# Patient Record
Sex: FEMALE | Race: WHITE | NOT HISPANIC OR LATINO | URBAN - METROPOLITAN AREA
[De-identification: names, ages, dates, MRNs, and addresses within clinical notes are randomized per-mention and may not be internally consistent; named-entity substitution may affect disease eponyms.]

---

## 2019-06-17 ENCOUNTER — EMERGENCY (EMERGENCY)
Facility: HOSPITAL | Age: 69
LOS: 1 days | Discharge: HOME | End: 2019-06-17
Admitting: EMERGENCY MEDICINE
Payer: MEDICARE

## 2019-06-17 VITALS
TEMPERATURE: 98 F | RESPIRATION RATE: 18 BRPM | DIASTOLIC BLOOD PRESSURE: 81 MMHG | OXYGEN SATURATION: 100 % | SYSTOLIC BLOOD PRESSURE: 152 MMHG | HEART RATE: 74 BPM

## 2019-06-17 LAB
APPEARANCE UR: CLEAR — SIGNIFICANT CHANGE UP
BACTERIA # UR AUTO: HIGH
BILIRUB UR-MCNC: NEGATIVE — SIGNIFICANT CHANGE UP
BLOOD UR QL VISUAL: SIGNIFICANT CHANGE UP
COLOR SPEC: YELLOW — SIGNIFICANT CHANGE UP
GLUCOSE UR-MCNC: NEGATIVE — SIGNIFICANT CHANGE UP
HYALINE CASTS # UR AUTO: HIGH
KETONES UR-MCNC: NEGATIVE — SIGNIFICANT CHANGE UP
LEUKOCYTE ESTERASE UR-ACNC: SIGNIFICANT CHANGE UP
NITRITE UR-MCNC: POSITIVE — HIGH
PH UR: 6 — SIGNIFICANT CHANGE UP (ref 5–8)
PROT UR-MCNC: 20 — SIGNIFICANT CHANGE UP
RBC CASTS # UR COMP ASSIST: HIGH (ref 0–?)
SP GR SPEC: 1.02 — SIGNIFICANT CHANGE UP (ref 1–1.04)
SQUAMOUS # UR AUTO: SIGNIFICANT CHANGE UP
UROBILINOGEN FLD QL: NORMAL — SIGNIFICANT CHANGE UP
WBC UR QL: >50 — HIGH (ref 0–?)

## 2019-06-17 PROCEDURE — 99283 EMERGENCY DEPT VISIT LOW MDM: CPT | Mod: 25

## 2019-06-17 RX ORDER — PHENAZOPYRIDINE HCL 100 MG
1 TABLET ORAL
Qty: 4 | Refills: 0
Start: 2019-06-17 | End: 2020-05-19

## 2019-06-17 RX ORDER — FLUCONAZOLE 150 MG/1
1 TABLET ORAL
Qty: 1 | Refills: 0
Start: 2019-06-17 | End: 2019-06-17

## 2019-06-17 RX ORDER — NITROFURANTOIN MACROCRYSTAL 50 MG
1 CAPSULE ORAL
Qty: 20 | Refills: 0
Start: 2019-06-17 | End: 2019-06-26

## 2019-06-17 RX ORDER — PHENAZOPYRIDINE HCL 100 MG
1 TABLET ORAL
Qty: 4 | Refills: 0
Start: 2019-06-17 | End: 2019-06-18

## 2019-06-17 RX ORDER — CIPROFLOXACIN LACTATE 400MG/40ML
500 VIAL (ML) INTRAVENOUS ONCE
Refills: 0 | Status: COMPLETED | OUTPATIENT
Start: 2019-06-17 | End: 2019-06-17

## 2019-06-17 RX ADMIN — Medication 500 MILLIGRAM(S): at 06:39

## 2019-06-17 NOTE — ED ADULT NURSE NOTE - OBJECTIVE STATEMENT
patient aaox4. came in with burning and itching while urinating and increase pressure when palpating lower abdomen. has a hx of uti and cancer. currently in in nad. sent urine tests. urine is clear and yellow. Will continue to monitor

## 2019-06-17 NOTE — ED PROVIDER NOTE - OBJECTIVE STATEMENT
70 y/o female pmh colon CA, had colostomy reversed 1 month ago presents with c/o dysuria, polyuria x 2 days, denies any  headaches, neck pain, cough,f /c/n/v/d, chest pain, sob, abdominal pain, hematuria, numbness/weakness/tingling, recent travel, sick contact

## 2019-06-17 NOTE — ED ADULT NURSE NOTE - NSIMPLEMENTINTERV_GEN_ALL_ED
Implemented All Universal Safety Interventions:  [place] to call system. Call bell, personal items and telephone within reach. Instruct patient to call for assistance. Room bathroom lighting operational. Non-slip footwear when patient is off stretcher. Physically safe environment: no spills, clutter or unnecessary equipment. Stretcher in lowest position, wheels locked, appropriate side rails in place.

## 2019-06-17 NOTE — ED PROVIDER NOTE - CARE PLAN
Principal Discharge DX:	UTI (urinary tract infection)  Assessment and plan of treatment:	pls rest, drink plenty of fluids, take your abx as instructed, f/u with your pmd, return for any worsening symptoms or any other concerning symptoms

## 2019-06-17 NOTE — ED PROVIDER NOTE - PLAN OF CARE
pls rest, drink plenty of fluids, take your abx as instructed, f/u with your pmd, return for any worsening symptoms or any other concerning symptoms

## 2019-06-18 LAB — SPECIMEN SOURCE: SIGNIFICANT CHANGE UP

## 2019-06-19 LAB
-  AMIKACIN: SIGNIFICANT CHANGE UP
-  AMPICILLIN/SULBACTAM: SIGNIFICANT CHANGE UP
-  AMPICILLIN: SIGNIFICANT CHANGE UP
-  AZTREONAM: SIGNIFICANT CHANGE UP
-  CEFAZOLIN: SIGNIFICANT CHANGE UP
-  CEFEPIME: SIGNIFICANT CHANGE UP
-  CEFOXITIN: SIGNIFICANT CHANGE UP
-  CEFTAZIDIME: SIGNIFICANT CHANGE UP
-  CEFTRIAXONE: SIGNIFICANT CHANGE UP
-  CIPROFLOXACIN: SIGNIFICANT CHANGE UP
-  ERTAPENEM: SIGNIFICANT CHANGE UP
-  GENTAMICIN: SIGNIFICANT CHANGE UP
-  IMIPENEM: SIGNIFICANT CHANGE UP
-  LEVOFLOXACIN: SIGNIFICANT CHANGE UP
-  MEROPENEM: SIGNIFICANT CHANGE UP
-  NITROFURANTOIN: SIGNIFICANT CHANGE UP
-  PIPERACILLIN/TAZOBACTAM: SIGNIFICANT CHANGE UP
-  TIGECYCLINE: SIGNIFICANT CHANGE UP
-  TOBRAMYCIN: SIGNIFICANT CHANGE UP
-  TRIMETHOPRIM/SULFAMETHOXAZOLE: SIGNIFICANT CHANGE UP
BACTERIA UR CULT: SIGNIFICANT CHANGE UP
METHOD TYPE: SIGNIFICANT CHANGE UP
ORGANISM # SPEC MICROSCOPIC CNT: SIGNIFICANT CHANGE UP
ORGANISM # SPEC MICROSCOPIC CNT: SIGNIFICANT CHANGE UP

## 2019-06-19 NOTE — ED POST DISCHARGE NOTE - REASON FOR FOLLOW-UP
Other Mine from [facility] called asking that we fax over UCX results to  fax confirmation received.

## 2019-08-30 ENCOUNTER — EMERGENCY (EMERGENCY)
Facility: HOSPITAL | Age: 69
LOS: 1 days | Discharge: HOME | End: 2019-08-30
Attending: EMERGENCY MEDICINE | Admitting: EMERGENCY MEDICINE
Payer: MEDICARE

## 2019-08-30 VITALS
OXYGEN SATURATION: 100 % | RESPIRATION RATE: 18 BRPM | DIASTOLIC BLOOD PRESSURE: 62 MMHG | HEART RATE: 79 BPM | TEMPERATURE: 98 F | SYSTOLIC BLOOD PRESSURE: 129 MMHG

## 2019-08-30 LAB
ALBUMIN SERPL ELPH-MCNC: 4.1 G/DL — SIGNIFICANT CHANGE UP (ref 3.3–5)
ALP SERPL-CCNC: 114 U/L — SIGNIFICANT CHANGE UP (ref 40–120)
ALT FLD-CCNC: 27 U/L — SIGNIFICANT CHANGE UP (ref 4–33)
ANION GAP SERPL CALC-SCNC: 11 MMO/L — SIGNIFICANT CHANGE UP (ref 7–14)
AST SERPL-CCNC: 24 U/L — SIGNIFICANT CHANGE UP (ref 4–32)
BASOPHILS # BLD AUTO: 0.03 K/UL — SIGNIFICANT CHANGE UP (ref 0–0.2)
BASOPHILS NFR BLD AUTO: 0.3 % — SIGNIFICANT CHANGE UP (ref 0–2)
BILIRUB SERPL-MCNC: 0.4 MG/DL — SIGNIFICANT CHANGE UP (ref 0.2–1.2)
BUN SERPL-MCNC: 13 MG/DL — SIGNIFICANT CHANGE UP (ref 7–23)
CALCIUM SERPL-MCNC: 9.4 MG/DL — SIGNIFICANT CHANGE UP (ref 8.4–10.5)
CHLORIDE SERPL-SCNC: 105 MMOL/L — SIGNIFICANT CHANGE UP (ref 98–107)
CO2 SERPL-SCNC: 25 MMOL/L — SIGNIFICANT CHANGE UP (ref 22–31)
CREAT SERPL-MCNC: 0.65 MG/DL — SIGNIFICANT CHANGE UP (ref 0.5–1.3)
EOSINOPHIL # BLD AUTO: 0.01 K/UL — SIGNIFICANT CHANGE UP (ref 0–0.5)
EOSINOPHIL NFR BLD AUTO: 0.1 % — SIGNIFICANT CHANGE UP (ref 0–6)
GLUCOSE SERPL-MCNC: 109 MG/DL — HIGH (ref 70–99)
HCT VFR BLD CALC: 38.8 % — SIGNIFICANT CHANGE UP (ref 34.5–45)
HGB BLD-MCNC: 12.2 G/DL — SIGNIFICANT CHANGE UP (ref 11.5–15.5)
IMM GRANULOCYTES NFR BLD AUTO: 0.6 % — SIGNIFICANT CHANGE UP (ref 0–1.5)
LIDOCAIN IGE QN: 49.2 U/L — SIGNIFICANT CHANGE UP (ref 7–60)
LYMPHOCYTES # BLD AUTO: 0.8 K/UL — LOW (ref 1–3.3)
LYMPHOCYTES # BLD AUTO: 6.7 % — LOW (ref 13–44)
MCHC RBC-ENTMCNC: 28.3 PG — SIGNIFICANT CHANGE UP (ref 27–34)
MCHC RBC-ENTMCNC: 31.4 % — LOW (ref 32–36)
MCV RBC AUTO: 90 FL — SIGNIFICANT CHANGE UP (ref 80–100)
MONOCYTES # BLD AUTO: 0.47 K/UL — SIGNIFICANT CHANGE UP (ref 0–0.9)
MONOCYTES NFR BLD AUTO: 3.9 % — SIGNIFICANT CHANGE UP (ref 2–14)
NEUTROPHILS # BLD AUTO: 10.57 K/UL — HIGH (ref 1.8–7.4)
NEUTROPHILS NFR BLD AUTO: 88.4 % — HIGH (ref 43–77)
NRBC # FLD: 0 K/UL — SIGNIFICANT CHANGE UP (ref 0–0)
PLATELET # BLD AUTO: 221 K/UL — SIGNIFICANT CHANGE UP (ref 150–400)
PMV BLD: 9.4 FL — SIGNIFICANT CHANGE UP (ref 7–13)
POTASSIUM SERPL-MCNC: 3.8 MMOL/L — SIGNIFICANT CHANGE UP (ref 3.5–5.3)
POTASSIUM SERPL-SCNC: 3.8 MMOL/L — SIGNIFICANT CHANGE UP (ref 3.5–5.3)
PROT SERPL-MCNC: 7.6 G/DL — SIGNIFICANT CHANGE UP (ref 6–8.3)
RBC # BLD: 4.31 M/UL — SIGNIFICANT CHANGE UP (ref 3.8–5.2)
RBC # FLD: 13.8 % — SIGNIFICANT CHANGE UP (ref 10.3–14.5)
SODIUM SERPL-SCNC: 141 MMOL/L — SIGNIFICANT CHANGE UP (ref 135–145)
TROPONIN T, HIGH SENSITIVITY: < 6 NG/L — SIGNIFICANT CHANGE UP (ref ?–14)
TROPONIN T, HIGH SENSITIVITY: < 6 NG/L — SIGNIFICANT CHANGE UP (ref ?–14)
WBC # BLD: 11.95 K/UL — HIGH (ref 3.8–10.5)
WBC # FLD AUTO: 11.95 K/UL — HIGH (ref 3.8–10.5)

## 2019-08-30 PROCEDURE — 93010 ELECTROCARDIOGRAM REPORT: CPT

## 2019-08-30 PROCEDURE — 99284 EMERGENCY DEPT VISIT MOD MDM: CPT | Mod: 25

## 2019-08-30 RX ORDER — MECLIZINE HCL 12.5 MG
1 TABLET ORAL
Qty: 15 | Refills: 0
Start: 2019-08-30

## 2019-08-30 RX ORDER — MECLIZINE HCL 12.5 MG
25 TABLET ORAL ONCE
Refills: 0 | Status: COMPLETED | OUTPATIENT
Start: 2019-08-30 | End: 2019-08-30

## 2019-08-30 RX ORDER — SODIUM CHLORIDE 9 MG/ML
500 INJECTION INTRAMUSCULAR; INTRAVENOUS; SUBCUTANEOUS ONCE
Refills: 0 | Status: COMPLETED | OUTPATIENT
Start: 2019-08-30 | End: 2019-08-30

## 2019-08-30 RX ORDER — FAMOTIDINE 10 MG/ML
20 INJECTION INTRAVENOUS DAILY
Refills: 0 | Status: DISCONTINUED | OUTPATIENT
Start: 2019-08-30 | End: 2019-09-03

## 2019-08-30 RX ORDER — HEPARIN SODIUM 5000 [USP'U]/ML
30 INJECTION INTRAVENOUS; SUBCUTANEOUS ONCE
Refills: 0 | Status: COMPLETED | OUTPATIENT
Start: 2019-08-30 | End: 2019-08-30

## 2019-08-30 RX ADMIN — FAMOTIDINE 20 MILLIGRAM(S): 10 INJECTION INTRAVENOUS at 14:48

## 2019-08-30 RX ADMIN — SODIUM CHLORIDE 500 MILLILITER(S): 9 INJECTION INTRAMUSCULAR; INTRAVENOUS; SUBCUTANEOUS at 14:47

## 2019-08-30 RX ADMIN — Medication 25 MILLIGRAM(S): at 14:48

## 2019-08-30 RX ADMIN — HEPARIN SODIUM 30 UNIT(S): 5000 INJECTION INTRAVENOUS; SUBCUTANEOUS at 17:07

## 2019-08-30 NOTE — ED ADULT NURSE NOTE - OBJECTIVE STATEMENT
69 y female A+OX3 presents to the ER with the complaint of dizziness. Pt states this morning after coming out of the shower, she started experiencing dizziness with nausea and diaphoresis. Pt states she also vomited twice today prior to arrival.  Pt states dizziness gets worse when she moves or turns her head but when she is staying still. symptoms disappear. Pt hx of colon ca but currently not on any chemo or radiation. Denies any other PMH or daily meds. Pt has a RCW port. Pt being evaluated by MD team. Will continue to monitor.

## 2019-08-30 NOTE — ED ADULT NURSE REASSESSMENT NOTE - NS ED NURSE REASSESS COMMENT FT1
at 1440, pts right chest wall mediport was accessed with a 22g 1/2 inch allison needle. port flushes well, with positive blood return. pt currently provided with crackers to be PO challenged.  pt reports improvement in symptoms

## 2019-08-30 NOTE — ED PROVIDER NOTE - CHPI ED SYMPTOMS NEG
no fever/no blurred vision/no change in level of consciousness/Denies blurred vision, SOB, abd pain, vomiting, weakness, numbness, f/c, or change in LOC./no numbness/no weakness/no vomiting

## 2019-08-30 NOTE — ED PROVIDER NOTE - ATTENDING CONTRIBUTION TO CARE
Pt was seen and evaluated by me. Pt noted having dizziness today, worse with movement, noted after getting of the shower. Pt also notes having a blintz she is not sure agreed with her. Pt notes after an episode of vomiting feeling better. Denies any headache, fever, chills, nausea, vomiting, SOB, chest pain, or abd pain. Lungs CTA b/l. RRR. Abd soft, non-tender. No nystagmus. No focal deficits. TMI b/l. Signed out to receiving physician Dr. Edward who will further evaluate. Pt was seen and evaluated by me. Pt noted having dizziness today, worse with movement, noted after getting of the shower. Pt also notes having a blintz she is not sure agreed with her. Pt notes after an episode of vomiting feeling better. Denies any headache, fever, chills, nausea, vomiting, SOB, chest pain, or abd pain. Lungs CTA b/l. RRR. Abd soft, non-tender. No nystagmus. No focal deficits. TMI b/l.

## 2019-08-30 NOTE — ED PROVIDER NOTE - PATIENT PORTAL LINK FT
You can access the FollowMyHealth Patient Portal offered by [facility] by registering at the following website: [email or web address]. By joining [employer]’s FollowMyHealth portal, you will also be able to view your health information using other applications (apps) compatible with our system.

## 2019-08-30 NOTE — ED PROVIDER NOTE - OBJECTIVE STATEMENT
70 y/o F with a PMHx of colorectal cancer presents to the ED c/o dizziness at 11:30AM today. Pt states she was taking a shower when she suddenly started feeling dizzy and nausea. Pt denotes similar symptoms a few years ago secondary to food ingestion and vomiting. Pt describe dizziness when she leaning back, but improves when sitting still. She is concern for vertigo. Pt states dizziness persist when she moves her head and closes her eyes. Of note, pt has an external port secondary from her cancer related tx. Denies blurred vision, SOB, abd pain, vomiting, weakness, numbness, f/c, or change in LOC. No other acute complaints at time of eval. 70 y/o F with a PMHx of colorectal cancer presents to the ED c/o dizziness with associated symptoms of nausea and diaphoresis after eating a pastry at 11:30AM today. Pt states she was taking a shower when she suddenly started feeling dizzy and nausea. Pt denotes similar symptoms a few years ago secondary to food ingestion and vomiting. Pt describe dizziness when she leaning back, but improves when sitting still. She is concern for vertigo. Pt states dizziness persist when she moves her head and closes her eyes. Of note, pt has an external port secondary from her cancer related tx. Denies blurred vision, SOB, abd pain, vomiting, weakness, numbness, f/c, or change in LOC. No other acute complaints at time of eval.

## 2019-08-30 NOTE — ED PROVIDER NOTE - PROGRESS NOTE DETAILS
Sudhir-PGY1: pt seen and evaluated by myself.  69 year old female with PMH colon cancer s/p partial collectomy and not currently undergoing chemotherapy presents with dizziness since this morning. Pt states she was getting out of the shower at approximately 1130 this morning and c/o sudden onset dizziness described as "room spinning."  Pt also reports sweating, nausea, and an episode of vomiting.  Pt states she had a similar episode "a few years ago" that resolved without intervention.  Pt states the dizziness is worse with head movement and improved when laying still. Pt reports recent postnasal drip, but denies nasal congestion, ear pain, hearing loss, or cough. Denies headache, chest pain, shortness of breath, abdominal pain, fever, rash, weakness, numbness, or paresthesias.  Denies any recent injury or trauma. On exam, patient has CN II-XII intact with 5/5 strength in upper and lower extremities, no focal neurologic deficits.  No nystagmus.  Positive Romberg test, patient is ambulatory without assistance.  Likely BPPV as dizziness is worse with head movement and patient has recent postnasal drip. Plan includes EKG, labs, symptomatic treatment and reassessment with disposition accordingly. Sudhir-PGY1: pt seen and re-evaluated at bedside.  Pt states her symptoms have improved.  Pt comfortable in NAD.  Will send repeat troponin since symptoms started within 3 hours of initial negative troponin.  Will PO challenge pt and reassess with likely DC. Sudhir-PGY1: pt tolerating PO intake without any nausea, vomiting, or pain.  Pt reports improved symptoms, requesting DC.

## 2019-08-30 NOTE — ED ADULT TRIAGE NOTE - CHIEF COMPLAINT QUOTE
colon CA one year ago no longer on chemo. reports onset of 1130 am of dizziness and nausea which is worse with position. reports symptoms improve when she sits still and does not move her head. Jammie dozier

## 2019-08-30 NOTE — ED PROVIDER NOTE - CLINICAL SUMMARY MEDICAL DECISION MAKING FREE TEXT BOX
70 y/o F with a PMHx of colorectal cancer presents to the ED c/o dizziness at 11:30AM today. Plan - Will obtain blood work, give fluids, 70 y/o F with a PMHx of colorectal cancer presents to the ED c/o dizziness with associated symptoms of nausea and diaphoresis after eating a pastry at 11:30AM today. Concern for ACS vs vertigo vs sinusitis. Plan - Will obtain blood work and EKG, give fluids, meclizine.

## 2019-08-30 NOTE — ED PROVIDER NOTE - NSFOLLOWUPINSTRUCTIONS_ED_ALL_ED_FT
Dizziness    Dizziness can manifest as a feeling of unsteadiness or light-headedness. You may feel like you are about to faint. This condition can be caused by a number of things, including medicines, dehydration, or illness. Drink enough fluid to keep your urine clear or pale yellow. Do not drink alcohol and limit your caffeine intake. Avoid quick or sudden movements.  Rise slowly from chairs and steady yourself until you feel okay. In the morning, first sit up on the side of the bed.    Rest and drink plenty of fluids.    Take meclizine 25 mg 1 tablet up to 3 times a day as needed for dizziness.     SEEK IMMEDIATE MEDICAL CARE IF YOU HAVE ANY OF THE FOLLOWING SYMPTOMS: vomiting, changes in your vision or speech, weakness in your arms or legs, trouble speaking or swallowing, chest pain, abdominal pain, shortness of breath, sweating, bleeding, headache, neck pain, or fever.

## 2020-05-18 ENCOUNTER — EMERGENCY (EMERGENCY)
Facility: HOSPITAL | Age: 70
LOS: 1 days | Discharge: HOME | End: 2020-05-18
Attending: EMERGENCY MEDICINE | Admitting: EMERGENCY MEDICINE
Payer: MEDICARE

## 2020-05-18 VITALS
SYSTOLIC BLOOD PRESSURE: 155 MMHG | RESPIRATION RATE: 18 BRPM | DIASTOLIC BLOOD PRESSURE: 84 MMHG | TEMPERATURE: 98 F | OXYGEN SATURATION: 100 % | HEART RATE: 91 BPM

## 2020-05-18 PROBLEM — C19 MALIGNANT NEOPLASM OF RECTOSIGMOID JUNCTION: Chronic | Status: ACTIVE | Noted: 2019-08-30

## 2020-05-18 LAB
ANION GAP SERPL CALC-SCNC: 12 MMO/L — SIGNIFICANT CHANGE UP (ref 7–14)
APPEARANCE UR: CLEAR — SIGNIFICANT CHANGE UP
BASOPHILS # BLD AUTO: 0.02 K/UL — SIGNIFICANT CHANGE UP (ref 0–0.2)
BASOPHILS NFR BLD AUTO: 0.2 % — SIGNIFICANT CHANGE UP (ref 0–2)
BILIRUB UR-MCNC: NEGATIVE — SIGNIFICANT CHANGE UP
BLOOD UR QL VISUAL: NEGATIVE — SIGNIFICANT CHANGE UP
BUN SERPL-MCNC: 12 MG/DL — SIGNIFICANT CHANGE UP (ref 7–23)
CALCIUM SERPL-MCNC: 9.9 MG/DL — SIGNIFICANT CHANGE UP (ref 8.4–10.5)
CHLORIDE SERPL-SCNC: 102 MMOL/L — SIGNIFICANT CHANGE UP (ref 98–107)
CO2 SERPL-SCNC: 25 MMOL/L — SIGNIFICANT CHANGE UP (ref 22–31)
COLOR SPEC: COLORLESS — SIGNIFICANT CHANGE UP
CREAT SERPL-MCNC: 0.77 MG/DL — SIGNIFICANT CHANGE UP (ref 0.5–1.3)
EOSINOPHIL # BLD AUTO: 0 K/UL — SIGNIFICANT CHANGE UP (ref 0–0.5)
EOSINOPHIL NFR BLD AUTO: 0 % — SIGNIFICANT CHANGE UP (ref 0–6)
GLUCOSE SERPL-MCNC: 118 MG/DL — HIGH (ref 70–99)
GLUCOSE UR-MCNC: NEGATIVE — SIGNIFICANT CHANGE UP
HCT VFR BLD CALC: 41.5 % — SIGNIFICANT CHANGE UP (ref 34.5–45)
HGB BLD-MCNC: 13 G/DL — SIGNIFICANT CHANGE UP (ref 11.5–15.5)
IMM GRANULOCYTES NFR BLD AUTO: 0.5 % — SIGNIFICANT CHANGE UP (ref 0–1.5)
KETONES UR-MCNC: NEGATIVE — SIGNIFICANT CHANGE UP
LEUKOCYTE ESTERASE UR-ACNC: NEGATIVE — SIGNIFICANT CHANGE UP
LYMPHOCYTES # BLD AUTO: 0.92 K/UL — LOW (ref 1–3.3)
LYMPHOCYTES # BLD AUTO: 11.2 % — LOW (ref 13–44)
MCHC RBC-ENTMCNC: 28 PG — SIGNIFICANT CHANGE UP (ref 27–34)
MCHC RBC-ENTMCNC: 31.3 % — LOW (ref 32–36)
MCV RBC AUTO: 89.2 FL — SIGNIFICANT CHANGE UP (ref 80–100)
MONOCYTES # BLD AUTO: 0.4 K/UL — SIGNIFICANT CHANGE UP (ref 0–0.9)
MONOCYTES NFR BLD AUTO: 4.9 % — SIGNIFICANT CHANGE UP (ref 2–14)
NEUTROPHILS # BLD AUTO: 6.86 K/UL — SIGNIFICANT CHANGE UP (ref 1.8–7.4)
NEUTROPHILS NFR BLD AUTO: 83.2 % — HIGH (ref 43–77)
NITRITE UR-MCNC: NEGATIVE — SIGNIFICANT CHANGE UP
NRBC # FLD: 0 K/UL — SIGNIFICANT CHANGE UP (ref 0–0)
PH UR: 6.5 — SIGNIFICANT CHANGE UP (ref 5–8)
PLATELET # BLD AUTO: 246 K/UL — SIGNIFICANT CHANGE UP (ref 150–400)
PMV BLD: 9.5 FL — SIGNIFICANT CHANGE UP (ref 7–13)
POTASSIUM SERPL-MCNC: 3.9 MMOL/L — SIGNIFICANT CHANGE UP (ref 3.5–5.3)
POTASSIUM SERPL-SCNC: 3.9 MMOL/L — SIGNIFICANT CHANGE UP (ref 3.5–5.3)
PROT UR-MCNC: NEGATIVE — SIGNIFICANT CHANGE UP
RBC # BLD: 4.65 M/UL — SIGNIFICANT CHANGE UP (ref 3.8–5.2)
RBC # FLD: 13.6 % — SIGNIFICANT CHANGE UP (ref 10.3–14.5)
SODIUM SERPL-SCNC: 139 MMOL/L — SIGNIFICANT CHANGE UP (ref 135–145)
SP GR SPEC: 1.01 — SIGNIFICANT CHANGE UP (ref 1–1.04)
UROBILINOGEN FLD QL: NORMAL — SIGNIFICANT CHANGE UP
WBC # BLD: 8.24 K/UL — SIGNIFICANT CHANGE UP (ref 3.8–10.5)
WBC # FLD AUTO: 8.24 K/UL — SIGNIFICANT CHANGE UP (ref 3.8–10.5)

## 2020-05-18 PROCEDURE — 99283 EMERGENCY DEPT VISIT LOW MDM: CPT

## 2020-05-18 RX ORDER — PHENAZOPYRIDINE HCL 100 MG
200 TABLET ORAL ONCE
Refills: 0 | Status: COMPLETED | OUTPATIENT
Start: 2020-05-18 | End: 2020-05-18

## 2020-05-18 RX ORDER — SODIUM CHLORIDE 9 MG/ML
1000 INJECTION INTRAMUSCULAR; INTRAVENOUS; SUBCUTANEOUS ONCE
Refills: 0 | Status: COMPLETED | OUTPATIENT
Start: 2020-05-18 | End: 2020-05-18

## 2020-05-18 RX ADMIN — Medication 200 MILLIGRAM(S): at 15:45

## 2020-05-18 RX ADMIN — SODIUM CHLORIDE 1000 MILLILITER(S): 9 INJECTION INTRAMUSCULAR; INTRAVENOUS; SUBCUTANEOUS at 14:42

## 2020-05-18 NOTE — ED PROVIDER NOTE - PROGRESS NOTE DETAILS
Alex SALES MD PGY2: Patient continues to ambulate normally and does not have redemonstration of initial dizziness that brought her here. Return precautions given, patient to follow-up with neurology and urology.

## 2020-05-18 NOTE — ED PROVIDER NOTE - CLINICAL SUMMARY MEDICAL DECISION MAKING FREE TEXT BOX
Alex SALES MD PGY2: 70 F here with improved dizziness after getting meclizine with urinary frequency s/p macrobid and candida treatment c/f peripheral vertigo, UTI. Unlikely central as patients neurologic exam at this time normal, barring left nystagmus that fatigues. urinalysis and UCx for UTI.

## 2020-05-18 NOTE — ED PROVIDER NOTE - PATIENT PORTAL LINK FT
You can access the FollowMyHealth Patient Portal offered by [facility] by registering at the following website: [email or web address]. By joining [employer]’s FollowMyHealth portal, you will also be able to view your health information using other applications (apps) compatible with our system.

## 2020-05-18 NOTE — ED PROVIDER NOTE - NSFOLLOWUPCLINICS_GEN_ALL_ED_FT
[facility]  Neurology  [address] [place] [id number]  Phone: [phone]  Fax:   Follow Up Time: 4-6 Days    [facility]  Urology  [address] [place] [address]  Phone: [phone]  Fax:   Follow Up Time: 4-6 Days

## 2020-05-18 NOTE — ED ADULT NURSE REASSESSMENT NOTE - NS ED NURSE REASSESS COMMENT FT1
Pt a&ox3, calm and cooperative. pt c/o of pressure when urinating, denies n/v, chest pain, sob, abdominal discomfort. respirations even/unlabored, nad noted will continue to monitor Pt a&ox3, calm and cooperative. pt c/o of pressure when urinating, denies n/v, dizziness, chest pain, sob, abdominal discomfort at the moment. respirations even/unlabored, nad noted will continue to monitor

## 2020-05-18 NOTE — ED PROVIDER NOTE - PHYSICAL EXAMINATION
Alex SALES MD PGY2:   PHYSICAL EXAM:    GENERAL: NAD, well-developed  HEENT:  Atraumatic, Normocephalic  CHEST/LUNG: Chest rise equal bilaterally  HEART: Regular rate and rhythm  ABDOMEN: Soft, Nontender, Nondistended  EXTREMITIES:  2+ Peripheral Pulses.  PSYCH: A&Ox3  SKIN: No obvious rashes or lesions  NEUROLOGY: Able to ambulate without any difficulty. Finger to nose test intact. Left sided fatiguable nystagmus. No vertical nystagmus. No nuchal rigidity.

## 2020-05-18 NOTE — ED PROVIDER NOTE - ATTENDING CONTRIBUTION TO CARE
agree with resident note    "70 F PMH hx colorectal ca s/p resection and colostomy reversal 1 year ago and hx of dizziness and vertigo here for dizziness and vertigo today after waking up (did not wake her up from sleep) that is worse with head movement and ambulation assoc with nausea. Awoke this morning with those symptoms and took some meclizine 25mg from her prior episode last year after the nausea improved. Currently symptoms much better. No difficulty ambulating, able to sit without symptoms and ambulate without symptoms.   "  Also states has had recent urinary issues.    PE: well appearing; at rest no dizziness; PERRL; nystagmus on leftward gaze; CTAB/L; s1 s2 mo m/r/g abd soft/NT/ND Neuro: CNs intact 5/5 motor UE and LE; sensation intact; gait stable; no visual field cut; ISABELLE, FTN wnl    vertigo no central findings; IVF, check UA, labs, meclizine reassess

## 2020-05-18 NOTE — ED PROVIDER NOTE - NSFOLLOWUPINSTRUCTIONS_ED_ALL_ED_FT
Please return to the ED for any concerns, especially if you have worsening dizziness, fevers, chills, back pain, inability to tolerate oral intake, weakness, numbness, or tingling.     Please follow-up with a neurologist and your urologist in the next week. Your urinalysis here didn't show any evidence of a UTI.

## 2020-05-18 NOTE — ED ADULT TRIAGE NOTE - CHIEF COMPLAINT QUOTE
PT C/O dizziness with room spinning sensation that woke her up from sleep and nausea. PT also C/O urinary frequency. States has PHX Vertigo, symptoms were somewhat relieved by taking meclizine today. Denies fevers, vomiting, hematuria, ABD pain.

## 2020-05-19 LAB
CULTURE RESULTS: SIGNIFICANT CHANGE UP
SPECIMEN SOURCE: SIGNIFICANT CHANGE UP

## 2020-08-12 NOTE — ED ADULT NURSE NOTE - NSHOSCREENINGQ1_ED_ALL_ED
No

## 2020-10-25 ENCOUNTER — EMERGENCY (EMERGENCY)
Facility: HOSPITAL | Age: 70
LOS: 1 days | Discharge: HOME | End: 2020-10-25
Attending: EMERGENCY MEDICINE | Admitting: EMERGENCY MEDICINE
Payer: MEDICARE

## 2020-10-25 VITALS
RESPIRATION RATE: 16 BRPM | OXYGEN SATURATION: 99 % | DIASTOLIC BLOOD PRESSURE: 99 MMHG | SYSTOLIC BLOOD PRESSURE: 120 MMHG | HEART RATE: 96 BPM | TEMPERATURE: 98 F

## 2020-10-25 LAB
APPEARANCE UR: SIGNIFICANT CHANGE UP
BACTERIA # UR AUTO: SIGNIFICANT CHANGE UP
BILIRUB UR-MCNC: HIGH
BLOOD UR QL VISUAL: NEGATIVE — SIGNIFICANT CHANGE UP
COLOR SPEC: SIGNIFICANT CHANGE UP
GLUCOSE UR-MCNC: NEGATIVE — SIGNIFICANT CHANGE UP
HYALINE CASTS # UR AUTO: NEGATIVE — SIGNIFICANT CHANGE UP
KETONES UR-MCNC: NEGATIVE — SIGNIFICANT CHANGE UP
LEUKOCYTE ESTERASE UR-ACNC: SIGNIFICANT CHANGE UP
NITRITE UR-MCNC: POSITIVE — HIGH
PH UR: 6 — SIGNIFICANT CHANGE UP (ref 5–8)
PROT UR-MCNC: 10 — SIGNIFICANT CHANGE UP
RBC CASTS # UR COMP ASSIST: SIGNIFICANT CHANGE UP (ref 0–?)
SP GR SPEC: 1.02 — SIGNIFICANT CHANGE UP (ref 1–1.04)
SQUAMOUS # UR AUTO: SIGNIFICANT CHANGE UP
UROBILINOGEN FLD QL: HIGH
WBC UR QL: >50 — HIGH (ref 0–?)

## 2020-10-25 PROCEDURE — 99284 EMERGENCY DEPT VISIT MOD MDM: CPT | Mod: GC

## 2020-10-25 RX ORDER — FLUCONAZOLE 150 MG/1
1 TABLET ORAL
Qty: 1 | Refills: 0
Start: 2020-10-25 | End: 2020-10-25

## 2020-10-25 RX ORDER — CEFPODOXIME PROXETIL 100 MG
200 TABLET ORAL ONCE
Refills: 0 | Status: DISCONTINUED | OUTPATIENT
Start: 2020-10-25 | End: 2020-10-25

## 2020-10-25 RX ORDER — FOSFOMYCIN TROMETHAMINE 3 G/1
1 POWDER ORAL
Qty: 1 | Refills: 0
Start: 2020-10-25 | End: 2020-10-25

## 2020-10-25 RX ORDER — CEFTRIAXONE 500 MG/1
1000 INJECTION, POWDER, FOR SOLUTION INTRAMUSCULAR; INTRAVENOUS ONCE
Refills: 0 | Status: COMPLETED | OUTPATIENT
Start: 2020-10-25 | End: 2020-10-25

## 2020-10-25 RX ADMIN — CEFTRIAXONE 100 MILLIGRAM(S): 500 INJECTION, POWDER, FOR SOLUTION INTRAMUSCULAR; INTRAVENOUS at 13:45

## 2020-10-25 NOTE — ED PROVIDER NOTE - CLINICAL SUMMARY MEDICAL DECISION MAKING FREE TEXT BOX
Sebastian: H/o UTI, p/w UTI Sx (dysuria). No CVAT/F/V. Recently was on Macrobid. Check UA and Cx. Change to cefpedoxime. Has Uro f/u. Sebastian: H/o UTI, p/w UTI Sx (dysuria). No CVAT/F/V. Recently was on Macrobid. Check UA and Cx. Change to cefpedoxime. Has Uro f/u.    Update: Will treat with ceftriaxone (1 dose) and send Fosfomycin (1 dose) to her pharmacy and dc.

## 2020-10-25 NOTE — ED PROVIDER NOTE - PATIENT PORTAL LINK FT
You can access the FollowMyHealth Patient Portal offered by [facility] by registering at the following website: [email or web address]. By joining [employer]’s FollowMyHealth portal, you will also be able to view your health information using other applications (apps) compatible with our system.

## 2020-10-25 NOTE — ED ADULT NURSE NOTE - OBJECTIVE STATEMENT
Receive pt. in Intake room 6 alert and oriented x 4, presenting to the ER with complaints of urinary frequency and dysuria. Pt. stated " I have been taking macrobid for 2 weeks but it is not helping". Urine culture and UA sent,  no c/o pain or burning on urination ,will continue to monitor.

## 2020-10-25 NOTE — ED ADULT TRIAGE NOTE - CHIEF COMPLAINT QUOTE
Pt. c/o urinary frequency and dysuria x 2 wks. States she was placed on Macrobid over past 2 wks but was given a lower dose than she usually gets for frequent UTIs. Denies abdominal pain, back pain or fevers.

## 2020-10-25 NOTE — ED PROVIDER NOTE - OBJECTIVE STATEMENT
70F presents with dysuria and frequency x 3 days. No hematuria. No incontinence. Patient has hx of multiple UTIs per year. Recently switched urologist who has her a month long maintenance dose of Macrobid (100mg daily) for UTI prophylaxis, then switched her to Macrobid 50mg daily for 2 weeks for an acute UTI. Patient finished all prescribed antibiotics previously.     No fever/chills. No n/v/d. No cp no sob no back pain.

## 2020-10-25 NOTE — ED PROVIDER NOTE - ATTENDING CONTRIBUTION TO CARE
I performed a face-to-face evaluation of the patient and performed a history and physical examination. I agree with the history and physical examination.    H/o UTI, p/w UTI Sx (dysuria). No CVAT/F/V. Recently was on Macrobid. Check UA and Cx. Change to cefpedoxime. Has Uro f/u.

## 2020-10-25 NOTE — ED PROVIDER NOTE - NSFOLLOWUPINSTRUCTIONS_ED_ALL_ED_FT
You were seen for a UTI.      ONE dose of fosfomycin at your pharmacy and take it.     Follow up with a urologist this week.     Seek immediate medical assistance for any new or worsening symptoms such as back pain, nausea,  vomiting, inability to eat, fever.     Please take 600 mg of Ibuprofen (aka Motrin, Advil) and/or 650 mg Acetaminophen (aka Tylenol) every 6 hours, as needed, for mild-moderate pain.

## 2020-10-26 LAB
CULTURE RESULTS: SIGNIFICANT CHANGE UP
SPECIMEN SOURCE: SIGNIFICANT CHANGE UP

## 2020-10-30 ENCOUNTER — APPOINTMENT (OUTPATIENT)
Dept: UROLOGY | Facility: CLINIC | Age: 70
End: 2020-10-30
Payer: MEDICARE

## 2020-10-30 VITALS — TEMPERATURE: 96.8 F

## 2020-10-30 DIAGNOSIS — Z87.442 PERSONAL HISTORY OF URINARY CALCULI: ICD-10-CM

## 2020-10-30 DIAGNOSIS — Z78.9 OTHER SPECIFIED HEALTH STATUS: ICD-10-CM

## 2020-10-30 DIAGNOSIS — Z63.4 DISAPPEARANCE AND DEATH OF FAMILY MEMBER: ICD-10-CM

## 2020-10-30 DIAGNOSIS — Z85.038 PERSONAL HISTORY OF OTHER MALIGNANT NEOPLASM OF LARGE INTESTINE: ICD-10-CM

## 2020-10-30 DIAGNOSIS — N39.0 URINARY TRACT INFECTION, SITE NOT SPECIFIED: ICD-10-CM

## 2020-10-30 PROCEDURE — 99203 OFFICE O/P NEW LOW 30 MIN: CPT

## 2020-10-30 RX ORDER — METHENAMINE HIPPURATE 1 G/1
1 TABLET ORAL DAILY
Qty: 90 | Refills: 1 | Status: ACTIVE | COMMUNITY
Start: 2020-10-30 | End: 1900-01-01

## 2020-10-30 SDOH — SOCIAL STABILITY - SOCIAL INSECURITY: DISSAPEARANCE AND DEATH OF FAMILY MEMBER: Z63.4

## 2020-10-30 NOTE — REVIEW OF SYSTEMS
[Diarrhea] : diarrhea [Seen by urologist before (Name)  ___] : Previously seen by a urologist: [unfilled] [Urine Infection (bladder/kidney)] : bladder/kidney infection [Pain during urination] : pain during urination [History of kidney stones] : history of kidney stones [Wake up at night to urinate  How many times?  ___] : wakes up to urinate [unfilled] times during the night [Negative] : Heme/Lymph

## 2020-11-02 LAB — BACTERIA UR CULT: NORMAL

## 2020-11-02 RX ORDER — BIOTIN 10 MG
TABLET ORAL
Refills: 0 | Status: ACTIVE | COMMUNITY

## 2020-11-02 RX ORDER — CYANOCOBALAMIN (VITAMIN B-12) 1000 MCG
TABLET, EXTENDED RELEASE ORAL
Refills: 0 | Status: ACTIVE | COMMUNITY

## 2020-11-02 NOTE — ASSESSMENT
[FreeTextEntry1] : will reculture and include a fungal culture given the sterile pyuria.\par recommend if negative Hiprex fro  prevention.

## 2020-11-02 NOTE — PHYSICAL EXAM
[General Appearance - Well Developed] : well developed [General Appearance - Well Nourished] : well nourished [Edema] : no peripheral edema [Exaggerated Use Of Accessory Muscles For Inspiration] : no accessory muscle use [] : no rash [No Focal Deficits] : no focal deficits [Oriented To Time, Place, And Person] : oriented to person, place, and time

## 2020-11-02 NOTE — HISTORY OF PRESENT ILLNESS
[FreeTextEntry1] : patient presents for management of recurrent UTI\par First noted symptoms of UTI in May with increased frequency, bladder pressure and dysuria. Unclear if had culture but rated with Macrobid. Symptoms returned in September with a positive UA but culture ended up negative. treated with Ceftin but had severe diarrhea; seems to be a problem since having colon surgery. No fevers or chills and no gross hematuria. \par Has CT noting a small right kidney stone. h/o stones S/p ESWL in the past\par normal voiding habits though seeing GI concerning her bowel issues,

## 2020-11-10 ENCOUNTER — NON-APPOINTMENT (OUTPATIENT)
Age: 70
End: 2020-11-10

## 2020-11-10 LAB
APPEARANCE: CLEAR
BACTERIA: NEGATIVE
BILIRUBIN URINE: NEGATIVE
BLOOD URINE: NEGATIVE
COLOR: NORMAL
GLUCOSE QUALITATIVE U: NEGATIVE
HYALINE CASTS: 0 /LPF
KETONES URINE: NEGATIVE
LEUKOCYTE ESTERASE URINE: NEGATIVE
MICROSCOPIC-UA: NORMAL
NITRITE URINE: NEGATIVE
PH URINE: 5.5
PROTEIN URINE: NEGATIVE
RED BLOOD CELLS URINE: 1 /HPF
SPECIFIC GRAVITY URINE: 1.01
SQUAMOUS EPITHELIAL CELLS: 0 /HPF
UROBILINOGEN URINE: NORMAL
WHITE BLOOD CELLS URINE: 0 /HPF

## 2020-11-10 RX ORDER — NITROFURANTOIN MACROCRYSTALS 100 MG/1
100 CAPSULE ORAL TWICE DAILY
Qty: 20 | Refills: 0 | Status: ACTIVE | COMMUNITY
Start: 2020-11-10 | End: 1900-01-01

## 2020-11-11 LAB — BACTERIA UR CULT: NORMAL

## 2020-11-12 LAB — FUNGUS SPEC CULT ORG #8: NORMAL

## 2020-11-13 ENCOUNTER — EMERGENCY (EMERGENCY)
Facility: HOSPITAL | Age: 70
LOS: 1 days | Discharge: HOME | End: 2020-11-13
Attending: EMERGENCY MEDICINE | Admitting: EMERGENCY MEDICINE
Payer: MEDICARE

## 2020-11-13 ENCOUNTER — NON-APPOINTMENT (OUTPATIENT)
Age: 70
End: 2020-11-13

## 2020-11-13 VITALS
DIASTOLIC BLOOD PRESSURE: 77 MMHG | TEMPERATURE: 98 F | HEART RATE: 76 BPM | OXYGEN SATURATION: 100 % | RESPIRATION RATE: 18 BRPM | SYSTOLIC BLOOD PRESSURE: 135 MMHG

## 2020-11-13 LAB
ALBUMIN SERPL ELPH-MCNC: 4.7 G/DL — SIGNIFICANT CHANGE UP (ref 3.3–5)
ALP SERPL-CCNC: 88 U/L — SIGNIFICANT CHANGE UP (ref 40–120)
ALT FLD-CCNC: 21 U/L — SIGNIFICANT CHANGE UP (ref 4–33)
ANION GAP SERPL CALC-SCNC: 10 MMO/L — SIGNIFICANT CHANGE UP (ref 7–14)
APPEARANCE UR: CLEAR — SIGNIFICANT CHANGE UP
AST SERPL-CCNC: 24 U/L — SIGNIFICANT CHANGE UP (ref 4–32)
BASE EXCESS BLDV CALC-SCNC: 3.9 MMOL/L — SIGNIFICANT CHANGE UP
BASOPHILS # BLD AUTO: 0.03 K/UL — SIGNIFICANT CHANGE UP (ref 0–0.2)
BASOPHILS NFR BLD AUTO: 0.5 % — SIGNIFICANT CHANGE UP (ref 0–2)
BILIRUB SERPL-MCNC: 0.5 MG/DL — SIGNIFICANT CHANGE UP (ref 0.2–1.2)
BILIRUB UR-MCNC: NEGATIVE — SIGNIFICANT CHANGE UP
BLOOD GAS VENOUS - CREATININE: 0.73 MG/DL — SIGNIFICANT CHANGE UP (ref 0.5–1.3)
BLOOD GAS VENOUS - FIO2: 21 — SIGNIFICANT CHANGE UP
BLOOD UR QL VISUAL: NEGATIVE — SIGNIFICANT CHANGE UP
BUN SERPL-MCNC: 12 MG/DL — SIGNIFICANT CHANGE UP (ref 7–23)
CALCIUM SERPL-MCNC: 10 MG/DL — SIGNIFICANT CHANGE UP (ref 8.4–10.5)
CHLORIDE BLDV-SCNC: 107 MMOL/L — SIGNIFICANT CHANGE UP (ref 96–108)
CHLORIDE SERPL-SCNC: 103 MMOL/L — SIGNIFICANT CHANGE UP (ref 98–107)
CO2 SERPL-SCNC: 26 MMOL/L — SIGNIFICANT CHANGE UP (ref 22–31)
COLOR SPEC: YELLOW — SIGNIFICANT CHANGE UP
CREAT SERPL-MCNC: 0.73 MG/DL — SIGNIFICANT CHANGE UP (ref 0.5–1.3)
EOSINOPHIL # BLD AUTO: 0.04 K/UL — SIGNIFICANT CHANGE UP (ref 0–0.5)
EOSINOPHIL NFR BLD AUTO: 0.7 % — SIGNIFICANT CHANGE UP (ref 0–6)
GAS PNL BLDV: 137 MMOL/L — SIGNIFICANT CHANGE UP (ref 136–146)
GLUCOSE BLDV-MCNC: 99 MG/DL — SIGNIFICANT CHANGE UP (ref 70–99)
GLUCOSE SERPL-MCNC: 101 MG/DL — HIGH (ref 70–99)
GLUCOSE UR-MCNC: NEGATIVE — SIGNIFICANT CHANGE UP
HCO3 BLDV-SCNC: 25 MMOL/L — SIGNIFICANT CHANGE UP (ref 20–27)
HCT VFR BLD CALC: 44.3 % — SIGNIFICANT CHANGE UP (ref 34.5–45)
HCT VFR BLDV CALC: 44.8 % — SIGNIFICANT CHANGE UP (ref 34.5–45)
HGB BLD-MCNC: 14 G/DL — SIGNIFICANT CHANGE UP (ref 11.5–15.5)
HGB BLDV-MCNC: 14.6 G/DL — SIGNIFICANT CHANGE UP (ref 11.5–15.5)
IMM GRANULOCYTES NFR BLD AUTO: 0.4 % — SIGNIFICANT CHANGE UP (ref 0–1.5)
KETONES UR-MCNC: NEGATIVE — SIGNIFICANT CHANGE UP
LACTATE BLDV-MCNC: 0.8 MMOL/L — SIGNIFICANT CHANGE UP (ref 0.5–2)
LEUKOCYTE ESTERASE UR-ACNC: NEGATIVE — SIGNIFICANT CHANGE UP
LYMPHOCYTES # BLD AUTO: 0.88 K/UL — LOW (ref 1–3.3)
LYMPHOCYTES # BLD AUTO: 15.7 % — SIGNIFICANT CHANGE UP (ref 13–44)
MCHC RBC-ENTMCNC: 29.5 PG — SIGNIFICANT CHANGE UP (ref 27–34)
MCHC RBC-ENTMCNC: 31.6 % — LOW (ref 32–36)
MCV RBC AUTO: 93.3 FL — SIGNIFICANT CHANGE UP (ref 80–100)
MONOCYTES # BLD AUTO: 0.33 K/UL — SIGNIFICANT CHANGE UP (ref 0–0.9)
MONOCYTES NFR BLD AUTO: 5.9 % — SIGNIFICANT CHANGE UP (ref 2–14)
NEUTROPHILS # BLD AUTO: 4.32 K/UL — SIGNIFICANT CHANGE UP (ref 1.8–7.4)
NEUTROPHILS NFR BLD AUTO: 76.8 % — SIGNIFICANT CHANGE UP (ref 43–77)
NITRITE UR-MCNC: NEGATIVE — SIGNIFICANT CHANGE UP
NRBC # FLD: 0 K/UL — SIGNIFICANT CHANGE UP (ref 0–0)
PCO2 BLDV: 55 MMHG — HIGH (ref 41–51)
PH BLDV: 7.35 PH — SIGNIFICANT CHANGE UP (ref 7.32–7.43)
PH UR: 6 — SIGNIFICANT CHANGE UP (ref 5–8)
PLATELET # BLD AUTO: 227 K/UL — SIGNIFICANT CHANGE UP (ref 150–400)
PMV BLD: 9.7 FL — SIGNIFICANT CHANGE UP (ref 7–13)
PO2 BLDV: < 24 MMHG — LOW (ref 35–40)
POTASSIUM BLDV-SCNC: 3.8 MMOL/L — SIGNIFICANT CHANGE UP (ref 3.4–4.5)
POTASSIUM SERPL-MCNC: 4 MMOL/L — SIGNIFICANT CHANGE UP (ref 3.5–5.3)
POTASSIUM SERPL-SCNC: 4 MMOL/L — SIGNIFICANT CHANGE UP (ref 3.5–5.3)
PROT SERPL-MCNC: 8.4 G/DL — HIGH (ref 6–8.3)
PROT UR-MCNC: NEGATIVE — SIGNIFICANT CHANGE UP
RBC # BLD: 4.75 M/UL — SIGNIFICANT CHANGE UP (ref 3.8–5.2)
RBC # FLD: 13.1 % — SIGNIFICANT CHANGE UP (ref 10.3–14.5)
SAO2 % BLDV: 15.8 % — LOW (ref 60–85)
SODIUM SERPL-SCNC: 139 MMOL/L — SIGNIFICANT CHANGE UP (ref 135–145)
SP GR SPEC: 1.01 — SIGNIFICANT CHANGE UP (ref 1–1.04)
UROBILINOGEN FLD QL: NORMAL — SIGNIFICANT CHANGE UP
WBC # BLD: 5.62 K/UL — SIGNIFICANT CHANGE UP (ref 3.8–10.5)
WBC # FLD AUTO: 5.62 K/UL — SIGNIFICANT CHANGE UP (ref 3.8–10.5)

## 2020-11-13 PROCEDURE — 99284 EMERGENCY DEPT VISIT MOD MDM: CPT | Mod: GC

## 2020-11-13 RX ORDER — FLUCONAZOLE 150 MG/1
1 TABLET ORAL
Qty: 1 | Refills: 0
Start: 2020-11-13 | End: 2020-11-13

## 2020-11-13 RX ORDER — FLUCONAZOLE 150 MG/1
1 TABLET ORAL
Qty: 3 | Refills: 0
Start: 2020-11-13 | End: 2020-11-15

## 2020-11-13 RX ORDER — PHENAZOPYRIDINE HCL 100 MG
200 TABLET ORAL ONCE
Refills: 0 | Status: COMPLETED | OUTPATIENT
Start: 2020-11-13 | End: 2020-11-13

## 2020-11-13 RX ORDER — CEFPODOXIME PROXETIL 100 MG
1 TABLET ORAL
Qty: 20 | Refills: 0
Start: 2020-11-13 | End: 2020-11-22

## 2020-11-13 RX ORDER — CEFTRIAXONE 500 MG/1
1000 INJECTION, POWDER, FOR SOLUTION INTRAMUSCULAR; INTRAVENOUS ONCE
Refills: 0 | Status: COMPLETED | OUTPATIENT
Start: 2020-11-13 | End: 2020-11-13

## 2020-11-13 RX ADMIN — CEFTRIAXONE 1000 MILLIGRAM(S): 500 INJECTION, POWDER, FOR SOLUTION INTRAMUSCULAR; INTRAVENOUS at 06:41

## 2020-11-13 RX ADMIN — CEFTRIAXONE 100 MILLIGRAM(S): 500 INJECTION, POWDER, FOR SOLUTION INTRAMUSCULAR; INTRAVENOUS at 06:05

## 2020-11-13 RX ADMIN — Medication 200 MILLIGRAM(S): at 07:14

## 2020-11-13 NOTE — ED PROVIDER NOTE - OBJECTIVE STATEMENT
70F PMH colorectal ca s/p resection and colostomy reversal 1 year ago, vertigo, recurrent UTIs p/w urinary frequency x 3 days. Pt was seen in ~3 weeks ago for similar sx, received 1 dose of Ceftriaxone with improvement in sx. UA+, but upon chart review, UCx was negative at that time. Was discharged with 1 dose of Fosfomycin, which pt states upset her stomach. Pt then followed up with urology Dr. Calles, last 3 days ago when her sx started again. Pt has been taking Macrobid BID since Tuesday. Denies f/c, n/v, abdominal pain, flank pain. No abnormal vaginal discharge. 70F PMH colorectal ca s/p resection and colostomy reversal 1 year ago, vertigo, recurrent UTIs p/w urinary frequency a/w bladder fullness x 3 days. Pt was seen in ~3 weeks ago for similar sx, received 1 dose of Ceftriaxone with improvement in sx. UA+, but upon chart review, UCx was negative at that time. Was discharged with 1 dose of Fosfomycin, which pt states upset her stomach. Pt then followed up with urology Dr. Calles, last 3 days ago when her sx started again. Pt has been taking Macrobid BID since Tuesday. Denies f/c, n/v, abdominal pain, flank pain. No abnormal vaginal discharge.

## 2020-11-13 NOTE — ED PROVIDER NOTE - PROGRESS NOTE DETAILS
Hawk, PGY2 – Pt was re-evaluated at bedside, VSS, feeling well overall. Results were discussed with patient as well as return precautions and follow up plan with PCP and/or specialist. Time was taken to answer any questions that the patient had before providing them with discharge paperwork.

## 2020-11-13 NOTE — ED PROVIDER NOTE - CLINICAL SUMMARY MEDICAL DECISION MAKING FREE TEXT BOX
Hawk, PGY2 - 70F PMH includes recurrent UTIs p/w urinary frequency x 3 days. Has completed 3 days of Macrobid. No systemic sx. VSS, well-appearing, abdomen nontender, no CVA tenderness. Low suspicion for pyelo or abscess. Will obtain UA, screening labs, Ceftriaxone, likely tbdc course of Cefpodoxime and outpatient urology f/u

## 2020-11-13 NOTE — ED POST DISCHARGE NOTE - RESULT SUMMARY
NHAN Thomas: Pt called back to clarify if venous o2 level was accurate on vbg, explained this is a venous sample and does not reflect her true o2 levels.

## 2020-11-13 NOTE — ED PROVIDER NOTE - ATTENDING CONTRIBUTION TO CARE
MD Grimm:  I performed a face to face bedside interview with patient regarding history of present illness, review of symptoms and past medical history. I completed an independent physical exam(documented below).  I have discussed patient's plan of care with resident.   I agree with note as stated above, having amended the EMR as needed to reflect my findings. I have discussed the assessment and plan of care.  This includes during the time I functioned as the attending physician for this patient.  PE:  Gen: Alert, NAD  Head: NC, AT,  EOMI, normal lids/conjunctiva  ENT:  normal hearing, patent oropharynx without erythema/exudate  Neck: +supple, no tenderness/meningismus/JVD, +Trachea midline  Chest: no chest wall tenderness, equal chest rise  Pulm: Bilateral BS, normal resp effort, no wheeze/stridor/retractions  CV: RRR, no M/R/G, +dist pulses  Abd: +BS, soft, NT/ND  Rectal: deferred  Mskel: no edema/erythema/cyanosis  Skin: no rash  Neuro: AAOx3  MDM:   71yo F pmh of colorectal ca s/p partial resection and recurrent UTIs (seen in our ED recently for this and subsequently at the urology clinic), has taken macrobid X 3days, returns w/ persistent suprapubic pressure and urinary frequency. Denies f/c. labs, ua, iv ceftriaxone, and dc on cefpodoxime as pt likely already failed outpt macrobid tx (symptoms not improving after 3 days).

## 2020-11-13 NOTE — ED ADULT NURSE NOTE - OBJECTIVE STATEMENT
Pt received to Room 16 A&Ox4 and ambulatory. Pt C/O frequent UTIs, with new onset of "bladder throb" that woke her up. Pt states she has been experiencing these symptoms for 1 month on and off. Denies pain, but states she feels pressure in her bladder. Pt denies N+V, fever, chills, weakness, dizziness. Pt states she took azo and macrobid prior to arrival for symptoms. 20G IV placed in LAC and labs drawn as ordered. MD evaluation in progress. Will continue to monitor

## 2020-11-13 NOTE — ED PROVIDER NOTE - NS ED ROS FT
General: Denies fevers or chills  Eyes: Denies vision changes  ENMT: Denies trouble swallowing or speaking, or sore throat  CV: Denies chest pain or palpitations  Resp: Denies cough or SOB  GI: Denies abdominal pain, nausea, vomiting, diarrhea, or constipation   : +Urinary frequency. Denies painful urination or blood in urine  Skin: Denies new rashes  Neuro: Denies headache, numbness, or weakness  MSK: Denies recent trauma
No

## 2020-11-13 NOTE — ED ADULT NURSE REASSESSMENT NOTE - NS ED NURSE REASSESS COMMENT FT1
Pt complaining of continued bladder pressure. MD made aware. Pyridium ordered by MD. Pharmacy contacted by message and phone call at 0710. Will medicate as ordered upon arrival.

## 2020-11-13 NOTE — ED PROVIDER NOTE - PATIENT PORTAL LINK FT
You can access the FollowMyHealth Patient Portal offered by [facility] by registering at the following website: [email or web address]. By joining [email or web address]’s FollowMyHealth portal, you will also be able to view your health information using other applications (apps) compatible with our system.

## 2020-11-13 NOTE — ED PROVIDER NOTE - CARE PROVIDER_API CALL
Idris Calles  Urology  [address] [place] [address]  Phone: [phone]  Fax: [phone]  Follow Up Time: 1-3 Days

## 2020-11-13 NOTE — ED ADULT TRIAGE NOTE - CHIEF COMPLAINT QUOTE
Pt. states she was seen here about 2 weeks ago, diagnosed with UTI. Arrives today c/o "bladder throbbing," urinary urgency, frequency. Reports taking Macrobid and azo prior to arrival. Denies hematuria, fever, chills, flank pain. PMHx: colorectal cancer

## 2020-11-14 LAB
CULTURE RESULTS: SIGNIFICANT CHANGE UP
SPECIMEN SOURCE: SIGNIFICANT CHANGE UP

## 2020-11-16 ENCOUNTER — INPATIENT (INPATIENT)
Facility: HOSPITAL | Age: 70
LOS: 1 days | Discharge: HOME | End: 2020-11-18
Attending: GENERAL ACUTE CARE HOSPITAL | Admitting: GENERAL ACUTE CARE HOSPITAL
Payer: MEDICARE

## 2020-11-16 VITALS
RESPIRATION RATE: 18 BRPM | HEART RATE: 68 BPM | OXYGEN SATURATION: 100 % | SYSTOLIC BLOOD PRESSURE: 140 MMHG | TEMPERATURE: 98 F | DIASTOLIC BLOOD PRESSURE: 58 MMHG

## 2020-11-16 DIAGNOSIS — Z29.9 ENCOUNTER FOR PROPHYLACTIC MEASURES, UNSPECIFIED: ICD-10-CM

## 2020-11-16 DIAGNOSIS — R30.0 DYSURIA: ICD-10-CM

## 2020-11-16 DIAGNOSIS — N39.0 URINARY TRACT INFECTION, SITE NOT SPECIFIED: ICD-10-CM

## 2020-11-16 LAB
ALBUMIN SERPL ELPH-MCNC: 4.2 G/DL — SIGNIFICANT CHANGE UP (ref 3.3–5)
ALP SERPL-CCNC: 81 U/L — SIGNIFICANT CHANGE UP (ref 40–120)
ALT FLD-CCNC: 20 U/L — SIGNIFICANT CHANGE UP (ref 4–33)
ANION GAP SERPL CALC-SCNC: 10 MMO/L — SIGNIFICANT CHANGE UP (ref 7–14)
APPEARANCE UR: SIGNIFICANT CHANGE UP
AST SERPL-CCNC: 19 U/L — SIGNIFICANT CHANGE UP (ref 4–32)
BACTERIA # UR AUTO: NEGATIVE — SIGNIFICANT CHANGE UP
BASOPHILS # BLD AUTO: 0.03 K/UL — SIGNIFICANT CHANGE UP (ref 0–0.2)
BASOPHILS NFR BLD AUTO: 0.5 % — SIGNIFICANT CHANGE UP (ref 0–2)
BILIRUB SERPL-MCNC: 0.3 MG/DL — SIGNIFICANT CHANGE UP (ref 0.2–1.2)
BILIRUB UR-MCNC: HIGH
BLOOD UR QL VISUAL: NEGATIVE — SIGNIFICANT CHANGE UP
BUN SERPL-MCNC: 12 MG/DL — SIGNIFICANT CHANGE UP (ref 7–23)
CALCIUM SERPL-MCNC: 9.7 MG/DL — SIGNIFICANT CHANGE UP (ref 8.4–10.5)
CHLORIDE SERPL-SCNC: 102 MMOL/L — SIGNIFICANT CHANGE UP (ref 98–107)
CO2 SERPL-SCNC: 24 MMOL/L — SIGNIFICANT CHANGE UP (ref 22–31)
COLOR SPEC: SIGNIFICANT CHANGE UP
CREAT SERPL-MCNC: 0.74 MG/DL — SIGNIFICANT CHANGE UP (ref 0.5–1.3)
EOSINOPHIL # BLD AUTO: 0.02 K/UL — SIGNIFICANT CHANGE UP (ref 0–0.5)
EOSINOPHIL NFR BLD AUTO: 0.3 % — SIGNIFICANT CHANGE UP (ref 0–6)
GLUCOSE SERPL-MCNC: 105 MG/DL — HIGH (ref 70–99)
GLUCOSE UR-MCNC: NEGATIVE — SIGNIFICANT CHANGE UP
HCT VFR BLD CALC: 42.3 % — SIGNIFICANT CHANGE UP (ref 34.5–45)
HGB BLD-MCNC: 12.9 G/DL — SIGNIFICANT CHANGE UP (ref 11.5–15.5)
HYALINE CASTS # UR AUTO: NEGATIVE — SIGNIFICANT CHANGE UP
IMM GRANULOCYTES NFR BLD AUTO: 0.3 % — SIGNIFICANT CHANGE UP (ref 0–1.5)
KETONES UR-MCNC: NEGATIVE — SIGNIFICANT CHANGE UP
LEUKOCYTE ESTERASE UR-ACNC: NEGATIVE — SIGNIFICANT CHANGE UP
LYMPHOCYTES # BLD AUTO: 0.94 K/UL — LOW (ref 1–3.3)
LYMPHOCYTES # BLD AUTO: 15.1 % — SIGNIFICANT CHANGE UP (ref 13–44)
MCHC RBC-ENTMCNC: 28.6 PG — SIGNIFICANT CHANGE UP (ref 27–34)
MCHC RBC-ENTMCNC: 30.5 % — LOW (ref 32–36)
MCV RBC AUTO: 93.8 FL — SIGNIFICANT CHANGE UP (ref 80–100)
MONOCYTES # BLD AUTO: 0.31 K/UL — SIGNIFICANT CHANGE UP (ref 0–0.9)
MONOCYTES NFR BLD AUTO: 5 % — SIGNIFICANT CHANGE UP (ref 2–14)
NEUTROPHILS # BLD AUTO: 4.9 K/UL — SIGNIFICANT CHANGE UP (ref 1.8–7.4)
NEUTROPHILS NFR BLD AUTO: 78.8 % — HIGH (ref 43–77)
NITRITE UR-MCNC: POSITIVE — HIGH
NRBC # FLD: 0 K/UL — SIGNIFICANT CHANGE UP (ref 0–0)
PH UR: 6 — SIGNIFICANT CHANGE UP (ref 5–8)
PLATELET # BLD AUTO: 220 K/UL — SIGNIFICANT CHANGE UP (ref 150–400)
PMV BLD: 9.7 FL — SIGNIFICANT CHANGE UP (ref 7–13)
POTASSIUM SERPL-MCNC: 3.9 MMOL/L — SIGNIFICANT CHANGE UP (ref 3.5–5.3)
POTASSIUM SERPL-SCNC: 3.9 MMOL/L — SIGNIFICANT CHANGE UP (ref 3.5–5.3)
PROT SERPL-MCNC: 7.5 G/DL — SIGNIFICANT CHANGE UP (ref 6–8.3)
PROT UR-MCNC: 20 — SIGNIFICANT CHANGE UP
RBC # BLD: 4.51 M/UL — SIGNIFICANT CHANGE UP (ref 3.8–5.2)
RBC # FLD: 13.3 % — SIGNIFICANT CHANGE UP (ref 10.3–14.5)
RBC CASTS # UR COMP ASSIST: SIGNIFICANT CHANGE UP (ref 0–?)
SARS-COV-2 RNA SPEC QL NAA+PROBE: SIGNIFICANT CHANGE UP
SODIUM SERPL-SCNC: 136 MMOL/L — SIGNIFICANT CHANGE UP (ref 135–145)
SP GR SPEC: 1.02 — SIGNIFICANT CHANGE UP (ref 1–1.04)
SQUAMOUS # UR AUTO: SIGNIFICANT CHANGE UP
UROBILINOGEN FLD QL: HIGH
WBC # BLD: 6.22 K/UL — SIGNIFICANT CHANGE UP (ref 3.8–10.5)
WBC # FLD AUTO: 6.22 K/UL — SIGNIFICANT CHANGE UP (ref 3.8–10.5)
WBC UR QL: SIGNIFICANT CHANGE UP (ref 0–?)

## 2020-11-16 PROCEDURE — 99284 EMERGENCY DEPT VISIT MOD MDM: CPT

## 2020-11-16 PROCEDURE — 99223 1ST HOSP IP/OBS HIGH 75: CPT

## 2020-11-16 RX ORDER — CHOLECALCIFEROL (VITAMIN D3) 125 MCG
2000 CAPSULE ORAL DAILY
Refills: 0 | Status: DISCONTINUED | OUTPATIENT
Start: 2020-11-16 | End: 2020-11-18

## 2020-11-16 RX ORDER — PHENAZOPYRIDINE HCL 100 MG
100 TABLET ORAL EVERY 8 HOURS
Refills: 0 | Status: DISCONTINUED | OUTPATIENT
Start: 2020-11-16 | End: 2020-11-18

## 2020-11-16 RX ORDER — ASCORBIC ACID 60 MG
500 TABLET,CHEWABLE ORAL DAILY
Refills: 0 | Status: DISCONTINUED | OUTPATIENT
Start: 2020-11-16 | End: 2020-11-18

## 2020-11-16 RX ORDER — CEFTRIAXONE 500 MG/1
1000 INJECTION, POWDER, FOR SOLUTION INTRAMUSCULAR; INTRAVENOUS EVERY 24 HOURS
Refills: 0 | Status: DISCONTINUED | OUTPATIENT
Start: 2020-11-17 | End: 2020-11-18

## 2020-11-16 RX ORDER — ACETAMINOPHEN 500 MG
975 TABLET ORAL ONCE
Refills: 0 | Status: COMPLETED | OUTPATIENT
Start: 2020-11-16 | End: 2020-11-16

## 2020-11-16 RX ORDER — KETOTIFEN FUMARATE 0.34 MG/ML
1 SOLUTION OPHTHALMIC
Refills: 0 | Status: DISCONTINUED | OUTPATIENT
Start: 2020-11-16 | End: 2020-11-18

## 2020-11-16 RX ORDER — ENOXAPARIN SODIUM 100 MG/ML
40 INJECTION SUBCUTANEOUS DAILY
Refills: 0 | Status: DISCONTINUED | OUTPATIENT
Start: 2020-11-16 | End: 2020-11-18

## 2020-11-16 RX ORDER — CEFTRIAXONE 500 MG/1
1000 INJECTION, POWDER, FOR SOLUTION INTRAMUSCULAR; INTRAVENOUS ONCE
Refills: 0 | Status: COMPLETED | OUTPATIENT
Start: 2020-11-16 | End: 2020-11-16

## 2020-11-16 RX ADMIN — Medication 500 MILLIGRAM(S): at 12:17

## 2020-11-16 RX ADMIN — Medication 100 MILLIGRAM(S): at 20:53

## 2020-11-16 RX ADMIN — Medication 2000 UNIT(S): at 12:18

## 2020-11-16 RX ADMIN — CEFTRIAXONE 100 MILLIGRAM(S): 500 INJECTION, POWDER, FOR SOLUTION INTRAMUSCULAR; INTRAVENOUS at 07:01

## 2020-11-16 NOTE — ED PROVIDER NOTE - OBJECTIVE STATEMENT
71 yo F coming in for polyuria and burning with urination x1 month.  Patient has been seen in the ED for similar complaints and has failed macrobid treatment prompting her most recently on 11/13/2020 and was given a dose of ceftriaxone and discharged with Cefpodoxime. Patient states she has taken her cefpodoxime with initial resolution of symptoms but on the following day, had continued polyuria and burning. Patient denies any fever/chills, n/v/d, or flank pain.

## 2020-11-16 NOTE — ED PROVIDER NOTE - NS ED ROS FT
General: denies fever, chills  HENT: denies nasal congestion, rhinorrhea  Eyes: denies visual changes, blurred vision  CV: denies chest pain, palpitations  Resp: denies difficulty breathing, cough  Abdominal: denies nausea, vomiting, diarrhea, abdominal pain  : polyuria and dysuria  MSK: denies muscle aches, leg swelling  Neuro: denies headaches, numbness, tingling  Skin: denies rashes, bruises

## 2020-11-16 NOTE — ED PROVIDER NOTE - CLINICAL SUMMARY MEDICAL DECISION MAKING FREE TEXT BOX
69 yo F coming in for persistent polyuria and burning despite 2 separate AB use. Patient now appears to have failed outpatient management and will require inpatient IV AB care.

## 2020-11-16 NOTE — H&P ADULT - PROBLEM SELECTOR PLAN 1
- p/w dysuria, increased urinary frequency, SIRS negative  - s/p Macrobid and cefpodoxime outpatient, last urine cultures 11/10 (allscripts) and 11/13 negative, prior cultures 10/25/2020 pansensitive E.Coli   - UA not overtly positive (+nitrites, otherwise neg LE, WBC, bacteria) in setting of recent antibiotic use  - empiric CTX pending cultures, pyridium PRN  - A1C to r/o other causes of increased frequency, bladder scan for PVR - p/w dysuria, increased urinary frequency, SIRS negative  - s/p Macrobid and cefpodoxime outpatient, last urine cultures 11/10 (allscripts) and 11/13 negative, prior cultures 10/25/2020 pansensitive E.Coli   - UA not overtly positive (+nitrites, otherwise neg LE, WBC, bacteria) in setting of recent antibiotics  - empiric CTX pending cultures, pyridium PRN  - A1C to r/o other causes of increased frequency, bladder scan for PVR

## 2020-11-16 NOTE — H&P ADULT - NSHPREVIEWOFSYSTEMS_GEN_ALL_CORE
Review of Systems:   CONSTITUTIONAL: No fever, no fatigue  EYES: No eye pain or discharge  ENMT:  No sinus or throat pain  NECK: No pain or stiffness  RESPIRATORY: No cough, wheezing, chills or hemoptysis; No shortness of breath  CARDIOVASCULAR: No chest pain, palpitations, dizziness, or leg swelling  GASTROINTESTINAL: No abdominal or epigastric pain. No nausea, vomiting, or hematemesis; No diarrhea or constipation. No melena or hematochezia.  GENITOURINARY: +dysuria, increased urinary frequency  MUSCULOSKELETAL: No joint pain or swelling; No muscle, back, or extremity pain  NEUROLOGICAL: No headaches, memory loss, loss of strength, numbness, or tremors  PSYCHIATRIC: No depression, anxiety, mood swings, or difficulty sleeping  SKIN: No rashes, no skin changes

## 2020-11-16 NOTE — ED ADULT TRIAGE NOTE - CHIEF COMPLAINT QUOTE
pt c/o polyuria and burning with urination x1 month. Pt states has been treated for UTI multiple times last seen in ED Friday and was told to come back if symptoms persist for admission. Pt states felt better but symptoms began again last night

## 2020-11-16 NOTE — H&P ADULT - ASSESSMENT
70F hx of recurrent UTIs, colorectal cancer s/p resection, colostomy reversal, who presents with dysuria, and increased urinary frequency despite outpatient PO abx, a/w UTI.

## 2020-11-16 NOTE — ED PROVIDER NOTE - PHYSICAL EXAMINATION
GENERAL: well appearing in no acute distress, non-toxic appearing  HEAD: normocephalic, atraumatic  HENT: airway intact  EYES: normal conjunctiva   CARDIAC: regular rate and rhythm, normal S1S2, no appreciable murmurs, 2+ pulses in UE/LE b/l  PULM: normal breath sounds, clear to ascultation bilaterally, no rales, rhonchi, wheezing  GI: abdomen nondistended, soft, nontender, no guarding, rebound tenderness  : no CVA tenderness b/l, no suprapubic tenderness  NEURO: no focal motor or sensory deficits,  MSK: no peripheral edema, no calf tenderness b/l  SKIN: well-perfused, extremities warm, no visible rashes  PSYCH: appropriate mood and affect GENERAL: well appearing in no acute distress, non-toxic appearing  HEAD: normocephalic, atraumatic  HENT: airway intact  EYES: normal conjunctiva   CARDIAC: regular rate and rhythm, normal S1S2, no appreciable murmurs, 2+ pulses in UE/LE b/l  PULM: normal breath sounds, clear to ascultation bilaterally, no rales, rhonchi, wheezing  GI: abdomen nondistended, soft, nontender, no guarding, rebound tenderness  : no CVA tenderness b/l, minor suprapubic tenderness  NEURO: no focal motor or sensory deficits,  MSK: no peripheral edema, no calf tenderness b/l  SKIN: well-perfused, extremities warm, no visible rashes  PSYCH: appropriate mood and affect

## 2020-11-16 NOTE — H&P ADULT - NSHPPHYSICALEXAM_GEN_ALL_CORE
Vital Signs Last 24 Hrs  T(C): 37.1 (16 Nov 2020 08:36), Max: 37.1 (16 Nov 2020 08:36)  T(F): 98.7 (16 Nov 2020 08:36), Max: 98.7 (16 Nov 2020 08:36)  HR: 78 (16 Nov 2020 08:36) (67 - 78)  BP: 168/89 (16 Nov 2020 08:36) (140/58 - 168/89)  RR: 18 (16 Nov 2020 08:36) (17 - 18)  SpO2: 98% (16 Nov 2020 08:36) (98% - 100%)    CONSTITUTIONAL: well-developed, well-groomed, no apparent distress  EYES: no conjunctival or scleral injection, non-icteric, PERRLA  ENMT: no external nasal lesions, oral mucosa with moist membranes  NECK: trachea midline, no palpable neck mass   RESPIRATORY: breathing comfortably, lungs CTA without wheeze/rales/rhonchi  CARDIOVASCULAR: regular rate and rhythm; +S1S2, no murmurs, rubs, or gallops, no lower extremity edema, 2+ peripheral pulses  GASTROINTESTINAL: soft, nontender, nondistended; +BS throughout, no rebound/guarding  MUSCULOSKELETAL: no joint effusions, normal strength and tone of extremities   NEUROLOGIC: non-focal, sensation intact to light touch in b/l upper and lower extremities   PSYCHIATRIC: AAOx3, appropriate mood and affect  SKIN: warm and dry

## 2020-11-16 NOTE — H&P ADULT - NSHPLABSRESULTS_GEN_ALL_CORE
12.9   6.22  )-----------( 220      ( 2020 06:40 )             42.3     -    136  |  102  |  12  ----------------------------<  105<H>  3.9   |  24  |  0.74    Ca    9.7      2020 06:40    TPro  7.5  /  Alb  4.2  /  TBili  0.3  /  DBili  x   /  AST  19  /  ALT  20  /  AlkPhos  81  11-16    Urinalysis Basic - ( 2020 07:05 )    Color: DARK BROWN / Appearance: Lt TURBID / S.019 / pH: 6.0  Gluc: NEGATIVE / Ketone: NEGATIVE  / Bili: SMALL / Urobili: MODERATE   Blood: NEGATIVE / Protein: 20 / Nitrite: POSITIVE   Leuk Esterase: NEGATIVE / RBC: 0-2 / WBC 0-2   Sq Epi: OCC / Non Sq Epi: x / Bacteria: NEGATIVE

## 2020-11-16 NOTE — ED ADULT NURSE NOTE - OBJECTIVE STATEMENT
70 year old female with multiple UTIs past month coming in for urinary frequency. As per pt, she was discharged on Friday and was told to come back if she was having symptoms. "I started having urinary frequency last time and that's  how I know my symptoms are coming back." Pt denies fever, chills, sob , chest pain, burning, blood in urine, itching, odor. VSS. 70 year old female with multiple UTIs past month coming in for urinary frequency. As per pt, she was discharged on Friday and was told to come back if she was having symptoms. "I started having urinary frequency last time and that's  how I know my symptoms are coming back." Pt denies fever, chills, sob , chest pain, burning, blood in urine, itching, odor. VSS. 20g placed in left a/c, labs drawn.

## 2020-11-16 NOTE — ED PROVIDER NOTE - ATTENDING CONTRIBUTION TO CARE
MD Grimm:  I performed a face to face bedside interview with patient regarding history of present illness, review of symptoms and past medical history. I completed an independent physical exam(documented below).  I have discussed patient's plan of care with resident.   I agree with note as stated above, having amended the EMR as needed to reflect my findings. I have discussed the assessment and plan of care.  This includes during the time I functioned as the attending physician for this patient.  PE:  Gen: Alert, NAD  Head: NC, AT,  EOMI, normal lids/conjunctiva  ENT:  normal hearing, patent oropharynx without erythema/exudate  Neck: +supple, no tenderness/meningismus/JVD, +Trachea midline  Chest: no chest wall tenderness, equal chest rise  Pulm: Bilateral BS, normal resp effort, no wheeze/stridor/retractions  CV: RRR, no M/R/G, +dist pulses  Abd: +BS, soft, ND, +suprapubic ttp  Rectal: deferred  Mskel: no edema/erythema/cyanosis  Skin: no rash  Neuro: AAOx3  MDM:   71yo F pmh colorectal ca s/p partial resection and recurrent UTIs now presenting to our ED for 3rd time in 3wks for worsening suprapubic pressure/cramping sharp pain and urinary frequency (also seen in urology clinic within that time). Pt stating she has been compliant with both macrobid and cefpodoxime without resolution of symptoms. When seen during 2nd ED visit (by me), pt demanded IV abx at that time, gave dose of ceftriaxone and convinced pt to take cefpodoxime/pyridium at home (despite her reluctance to try po abx other than macrobid for fear of GI upset - explained fact that benefits of treating presumed UTI would outweigh risk of GI upset/transient diarrhea). Of note, differential dx also includes Given that this is now pt's 3rd visit for recurrent/persistent symptoms as well as her age, and reluctance to try other outpt po abx, will admit for IV abx/pain mngmt, and uro consult.

## 2020-11-16 NOTE — H&P ADULT - HISTORY OF PRESENT ILLNESS
70F hx of recurrent UTIs, colorectal cancer s/p resection, colostomy reversal, who presents with dysuria, and increased urinary frequency. Patient initially developed symptoms on 11/10, had UA done with outpatient urologist which was negative, and was started on Macrobid. She had minimal improvement with Macrobid, presented to ED 11/13, discharged with cefpodoxime and pyridium. She has been taking the medications as prescribed but was still experiencing dysuria and urinary frequency, prompting her to return to the ED today. Of note, patient says she has about 2-3 UTIs a year, last in October (pansensitive E.coli). She denies fevers, chills, cp, sob, abdominal pain, n/v/d.     ED vitals: Tm 98.7, HR 78, /89, RR 18, 98% on RA

## 2020-11-17 ENCOUNTER — APPOINTMENT (OUTPATIENT)
Dept: UROLOGY | Facility: CLINIC | Age: 70
End: 2020-11-17

## 2020-11-17 LAB
ALBUMIN SERPL ELPH-MCNC: 4.1 G/DL — SIGNIFICANT CHANGE UP (ref 3.3–5)
ALP SERPL-CCNC: 78 U/L — SIGNIFICANT CHANGE UP (ref 40–120)
ALT FLD-CCNC: 22 U/L — SIGNIFICANT CHANGE UP (ref 4–33)
ANION GAP SERPL CALC-SCNC: 10 MMO/L — SIGNIFICANT CHANGE UP (ref 7–14)
ANION GAP SERPL CALC-SCNC: 10 MMO/L — SIGNIFICANT CHANGE UP (ref 7–14)
AST SERPL-CCNC: 22 U/L — SIGNIFICANT CHANGE UP (ref 4–32)
BILIRUB SERPL-MCNC: 0.4 MG/DL — SIGNIFICANT CHANGE UP (ref 0.2–1.2)
BUN SERPL-MCNC: 13 MG/DL — SIGNIFICANT CHANGE UP (ref 7–23)
BUN SERPL-MCNC: 13 MG/DL — SIGNIFICANT CHANGE UP (ref 7–23)
CALCIUM SERPL-MCNC: 9.7 MG/DL — SIGNIFICANT CHANGE UP (ref 8.4–10.5)
CALCIUM SERPL-MCNC: 9.7 MG/DL — SIGNIFICANT CHANGE UP (ref 8.4–10.5)
CHLORIDE SERPL-SCNC: 102 MMOL/L — SIGNIFICANT CHANGE UP (ref 98–107)
CHLORIDE SERPL-SCNC: 102 MMOL/L — SIGNIFICANT CHANGE UP (ref 98–107)
CO2 SERPL-SCNC: 27 MMOL/L — SIGNIFICANT CHANGE UP (ref 22–31)
CO2 SERPL-SCNC: 27 MMOL/L — SIGNIFICANT CHANGE UP (ref 22–31)
CREAT SERPL-MCNC: 0.73 MG/DL — SIGNIFICANT CHANGE UP (ref 0.5–1.3)
CREAT SERPL-MCNC: 0.73 MG/DL — SIGNIFICANT CHANGE UP (ref 0.5–1.3)
CULTURE RESULTS: SIGNIFICANT CHANGE UP
GLUCOSE SERPL-MCNC: 94 MG/DL — SIGNIFICANT CHANGE UP (ref 70–99)
GLUCOSE SERPL-MCNC: 94 MG/DL — SIGNIFICANT CHANGE UP (ref 70–99)
HBA1C BLD-MCNC: 5.7 % — HIGH (ref 4–5.6)
HCT VFR BLD CALC: 40.3 % — SIGNIFICANT CHANGE UP (ref 34.5–45)
HGB BLD-MCNC: 12.4 G/DL — SIGNIFICANT CHANGE UP (ref 11.5–15.5)
MAGNESIUM SERPL-MCNC: 2.2 MG/DL — SIGNIFICANT CHANGE UP (ref 1.6–2.6)
MCHC RBC-ENTMCNC: 28.7 PG — SIGNIFICANT CHANGE UP (ref 27–34)
MCHC RBC-ENTMCNC: 30.8 % — LOW (ref 32–36)
MCV RBC AUTO: 93.3 FL — SIGNIFICANT CHANGE UP (ref 80–100)
NRBC # FLD: 0 K/UL — SIGNIFICANT CHANGE UP (ref 0–0)
PHOSPHATE SERPL-MCNC: 3 MG/DL — SIGNIFICANT CHANGE UP (ref 2.5–4.5)
PLATELET # BLD AUTO: 213 K/UL — SIGNIFICANT CHANGE UP (ref 150–400)
PMV BLD: 10.1 FL — SIGNIFICANT CHANGE UP (ref 7–13)
POTASSIUM SERPL-MCNC: 3.9 MMOL/L — SIGNIFICANT CHANGE UP (ref 3.5–5.3)
POTASSIUM SERPL-MCNC: 3.9 MMOL/L — SIGNIFICANT CHANGE UP (ref 3.5–5.3)
POTASSIUM SERPL-SCNC: 3.9 MMOL/L — SIGNIFICANT CHANGE UP (ref 3.5–5.3)
POTASSIUM SERPL-SCNC: 3.9 MMOL/L — SIGNIFICANT CHANGE UP (ref 3.5–5.3)
PROT SERPL-MCNC: 6.9 G/DL — SIGNIFICANT CHANGE UP (ref 6–8.3)
RBC # BLD: 4.32 M/UL — SIGNIFICANT CHANGE UP (ref 3.8–5.2)
RBC # FLD: 13.2 % — SIGNIFICANT CHANGE UP (ref 10.3–14.5)
SODIUM SERPL-SCNC: 139 MMOL/L — SIGNIFICANT CHANGE UP (ref 135–145)
SODIUM SERPL-SCNC: 139 MMOL/L — SIGNIFICANT CHANGE UP (ref 135–145)
SPECIMEN SOURCE: SIGNIFICANT CHANGE UP
WBC # BLD: 5.47 K/UL — SIGNIFICANT CHANGE UP (ref 3.8–10.5)
WBC # FLD AUTO: 5.47 K/UL — SIGNIFICANT CHANGE UP (ref 3.8–10.5)

## 2020-11-17 RX ORDER — ASCORBIC ACID 60 MG
1 TABLET,CHEWABLE ORAL
Qty: 0 | Refills: 0 | DISCHARGE

## 2020-11-17 RX ORDER — OLOPATADINE HYDROCHLORIDE 1 MG/ML
1 SOLUTION/ DROPS OPHTHALMIC
Qty: 0 | Refills: 0 | DISCHARGE

## 2020-11-17 RX ORDER — CHOLECALCIFEROL (VITAMIN D3) 125 MCG
1 CAPSULE ORAL
Qty: 0 | Refills: 0 | DISCHARGE

## 2020-11-17 RX ADMIN — Medication 2000 UNIT(S): at 12:29

## 2020-11-17 RX ADMIN — Medication 500 MILLIGRAM(S): at 12:29

## 2020-11-17 RX ADMIN — CEFTRIAXONE 100 MILLIGRAM(S): 500 INJECTION, POWDER, FOR SOLUTION INTRAMUSCULAR; INTRAVENOUS at 06:27

## 2020-11-17 NOTE — CONSULT NOTE ADULT - SUBJECTIVE AND OBJECTIVE BOX
HPI:   Patient is a 70y female with a past history of colon cancer s/p partial colectomy and reversal, recurrent "UTI's" who was admitted via ER for presumptive UTI.  She has a long history of bladder infections, more frequent over past few years.She typically c/o frequency, bladder pressure, and nocturia.in the past she has been on macrobid suppression at a 100 mg daily dose with some success.She reports the lower doses do not work.  She has made frequent ER visits the past month, has received IV agents as well as cefpodoxime.Her last 3 urine cultures have been negative.She has a small stone, location not known.No history of pyelo or fever.She was started on CTX yesterday in ER, is feeling better today,Her urologist wishes to complete 3 days of CTX and discharge on macrobid.She has had close urology f/u in past, her last urologist has retired.She also c/o more frequent BM's, all developing post colon surgery, at times exacerbated by antibiotics,She has periodic vaginal yeast infections, also has been told to try topical estrogens but has not complied with this.    REVIEW OF SYSTEMS:  All other review of systems negative (Comprehensive ROS)    PAST MEDICAL & SURGICAL HISTORY:  Colorectal cancer    No significant past surgical history        Allergies    sulfa drugs (Pruritus)    Intolerances        Antimicrobials Day #  :day 2/3  cefTRIAXone   IVPB 1000 milliGRAM(s) IV Intermittent every 24 hours    Other Medications:  ascorbic acid 500 milliGRAM(s) Oral daily  cholecalciferol 2000 Unit(s) Oral daily  enoxaparin Injectable 40 milliGRAM(s) SubCutaneous daily  ketotifen 0.025% Ophthalmic Solution 1 Drop(s) Both EYES two times a day PRN  phenazopyridine 100 milliGRAM(s) Oral every 8 hours PRN      FAMILY HISTORY:  No pertinent family history in first degree relatives        SOCIAL HISTORY:  Smoking:  x   ETOH: x    Drug Use: x    T(F): 98.2 (20 @ 12:37), Max: 98.5 (20 @ 22:45)  HR: 75 (20 @ 12:37)  BP: 132/57 (20 @ 12:37)  RR: 18 (20 @ 12:37)  SpO2: 99% (11-17-20 @ 12:37)  Wt(kg): --    PHYSICAL EXAM:  General: alert, no acute distress  Eyes:  anicteric, no conjunctival injection, no discharge  Oropharynx: no lesions or injection 	  Neck: supple, without adenopathy  Lungs: clear to auscultation  Heart: regular rate and rhythm; no murmur, rubs or gallops  Abdomen: soft, nondistended, nontender, without mass or organomegaly  Skin: no lesions  Extremities: no clubbing, cyanosis, or edema  Neurologic: alert, oriented, moves all extremities    LAB RESULTS:                        12.4   5.47  )-----------( 213      ( 2020 07:00 )             40.3     -    139  |  102  |  13  ----------------------------<  94  3.9   |  27  |  0.73    Ca    9.7      2020 07:00  Phos  3.0       Mg     2.2         TPro  6.9  /  Alb  4.1  /  TBili  0.4  /  DBili  x   /  AST  22  /  ALT  22  /  AlkPhos  78      LIVER FUNCTIONS - ( 2020 07:00 )  Alb: 4.1 g/dL / Pro: 6.9 g/dL / ALK PHOS: 78 u/L / ALT: 22 u/L / AST: 22 u/L / GGT: x           Urinalysis Basic - ( 2020 07:05 )    Color: DARK BROWN / Appearance: Lt TURBID / S.019 / pH: 6.0  Gluc: NEGATIVE / Ketone: NEGATIVE  / Bili: SMALL / Urobili: MODERATE   Blood: NEGATIVE / Protein: 20 / Nitrite: POSITIVE   Leuk Esterase: NEGATIVE / RBC: 0-2 / WBC 0-2   Sq Epi: OCC / Non Sq Epi: x / Bacteria: NEGATIVE        MICROBIOLOGY:  RECENT CULTURES:   @ 07:05 .Urine Clean Catch (Midstream)     <10,000 CFU/mL Normal Urogenital Maria Esther       @ 10:38 .Urine Clean Catch (Midstream)     <10,000 CFU/mL Normal Urogenital Maria Esther            RADIOLOGY REVIEWED:  rad

## 2020-11-17 NOTE — PROGRESS NOTE ADULT - ASSESSMENT
hp70F hx of recurrent UTIs, colorectal cancer s/p resection, colostomy reversal, who presents with dysuria, and increased urinary frequency despite outpatient PO abx, a/w UTI.     Problem/Plan - 1:  ·  Problem: UTI (urinary tract infection).  Plan: - p/w dysuria, increased urinary frequency, SIRS negative  - s/p Macrobid and cefpodoxime outpatient, last urine cultures 11/10 (allscripts) and 11/13 negative, prior cultures 10/25/2020 pansensitive E.Coli   - UA not overtly positive (+nitrites, otherwise neg LE, WBC, bacteria) in setting of recent antibiotics  - appreciate ID and urology input   complete three days of abx and fu as op ..d/c on macrobid 50 bid     Problem/Plan - 2:  ·  Problem: Need for prophylactic measure.  Plan: - lovenox.

## 2020-11-17 NOTE — CONSULT NOTE ADULT - SUBJECTIVE AND OBJECTIVE BOX
Patient is a 70y old  Female who presents with a chief complaint of     HPI:  70F hx of recurrent UTIs, colorectal cancer s/p resection, colostomy reversal, who presents with dysuria, and increased urinary frequency. Patient initially developed symptoms on 11/10, had UA done with outpatient urologist which was negative, and was started on Macrobid. She had minimal improvement with Macrobid, presented to ED , discharged with cefpodoxime and pyridium. She has been taking the medications as prescribed but was still experiencing dysuria and urinary frequency, prompting her to return to the ED today. Of note, patient says she has about 2-3 UTIs a year, last in October (pansensitive E.coli). She denies fevers, chills, cp, sob, abdominal pain, n/v/d.     ED vitals: Tm 98.7, HR 78, /89, RR 18, 98% on RA (2020 08:39)      PAST MEDICAL & SURGICAL HISTORY:  Colorectal cancer    No significant past surgical history        REVIEW OF SYSTEMS:    CONSTITUTIONAL: No weakness, fevers or chills  HEENT: No visual changes;  No vertigo or throat pain   ENDO: No sweating, no palpitations  NECK: No pain or stiffness  MUSCULOSKELETAL: No back pain, no joint pain  RESPIRATORY: No cough, wheezing, hemoptysis; No shortness of breath  CARDIOVASCULAR: No chest pain or palpitations  GASTROINTESTINAL: No abdominal or epigastric pain. No nausea, vomiting, or hematemesis; No diarrhea or constipation. No melena or hematochezia.  NEUROLOGICAL: No numbness or weakness  PSYCH: No depression, no mood changes  SKIN: No itching, burning, rashes, or lesions   All other review of systems is negative unless indicated above.    MEDICATIONS  (STANDING):  ascorbic acid 500 milliGRAM(s) Oral daily  cefTRIAXone   IVPB 1000 milliGRAM(s) IV Intermittent every 24 hours  cholecalciferol 2000 Unit(s) Oral daily  enoxaparin Injectable 40 milliGRAM(s) SubCutaneous daily    MEDICATIONS  (PRN):  ketotifen 0.025% Ophthalmic Solution 1 Drop(s) Both EYES two times a day PRN dry, itchy eyes  phenazopyridine 100 milliGRAM(s) Oral every 8 hours PRN buring, urinary discomfort      Allergies    sulfa drugs (Pruritus)    Intolerances        SOCIAL HISTORY: No illicit drug use    FAMILY HISTORY:  No pertinent family history in first degree relatives        Vital Signs Last 24 Hrs  T(C): 36.8 (2020 12:37), Max: 36.9 (2020 22:45)  T(F): 98.2 (2020 12:37), Max: 98.5 (2020 22:45)  HR: 75 (2020 12:37) (68 - 80)  BP: 132/57 (2020 12:37) (113/55 - 149/73)  BP(mean): --  RR: 18 (2020 12:37) (17 - 19)  SpO2: 99% (2020 12:37) (98% - 100%)    PHYSICAL EXAM:    Constitutional: NAD, well-developed  HEENT: PAULIE, EOMI, Normal Hearing, MMM  Neck: No JVD  Back: No CVA tenderness  Respiratory: No accessory respiratory muscle use  Abd: Soft, NT/ND    Extremities: No calf tenderness  Neurological: A/O x 3, no focal deficits  Psychiatric: Normal mood, normal affect  Skin: No rashes    I&O's Summary      LABS:                        12.4   5.47  )-----------( 213      ( 2020 07:00 )             40.3     11-17    139  |  102  |  13  ----------------------------<  94  3.9   |  27  |  0.73    Ca    9.7      2020 07:00  Phos  3.0     11-  Mg     2.2     -    TPro  6.9  /  Alb  4.1  /  TBili  0.4  /  DBili  x   /  AST  22  /  ALT  22  /  AlkPhos  78  11-17      Urinalysis Basic - ( 2020 07:05 )    Color: DARK BROWN / Appearance: Lt TURBID / S.019 / pH: 6.0  Gluc: NEGATIVE / Ketone: NEGATIVE  / Bili: SMALL / Urobili: MODERATE   Blood: NEGATIVE / Protein: 20 / Nitrite: POSITIVE   Leuk Esterase: NEGATIVE / RBC: 0-2 / WBC 0-2   Sq Epi: OCC / Non Sq Epi: x / Bacteria: NEGATIVE      Urine Culture: (-)    Plan discharge on Macrobid 50mg PO BID.  F/U to see Dr. Elaine .    Office: [phone]

## 2020-11-17 NOTE — CONSULT NOTE ADULT - ASSESSMENT
71 yo female with frequent symptoms suggestive of cystitis, with some positive response to suppressive   macrobid in the past.  She is without any signs of an upper tract infection-no fever, normal wbc, no flank pain.  As per urology, she has grown E Coli in the past, not clear why with all her symptoms her recent urine cultures have been negative.  Her UA is nitrite and Leuk esterase negative.  I am not sure what component of her symptoms are infection mediated and whether antibiotics will be the answer.  Suggest:  1. agree with limiting CTX to 3 days  2.Macrobid 50 BID as per urology upon discharge seems reasonable  3.Duration of macrobid per urology, advise urology f/u as OPD, I will be available for OPD ID f/u if necessary

## 2020-11-17 NOTE — PROGRESS NOTE ADULT - SUBJECTIVE AND OBJECTIVE BOX
Patient is a 70y old  Female who presents with a chief complaint of UTI (2020 17:28)                                                               INTERVAL HPI/OVERNIGHT EVENTS:    REVIEW OF SYSTEMS:     CONSTITUTIONAL: No weakness, fevers or chills  EYES/ENT: No visual changes , no ear ache   NECK: No pain or stiffness  RESPIRATORY: No cough, wheezing,  No shortness of breath  CARDIOVASCULAR: No chest pain or palpitations  GASTROINTESTINAL: No abdominal pain  . No nausea, vomiting, or hematemesis; No diarrhea or constipation. No melena or hematochezia.  GENITOURINARY:  dysuria, frequency or hematuria  NEUROLOGICAL: No numbness or weakness  SKIN: No itching, burning, rashes, or lesions                                                                                                                                                                                                                                                                                 Medications:  MEDICATIONS  (STANDING):  ascorbic acid 500 milliGRAM(s) Oral daily  cefTRIAXone   IVPB 1000 milliGRAM(s) IV Intermittent every 24 hours  cholecalciferol 2000 Unit(s) Oral daily  enoxaparin Injectable 40 milliGRAM(s) SubCutaneous daily    MEDICATIONS  (PRN):  ketotifen 0.025% Ophthalmic Solution 1 Drop(s) Both EYES two times a day PRN dry, itchy eyes  phenazopyridine 100 milliGRAM(s) Oral every 8 hours PRN buring, urinary discomfort       Allergies    sulfa drugs (Pruritus)    Intolerances      Vital Signs Last 24 Hrs  T(C): 36.8 (2020 22:03), Max: 36.9 (2020 22:45)  T(F): 98.2 (2020 22:03), Max: 98.5 (2020 22:45)  HR: 62 (2020 22:03) (62 - 75)  BP: 106/53 (2020 22:03) (106/53 - 134/55)  BP(mean): --  RR: 17 (2020 22:03) (17 - 19)  SpO2: 100% (2020 22:03) (98% - 100%)  CAPILLARY BLOOD GLUCOSE           @ 07:01  -   @ 07:00  --------------------------------------------------------  IN: 0 mL / OUT: 525 mL / NET: -525 mL     @ 07: @ 22:05  --------------------------------------------------------  IN: 0 mL / OUT: 1000 mL / NET: -1000 mL      Physical Exam:    Daily Height in cm: 154.94 (2020 22:45)    Daily Weight in k.1 (2020 22:45)  General:  Well appearing, NAD, not cachetic  HEENT:  Nonicteric, PERRLA  CV:  RRR, S1S2   Lungs:  CTA B/L, no wheezes, rales, rhonchi  Abdomen:  Soft, non-tender, no distended, positive BS  Extremities:  2+ pulses, no c/c, no edema  Skin:  Warm and dry, no rashes  :  No silva  Neuro:  AAOx3, non-focal, grossly intact                                                                                                                                                                                                                                                                                                LABS:                               12.4   5.47  )-----------( 213      ( 2020 07:00 )             40.3                          139  |  102  |  13  ----------------------------<  94  3.9   |  27  |  0.73    Ca    9.7      2020 07:00  Phos  3.0       Mg     2.2         TPro  6.9  /  Alb  4.1  /  TBili  0.4  /  DBili  x   /  AST  22  /  ALT  22  /  AlkPhos  78                         RADIOLOGY & ADDITIONAL TESTS         I personally reviewed: [  ]EKG   [  ]CXR    [  ] CT      A/P:         Discussed with :     Krysten consultants' Notes   Time spent :

## 2020-11-18 ENCOUNTER — TRANSCRIPTION ENCOUNTER (OUTPATIENT)
Age: 70
End: 2020-11-18

## 2020-11-18 VITALS
HEART RATE: 63 BPM | TEMPERATURE: 98 F | RESPIRATION RATE: 18 BRPM | OXYGEN SATURATION: 100 % | DIASTOLIC BLOOD PRESSURE: 54 MMHG | SYSTOLIC BLOOD PRESSURE: 114 MMHG

## 2020-11-18 LAB
ANION GAP SERPL CALC-SCNC: 9 MMO/L — SIGNIFICANT CHANGE UP (ref 7–14)
BUN SERPL-MCNC: 17 MG/DL — SIGNIFICANT CHANGE UP (ref 7–23)
CALCIUM SERPL-MCNC: 9.7 MG/DL — SIGNIFICANT CHANGE UP (ref 8.4–10.5)
CHLORIDE SERPL-SCNC: 104 MMOL/L — SIGNIFICANT CHANGE UP (ref 98–107)
CO2 SERPL-SCNC: 25 MMOL/L — SIGNIFICANT CHANGE UP (ref 22–31)
CREAT SERPL-MCNC: 0.73 MG/DL — SIGNIFICANT CHANGE UP (ref 0.5–1.3)
GLUCOSE SERPL-MCNC: 97 MG/DL — SIGNIFICANT CHANGE UP (ref 70–99)
HCT VFR BLD CALC: 39.9 % — SIGNIFICANT CHANGE UP (ref 34.5–45)
HCV AB S/CO SERPL IA: 0.08 S/CO — SIGNIFICANT CHANGE UP (ref 0–0.99)
HCV AB SERPL-IMP: SIGNIFICANT CHANGE UP
HGB BLD-MCNC: 12.5 G/DL — SIGNIFICANT CHANGE UP (ref 11.5–15.5)
MAGNESIUM SERPL-MCNC: 2.3 MG/DL — SIGNIFICANT CHANGE UP (ref 1.6–2.6)
MCHC RBC-ENTMCNC: 28.9 PG — SIGNIFICANT CHANGE UP (ref 27–34)
MCHC RBC-ENTMCNC: 31.3 % — LOW (ref 32–36)
MCV RBC AUTO: 92.1 FL — SIGNIFICANT CHANGE UP (ref 80–100)
NRBC # FLD: 0 K/UL — SIGNIFICANT CHANGE UP (ref 0–0)
PHOSPHATE SERPL-MCNC: 3.1 MG/DL — SIGNIFICANT CHANGE UP (ref 2.5–4.5)
PLATELET # BLD AUTO: 210 K/UL — SIGNIFICANT CHANGE UP (ref 150–400)
PMV BLD: 10.1 FL — SIGNIFICANT CHANGE UP (ref 7–13)
POTASSIUM SERPL-MCNC: 4.4 MMOL/L — SIGNIFICANT CHANGE UP (ref 3.5–5.3)
POTASSIUM SERPL-SCNC: 4.4 MMOL/L — SIGNIFICANT CHANGE UP (ref 3.5–5.3)
RBC # BLD: 4.33 M/UL — SIGNIFICANT CHANGE UP (ref 3.8–5.2)
RBC # FLD: 13.4 % — SIGNIFICANT CHANGE UP (ref 10.3–14.5)
SODIUM SERPL-SCNC: 138 MMOL/L — SIGNIFICANT CHANGE UP (ref 135–145)
WBC # BLD: 5.93 K/UL — SIGNIFICANT CHANGE UP (ref 3.8–10.5)
WBC # FLD AUTO: 5.93 K/UL — SIGNIFICANT CHANGE UP (ref 3.8–10.5)

## 2020-11-18 RX ORDER — NITROFURANTOIN MACROCRYSTAL 50 MG
1 CAPSULE ORAL
Qty: 14 | Refills: 0
Start: 2020-11-18 | End: 2020-11-24

## 2020-11-18 RX ORDER — PHENAZOPYRIDINE HCL 100 MG
1 TABLET ORAL
Qty: 0 | Refills: 0 | DISCHARGE
Start: 2020-11-18

## 2020-11-18 RX ORDER — FLUCONAZOLE 150 MG/1
1 TABLET ORAL
Qty: 1 | Refills: 0
Start: 2020-11-18 | End: 2020-11-18

## 2020-11-18 RX ORDER — INFLUENZA VIRUS VACCINE 15; 15; 15; 15 UG/.5ML; UG/.5ML; UG/.5ML; UG/.5ML
0.5 SUSPENSION INTRAMUSCULAR ONCE
Refills: 0 | Status: COMPLETED | OUTPATIENT
Start: 2020-11-18 | End: 2020-11-18

## 2020-11-18 RX ADMIN — CEFTRIAXONE 100 MILLIGRAM(S): 500 INJECTION, POWDER, FOR SOLUTION INTRAMUSCULAR; INTRAVENOUS at 06:24

## 2020-11-18 RX ADMIN — INFLUENZA VIRUS VACCINE 0.5 MILLILITER(S): 15; 15; 15; 15 SUSPENSION INTRAMUSCULAR at 13:27

## 2020-11-18 RX ADMIN — KETOTIFEN FUMARATE 1 DROP(S): 0.34 SOLUTION OPHTHALMIC at 06:25

## 2020-11-18 RX ADMIN — Medication 500 MILLIGRAM(S): at 12:31

## 2020-11-18 RX ADMIN — Medication 2000 UNIT(S): at 12:31

## 2020-11-18 NOTE — PROVIDER CONTACT NOTE (OTHER) - ASSESSMENT
Patient wants to speak to provider regarding antibiotics for UTI.  Patient has own home probiotic medication that she takes everyday.

## 2020-11-18 NOTE — DISCHARGE NOTE PROVIDER - CARE PROVIDER_API CALL
Fabricio Elaine)  Urology  [address] [facility], [address]  [place] [address]  Phone: [phone]  Fax: [phone]  Follow Up Time:

## 2020-11-18 NOTE — DISCHARGE NOTE PROVIDER - NSDCCPCAREPLAN_GEN_ALL_CORE_FT
PRINCIPAL DISCHARGE DIAGNOSIS  Diagnosis: Dysuria  Assessment and Plan of Treatment: You were started on antibiotics for a urinary infection. To help prevent future infections maintain healthy hygiene and avoid taking long baths. Wipe from front to back and empty your bladder frequently by keeping yourself properly hydrated. Monitor for signs/symptoms of infection, such as, fever/chills, burning/pain with urination, urinary frequency/hesitancy, cloudy urine, or blood in urine and follow-up with your primary care provider as outpatient for further care.   Follow up with Dr. Elaine on 11/23 at 1:45 pm for further medical management.  You will continueMacrobid 50 mg 2 times daily .

## 2020-11-18 NOTE — DISCHARGE NOTE PROVIDER - NSDCMRMEDTOKEN_GEN_ALL_CORE_FT
Diflucan 150 mg oral tablet: 1 tab(s) orally once  Macrodantin 50 mg oral capsule: 1 cap(s) orally 2 times a day   olopatadine 0.1% ophthalmic solution: 1 drop(s) to each affected eye 2 times a day  phenazopyridine 100 mg oral tablet: 1 tab(s) orally every 8 hours, As needed, buring, urinary discomfort  Vitamin C with Mary Hips 500 mg oral tablet: 1 tab(s) orally once a day  Vitamin D3 2000 intl units (50 mcg) oral tablet: 1 tab(s) orally once a day

## 2020-11-18 NOTE — DISCHARGE NOTE PROVIDER - HOSPITAL COURSE
70F hx of recurrent UTIs, colorectal cancer s/p resection, colostomy reversal, who presents with dysuria, and increased urinary frequency despite outpatient PO abx, a/w UTI.     ·  Problem: UTI (urinary tract infection).  Plan: - p/w dysuria, increased urinary frequency, SIRS negative  - s/p Macrobid and cefpodoxime outpatient, last urine cultures 11/10 (allscripts) and 11/13 negative, prior cultures 10/25/2020 pansensitive E.Coli   - UA not overtly positive (+nitrites, otherwise neg LE, WBC, bacteria) in setting of recent antibiotics  - appreciate ID and urology input -patient to follow up with Dr. ANALY Elaine as outpatient on Monday 11/23 at 1:45pm for further medical management.   complete three days of abx and fu as op ..d/c on macrobid 50 bid     Dispo- stable for discharge on 11/18 as per Dr. Alamo.  All medications reviewed prior to discharge.

## 2020-11-18 NOTE — PROGRESS NOTE ADULT - SUBJECTIVE AND OBJECTIVE BOX
Patient is a 70y old  Female who presents with a chief complaint of UTI (17 Nov 2020 17:28)                                                               INTERVAL HPI/OVERNIGHT EVENTS:    REVIEW OF SYSTEMS:     CONSTITUTIONAL: No weakness, fevers or chills  EYES/ENT: No visual changes , no ear ache   NECK: No pain or stiffness  RESPIRATORY: No cough, wheezing,  No shortness of breath  CARDIOVASCULAR: No chest pain or palpitations  GASTROINTESTINAL: No abdominal pain  . No nausea, vomiting, or hematemesis; No diarrhea or constipation. No melena or hematochezia.  GENITOURINARY: frequency overnight   NEUROLOGICAL: No numbness or weakness  SKIN: No itching, burning, rashes, or lesions                                                                                                                                                                                                                                                                                 Medications:  MEDICATIONS  (STANDING):  ascorbic acid 500 milliGRAM(s) Oral daily  cefTRIAXone   IVPB 1000 milliGRAM(s) IV Intermittent every 24 hours  cholecalciferol 2000 Unit(s) Oral daily  enoxaparin Injectable 40 milliGRAM(s) SubCutaneous daily    MEDICATIONS  (PRN):  ketotifen 0.025% Ophthalmic Solution 1 Drop(s) Both EYES two times a day PRN dry, itchy eyes  phenazopyridine 100 milliGRAM(s) Oral every 8 hours PRN buring, urinary discomfort       Allergies    sulfa drugs (Pruritus)    Intolerances      Vital Signs Last 24 Hrs  T(C): 36.8 (18 Nov 2020 06:19), Max: 36.8 (17 Nov 2020 22:03)  T(F): 98.2 (18 Nov 2020 06:19), Max: 98.2 (17 Nov 2020 22:03)  HR: 63 (18 Nov 2020 06:19) (62 - 63)  BP: 114/54 (18 Nov 2020 06:19) (106/53 - 114/54)  BP(mean): --  RR: 18 (18 Nov 2020 06:19) (17 - 18)  SpO2: 100% (18 Nov 2020 06:19) (100% - 100%)  CAPILLARY BLOOD GLUCOSE          11-17 @ 07:01  -  11-18 @ 07:00  --------------------------------------------------------  IN: 0 mL / OUT: 1800 mL / NET: -1800 mL      Physical Exam:    Daily     Daily   General:  Well appearing, NAD, not cachetic  HEENT:  Nonicteric, PERRLA  CV:  RRR, S1S2   Lungs:  CTA B/L, no wheezes, rales, rhonchi  Abdomen:  Soft, non-tender, no distended, positive BS  Extremities:  2+ pulses, no c/c, no edema  Skin:  Warm and dry, no rashes  :  No silva  Neuro:  AAOx3, non-focal, grossly intact                                                                                                                                                                                                                                                                                                LABS:                               12.5   5.93  )-----------( 210      ( 18 Nov 2020 06:40 )             39.9                      11-18    138  |  104  |  17  ----------------------------<  97  4.4   |  25  |  0.73    Ca    9.7      18 Nov 2020 06:40  Phos  3.1     11-18  Mg     2.3     11-18    TPro  6.9  /  Alb  4.1  /  TBili  0.4  /  DBili  x   /  AST  22  /  ALT  22  /  AlkPhos  78  11-17                       RADIOLOGY & ADDITIONAL TESTS         I personally reviewed: [  ]EKG   [  ]CXR    [  ] CT      A/P:         Discussed with :     Krysten consultants' Notes   Time spent :

## 2020-11-18 NOTE — PROVIDER CONTACT NOTE (OTHER) - SITUATION
Patient admitted for dysuria. Having urinary frequency. Was on an antibiotic before coming to hospital. Wants to clarify antibiotic with provider.

## 2020-11-18 NOTE — DISCHARGE NOTE NURSING/CASE MANAGEMENT/SOCIAL WORK - PATIENT PORTAL LINK FT
You can access the FollowMyHealth Patient Portal offered by [facility] by registering at the following website: [email or web address]. By joining [employer]’s FollowMyHealth portal, you will also be able to view your health information using other applications (apps) compatible with our system.

## 2020-11-18 NOTE — PROGRESS NOTE ADULT - ASSESSMENT
hp70F hx of recurrent UTIs, colorectal cancer s/p resection, colostomy reversal, who presents with dysuria, and increased urinary frequency despite outpatient PO abx, a/w UTI.     Problem/Plan - 1:  ·  Problem: UTI (urinary tract infection).  Plan: - p/w dysuria, increased urinary frequency, SIRS negative  - s/p Macrobid and cefpodoxime outpatient, last urine cultures 11/10 (allscripts) and 11/13 negative, prior cultures 10/25/2020 pansensitive E.Coli   - UA not overtly positive (+nitrites, otherwise neg LE, WBC, bacteria) in setting of recent antibiotics  - appreciate ID and urology input   completed three days of abx and fu as op ..d/c on macrobid 50 bid   - fu with urology as o/p..  would consider urodynamics ? defer to urology       Problem/Plan - 2:  ·  Problem: Need for prophylactic measure.  Plan: - lovenox.     discussed ACP : full code

## 2020-11-18 NOTE — PROVIDER CONTACT NOTE (OTHER) - SITUATION
Patient admitted for dysuria. Having urinary frequency. Patient has own home probiotic medication that she takes everyday.

## 2020-11-18 NOTE — PROVIDER CONTACT NOTE (OTHER) - ASSESSMENT
Patient wants to speak to provider regarding antibiotics for UTI. Wants to make follow up appt with urology. Provider made aware.

## 2022-01-10 NOTE — ED PROVIDER NOTE - OBJECTIVE STATEMENT
Alex SALES MD PGY2: 70 F PMH hx colorectal ca s/p resection and colostomy reversal 1 year ago and hx of dizziness and vertigo here for dizziness and vertigo today after waking up (did not wake her up from sleep) that is worse with head movement and ambulation assoc with nausea. Awoke this morning with those symptoms and took some meclizine 25mg from her prior episode last year after the nausea improved. Currently symptoms much better. No difficulty ambulating, able to sit without symptoms and ambulate without symptoms.     Urinary frequency intermittent x 2 weeks now improved s/p macrobid and urology and GYN visit for possible candida s/p treatment. Recurrent sxs today. Was told by urologist that she has an overactive bladder, and has meds for it. No back pain, fevers, nausea or vomiting now. No fevers. +headache/PAIN

## 2023-05-22 ENCOUNTER — EMERGENCY (EMERGENCY)
Facility: HOSPITAL | Age: 73
LOS: 1 days | Discharge: HOME | End: 2023-05-22
Attending: STUDENT IN AN ORGANIZED HEALTH CARE EDUCATION/TRAINING PROGRAM | Admitting: STUDENT IN AN ORGANIZED HEALTH CARE EDUCATION/TRAINING PROGRAM
Payer: MEDICARE

## 2023-05-22 VITALS
SYSTOLIC BLOOD PRESSURE: 144 MMHG | HEART RATE: 75 BPM | RESPIRATION RATE: 16 BRPM | TEMPERATURE: 98 F | DIASTOLIC BLOOD PRESSURE: 75 MMHG | OXYGEN SATURATION: 100 %

## 2023-05-22 VITALS
TEMPERATURE: 98 F | HEART RATE: 81 BPM | DIASTOLIC BLOOD PRESSURE: 77 MMHG | SYSTOLIC BLOOD PRESSURE: 156 MMHG | RESPIRATION RATE: 16 BRPM | OXYGEN SATURATION: 100 %

## 2023-05-22 LAB
ALBUMIN SERPL ELPH-MCNC: 3.4 G/DL — SIGNIFICANT CHANGE UP (ref 3.3–5)
ALP SERPL-CCNC: 103 U/L — SIGNIFICANT CHANGE UP (ref 40–120)
ALT FLD-CCNC: 22 U/L — SIGNIFICANT CHANGE UP (ref 4–33)
ANION GAP SERPL CALC-SCNC: 11 MMOL/L — SIGNIFICANT CHANGE UP (ref 7–14)
ANISOCYTOSIS BLD QL: SLIGHT — SIGNIFICANT CHANGE UP
AST SERPL-CCNC: 15 U/L — SIGNIFICANT CHANGE UP (ref 4–32)
BASOPHILS # BLD AUTO: 0.07 K/UL — SIGNIFICANT CHANGE UP (ref 0–0.2)
BASOPHILS NFR BLD AUTO: 0.9 % — SIGNIFICANT CHANGE UP (ref 0–2)
BILIRUB SERPL-MCNC: 0.4 MG/DL — SIGNIFICANT CHANGE UP (ref 0.2–1.2)
BUN SERPL-MCNC: 14 MG/DL — SIGNIFICANT CHANGE UP (ref 7–23)
CALCIUM SERPL-MCNC: 8.1 MG/DL — LOW (ref 8.4–10.5)
CHLORIDE SERPL-SCNC: 106 MMOL/L — SIGNIFICANT CHANGE UP (ref 98–107)
CK SERPL-CCNC: 15 U/L — LOW (ref 25–170)
CO2 SERPL-SCNC: 21 MMOL/L — LOW (ref 22–31)
CREAT SERPL-MCNC: 0.83 MG/DL — SIGNIFICANT CHANGE UP (ref 0.5–1.3)
EGFR: 75 ML/MIN/1.73M2 — SIGNIFICANT CHANGE UP
EOSINOPHIL # BLD AUTO: 0.12 K/UL — SIGNIFICANT CHANGE UP (ref 0–0.5)
EOSINOPHIL NFR BLD AUTO: 1.7 % — SIGNIFICANT CHANGE UP (ref 0–6)
GIANT PLATELETS BLD QL SMEAR: PRESENT — SIGNIFICANT CHANGE UP
GLUCOSE SERPL-MCNC: 111 MG/DL — HIGH (ref 70–99)
HCT VFR BLD CALC: 32.9 % — LOW (ref 34.5–45)
HGB BLD-MCNC: 10.5 G/DL — LOW (ref 11.5–15.5)
IANC: 5.18 K/UL — SIGNIFICANT CHANGE UP (ref 1.8–7.4)
LYMPHOCYTES # BLD AUTO: 0.82 K/UL — LOW (ref 1–3.3)
LYMPHOCYTES # BLD AUTO: 11.3 % — LOW (ref 13–44)
MCHC RBC-ENTMCNC: 28.2 PG — SIGNIFICANT CHANGE UP (ref 27–34)
MCHC RBC-ENTMCNC: 31.9 GM/DL — LOW (ref 32–36)
MCV RBC AUTO: 88.4 FL — SIGNIFICANT CHANGE UP (ref 80–100)
METAMYELOCYTES # FLD: 1.7 % — HIGH (ref 0–1)
MICROCYTES BLD QL: SLIGHT — SIGNIFICANT CHANGE UP
MONOCYTES # BLD AUTO: 0.63 K/UL — SIGNIFICANT CHANGE UP (ref 0–0.9)
MONOCYTES NFR BLD AUTO: 8.7 % — SIGNIFICANT CHANGE UP (ref 2–14)
NEUTROPHILS # BLD AUTO: 5.41 K/UL — SIGNIFICANT CHANGE UP (ref 1.8–7.4)
NEUTROPHILS NFR BLD AUTO: 66.1 % — SIGNIFICANT CHANGE UP (ref 43–77)
NEUTS BAND # BLD: 8.7 % — HIGH (ref 0–6)
NRBC # BLD: 1 /100 — HIGH (ref 0–0)
OVALOCYTES BLD QL SMEAR: SLIGHT — SIGNIFICANT CHANGE UP
PLAT MORPH BLD: ABNORMAL
PLATELET # BLD AUTO: 148 K/UL — LOW (ref 150–400)
PLATELET COUNT - ESTIMATE: NORMAL — SIGNIFICANT CHANGE UP
POIKILOCYTOSIS BLD QL AUTO: SLIGHT — SIGNIFICANT CHANGE UP
POTASSIUM SERPL-MCNC: 3.1 MMOL/L — LOW (ref 3.5–5.3)
POTASSIUM SERPL-SCNC: 3.1 MMOL/L — LOW (ref 3.5–5.3)
PROT SERPL-MCNC: 6.1 G/DL — SIGNIFICANT CHANGE UP (ref 6–8.3)
RBC # BLD: 3.72 M/UL — LOW (ref 3.8–5.2)
RBC # FLD: 13.2 % — SIGNIFICANT CHANGE UP (ref 10.3–14.5)
RBC BLD AUTO: ABNORMAL
SODIUM SERPL-SCNC: 138 MMOL/L — SIGNIFICANT CHANGE UP (ref 135–145)
TROPONIN T, HIGH SENSITIVITY RESULT: <6 NG/L — SIGNIFICANT CHANGE UP
VARIANT LYMPHS # BLD: 0.9 % — SIGNIFICANT CHANGE UP (ref 0–6)
WBC # BLD: 7.23 K/UL — SIGNIFICANT CHANGE UP (ref 3.8–10.5)
WBC # FLD AUTO: 7.23 K/UL — SIGNIFICANT CHANGE UP (ref 3.8–10.5)

## 2023-05-22 PROCEDURE — 99285 EMERGENCY DEPT VISIT HI MDM: CPT | Mod: GC

## 2023-05-22 PROCEDURE — 71046 X-RAY EXAM CHEST 2 VIEWS: CPT | Mod: 26

## 2023-05-22 PROCEDURE — 93010 ELECTROCARDIOGRAM REPORT: CPT

## 2023-05-22 RX ORDER — IBUPROFEN 200 MG
600 TABLET ORAL ONCE
Refills: 0 | Status: COMPLETED | OUTPATIENT
Start: 2023-05-22 | End: 2023-05-22

## 2023-05-22 RX ORDER — ACETAMINOPHEN 500 MG
975 TABLET ORAL ONCE
Refills: 0 | Status: COMPLETED | OUTPATIENT
Start: 2023-05-22 | End: 2023-05-22

## 2023-05-22 RX ORDER — POTASSIUM CHLORIDE 20 MEQ
40 PACKET (EA) ORAL ONCE
Refills: 0 | Status: COMPLETED | OUTPATIENT
Start: 2023-05-22 | End: 2023-05-22

## 2023-05-22 RX ADMIN — Medication 975 MILLIGRAM(S): at 10:42

## 2023-05-22 RX ADMIN — Medication 975 MILLIGRAM(S): at 11:35

## 2023-05-22 RX ADMIN — Medication 600 MILLIGRAM(S): at 11:23

## 2023-05-22 RX ADMIN — Medication 40 MILLIEQUIVALENT(S): at 13:47

## 2023-05-22 RX ADMIN — Medication 600 MILLIGRAM(S): at 12:04

## 2023-05-22 NOTE — ED ADULT NURSE NOTE - CHIEF COMPLAINT QUOTE
PT is taking Neulasta 5/16 on and starting Neulasta on 5/18 c/o pain to left hip, thigh and back pain.  This morning the pain traveled to chest.  PT is currently chemo through right chest wall port, last session 5/15.  No distress noted, breathing non-labored.  Denies dizziness, fevers, chills.  PMHX- Lungs CA, HLD.

## 2023-05-22 NOTE — CHART NOTE - NSCHARTNOTEFT_GEN_A_CORE
72 y/o F patient w/ Hx of rectal cancer who follows with [facility] p/w chest pain  Patient on treatment w/ bevacizumab +irinotecan +Neulasta On Pro last given on 5/15    If admin deemed necessary please admin to Dr Blanco who's aware of said patient    Full note to follow, awaiting patient information    thank you    Mmae Keene NP  Hematology/ Oncology  [facility]  [phone] (office)  [phone] (alt office)  Evenings and weekends please call MD on call or office

## 2023-05-22 NOTE — ED PROVIDER NOTE - PROGRESS NOTE DETAILS
Jewels Osman MD, PGY-3: labs negative, suspect side effect of meds, do not suspect ACS or PE, will dc.

## 2023-05-22 NOTE — ED PROVIDER NOTE - NSFOLLOWUPINSTRUCTIONS_ED_ALL_ED_FT
--take tylenol or motrin as needed for pain. Take tylenol 1000mg every 6 hours alternating with motrin 600 mg every 6 hours (take tylenol or motrin then three hours later take the other then three hours later take the first).  --today, the lab tests we did include basic blood tests, the imaging tests we did include chest xray. results significant for normal cardiac enzymes, no pneumonia. we have included these test results in your paperwork. please take them to your follow-up appointment  --given that you were in the ED today, we recommend a followup visit with your general doctor (primary care doctor) within7 days.   --your diagnosis is: chest pain  --return to the ED if pain changes in type or nature, if shortness of breath.

## 2023-05-22 NOTE — ED ADULT NURSE NOTE - NSFALLUNIVINTERV_ED_ALL_ED
Bed/Stretcher in lowest position, wheels locked, appropriate side rails in place/Call bell, personal items and telephone in reach/Instruct patient to call for assistance before getting out of bed/chair/stretcher/Non-slip footwear applied when patient is off stretcher/[place] to call system/Physically safe environment - no spills, clutter or unnecessary equipment/Purposeful proactive rounding/Room/bathroom lighting operational, light cord in reach

## 2023-05-22 NOTE — ED PROVIDER NOTE - OBJECTIVE STATEMENT
72YO F hx recurrent UTIs, colorectal cancer s/p resection, colostomy reversal, lung CA on chemo last chemo 7d prior, Neulasta (bone marrow stimulant) 4d prior, p/w diffuse body pain. located L hip/thigh/back x 4d, radiated to the middle chest x 1d. sharp, intermittent, not a/w sob. pain not pleuritic. pain worse on palpation. No prior history of DVT/PE, no recent travel/immobilization. denies   Fevers, cough, shortness of breath, abdominal pain, dysuria, diarrhea.  Patient at baseline has some nausea status post chemo.

## 2023-05-22 NOTE — ED PROVIDER NOTE - CLINICAL SUMMARY MEDICAL DECISION MAKING FREE TEXT BOX
Jewels Osman MD, PGY-3: 74YO F hx recurrent UTIs, colorectal cancer s/p resection, colostomy reversal, lung CA on chemo last chemo 7d prior, Neulasta (bone marrow stimulant) 4d prior, p/w diffuse body pain, now radiating to the chest. vss, pe sternal ttp.  suspect bone pain secondary to side effect from bone marrow  stimulant, consider ACS, rhabdo/myositis.  do not suspect PE given normal vital signs, pain not pleuritic. plan for basic labs, trop, pain control, likely dc.

## 2023-05-22 NOTE — ED ADULT NURSE NOTE - OBJECTIVE STATEMENT
Received pt in room 3, A&O x4, with past medical history of Lung Ca, HLD. Pt has a right chest wall port. Pt came to the ER due to chest pain. Pt is currently on Chemotherapy with last session 5/15/23, Pt was on Neulasta with started on 5/18/23 and then the pain started on the 3rd day of taking it, the pain was chest pain which radiated to left hip, thigh and back pain. Pt states right now the pain is like a muscle spasm. Pt denies any SOB, dizziness, headache, lightheadedness, weakness. Heart sounds are normal with normal sinus rhythm on the cardiac monitor. Lung sounds are normal, abdomen soft, nondistended, + bowel sounds in all four quadrants. No edema to upper or lower extremities, + pulses to upper or lower extremities. Safety maintained.

## 2023-05-22 NOTE — ED PROVIDER NOTE - ATTENDING CONTRIBUTION TO CARE
I have personally performed a face to face medical and diagnostic evaluation of the patient. I have discussed with and reviewed the Resident's note and agree with the History, ROS, Physical Exam and MDM unless otherwise indicated. A brief summary of my personal evaluation and impression can be found below.    Mallika SNOWDEN: 73-year-old female, history of colorectal cancer with resection, lung CA on chemo, recently started Neulasta, a bone marrow stimulant, she started this last week, she is here today with diffuse body pain particularly that started in the bilateral hips now moving through the spine to the lower skull as well as to the substernal chest area that has progressively gotten worse prompting her ED visit.  She she was told she may have achy bone pains in the setting of her new bone marrow stimulant, she got concerned because she started to develop chest pain.  No fever no nausea no vomiting no new lower extremity swelling or edema, no swelling or edema, no prior history of DVT or PE, she has no shortness of breath no dyspnea on exertion.    All other ROS negative, except as above and as per HPI and ROS section.    VITALS: Initial triage and subsequent vitals have been reviewed by me.  GEN APPEARANCE: Alert, non-toxic, well-appearing, NAD.  HEAD: Atraumatic.  EYES: PERRLa, EOMI, vision grossly intact.   NECK: Supple  CV: RRR, S1S2, no c/r/m/g. Cap refill <2 seconds. No bruits.   LUNGS: CTAB. No abnormal breath sounds.  ABDOMEN: Soft, NTND. No guarding or rebound.   MSK/EXT: No spinal or extremity point tenderness. No CVA ttp. Pelvis stable. No peripheral edema.  NEURO: Alert, follows commands. Weight bearing normal. Speech normal. Sensation and motor normal x4 extremities.   SKIN: Warm, dry and intact. No rash.  PSYCH: Appropriate    Plan/MDM: Exam vitals stable nontoxic-appearing physical exam as above, patient is very well-appearing, given patient presentation and recently started Neulasta very low concern for ACS, suspect likely she is having bone marrow of stimulation causing her bone discomfort which is also developing in a pattern consistent with that with long bones, plan to check basic labs EKG cardiac enzymes will give meds as needed and reassess, patient follows at [facility], advised patient to follow-up with her [facility] physicians for continued plan of care as well as a pain control regimen, will offer Tylenol Motrin in the ED.  Patient is agreeable plan and strict precautions were discussed anticipate discharge.

## 2023-05-22 NOTE — ED PROVIDER NOTE - PATIENT PORTAL LINK FT
You can access the FollowMyHealth Patient Portal offered by [facility] by registering at the following website: [email or web address]. By joining [employer]’s FollowMyHealth portal, you will also be able to view your health information using other applications (apps) compatible with our system.

## 2023-05-22 NOTE — ED PROVIDER NOTE - PHYSICAL EXAMINATION
Gen: WDWN, NAD  HEENT: EOMI, no nasal discharge, mucous membranes moist  CV: RRR, 2+ radial pulses R  Chest:  reproducible sternal chest wall tenderness to palpation  Resp: no accessory muscle use, no increased work of breathing  GI: Abdomen soft non-distended, NTTP  MSK: No open wounds, no bruising, no LE edema  Neuro: A&Ox4, following commands, moving all four extremities spontaneously  Psych: appropriate mood

## 2023-05-22 NOTE — ED ADULT TRIAGE NOTE - CHIEF COMPLAINT QUOTE
PT is taking Neulasta 5/16 on and starting 5/18 c/o pain to left hip, thigh and back pain.  This morning the pain traveled to chest.  PT is currently chemo through right chest wall port, last session 5/15.  No distress noted, breathing non-labored.  Denies dizziness, fevers, chills.  PMHX- Lungs CA, HLD. PT is taking Neulasta 5/16 on and starting Neulasta on 5/18 c/o pain to left hip, thigh and back pain.  This morning the pain traveled to chest.  PT is currently chemo through right chest wall port, last session 5/15.  No distress noted, breathing non-labored.  Denies dizziness, fevers, chills.  PMHX- Lungs CA, HLD.

## 2023-05-23 NOTE — ED POST DISCHARGE NOTE - ADDITIONAL DOCUMENTATION
Patient returned call, asking about results.  Bandemia was noted on CBC.  Patient is otherwise asymptomatic at this time.  No fevers or chills.  Patient had a recent Neulasta shot prior to arrival to the ED which can explain the bandemia.  Patient has been following up with hematology/oncology at [facility] and has appointment every 2 weeks.  No infectious symptomatology.  Patient instructed follow-up with PMD and oncologist regarding further evaluation management.  Strict return precautions related patient.

## 2023-09-26 NOTE — ED PROVIDER NOTE - CPE EDP [facility] NORM
Quality 402: Tobacco Use And Help With Quitting Among Adolescents: Patient screened for tobacco and never smoked
Detail Level: Detailed
normal...
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
## 2024-03-29 ENCOUNTER — EMERGENCY (EMERGENCY)
Facility: HOSPITAL | Age: 74
LOS: 1 days | Discharge: HOME | End: 2024-03-29
Attending: EMERGENCY MEDICINE | Admitting: EMERGENCY MEDICINE
Payer: MEDICARE

## 2024-03-29 VITALS
HEART RATE: 89 BPM | DIASTOLIC BLOOD PRESSURE: 75 MMHG | RESPIRATION RATE: 18 BRPM | TEMPERATURE: 98 F | OXYGEN SATURATION: 99 % | SYSTOLIC BLOOD PRESSURE: 148 MMHG

## 2024-03-29 LAB
ALBUMIN SERPL ELPH-MCNC: 4 G/DL — SIGNIFICANT CHANGE UP (ref 3.3–5)
ALP SERPL-CCNC: 247 U/L — HIGH (ref 40–120)
ALT FLD-CCNC: 43 U/L — HIGH (ref 4–33)
ANION GAP SERPL CALC-SCNC: 12 MMOL/L — SIGNIFICANT CHANGE UP (ref 7–14)
APTT BLD: 29.5 SEC — SIGNIFICANT CHANGE UP (ref 24.5–35.6)
AST SERPL-CCNC: 27 U/L — SIGNIFICANT CHANGE UP (ref 4–32)
BASOPHILS # BLD AUTO: 0.04 K/UL — SIGNIFICANT CHANGE UP (ref 0–0.2)
BASOPHILS NFR BLD AUTO: 0.5 % — SIGNIFICANT CHANGE UP (ref 0–2)
BILIRUB SERPL-MCNC: 0.4 MG/DL — SIGNIFICANT CHANGE UP (ref 0.2–1.2)
BUN SERPL-MCNC: 12 MG/DL — SIGNIFICANT CHANGE UP (ref 7–23)
CALCIUM SERPL-MCNC: 9.5 MG/DL — SIGNIFICANT CHANGE UP (ref 8.4–10.5)
CHLORIDE SERPL-SCNC: 103 MMOL/L — SIGNIFICANT CHANGE UP (ref 98–107)
CO2 SERPL-SCNC: 23 MMOL/L — SIGNIFICANT CHANGE UP (ref 22–31)
CREAT SERPL-MCNC: 0.86 MG/DL — SIGNIFICANT CHANGE UP (ref 0.5–1.3)
EGFR: 72 ML/MIN/1.73M2 — SIGNIFICANT CHANGE UP
EOSINOPHIL # BLD AUTO: 0.02 K/UL — SIGNIFICANT CHANGE UP (ref 0–0.5)
EOSINOPHIL NFR BLD AUTO: 0.3 % — SIGNIFICANT CHANGE UP (ref 0–6)
GLUCOSE SERPL-MCNC: 89 MG/DL — SIGNIFICANT CHANGE UP (ref 70–99)
HCT VFR BLD CALC: 31.2 % — LOW (ref 34.5–45)
HGB BLD-MCNC: 9.9 G/DL — LOW (ref 11.5–15.5)
IANC: 6.13 K/UL — SIGNIFICANT CHANGE UP (ref 1.8–7.4)
IMM GRANULOCYTES NFR BLD AUTO: 0.5 % — SIGNIFICANT CHANGE UP (ref 0–0.9)
INR BLD: 1.16 RATIO — SIGNIFICANT CHANGE UP (ref 0.85–1.18)
LYMPHOCYTES # BLD AUTO: 0.64 K/UL — LOW (ref 1–3.3)
LYMPHOCYTES # BLD AUTO: 8.6 % — LOW (ref 13–44)
MAGNESIUM SERPL-MCNC: 2.2 MG/DL — SIGNIFICANT CHANGE UP (ref 1.6–2.6)
MCHC RBC-ENTMCNC: 28.7 PG — SIGNIFICANT CHANGE UP (ref 27–34)
MCHC RBC-ENTMCNC: 31.7 GM/DL — LOW (ref 32–36)
MCV RBC AUTO: 90.4 FL — SIGNIFICANT CHANGE UP (ref 80–100)
MONOCYTES # BLD AUTO: 0.58 K/UL — SIGNIFICANT CHANGE UP (ref 0–0.9)
MONOCYTES NFR BLD AUTO: 7.8 % — SIGNIFICANT CHANGE UP (ref 2–14)
NEUTROPHILS # BLD AUTO: 6.13 K/UL — SIGNIFICANT CHANGE UP (ref 1.8–7.4)
NEUTROPHILS NFR BLD AUTO: 82.3 % — HIGH (ref 43–77)
NRBC # BLD: 0 /100 WBCS — SIGNIFICANT CHANGE UP (ref 0–0)
NRBC # FLD: 0 K/UL — SIGNIFICANT CHANGE UP (ref 0–0)
PHOSPHATE SERPL-MCNC: 3.1 MG/DL — SIGNIFICANT CHANGE UP (ref 2.5–4.5)
PLATELET # BLD AUTO: 255 K/UL — SIGNIFICANT CHANGE UP (ref 150–400)
POTASSIUM SERPL-MCNC: 3.9 MMOL/L — SIGNIFICANT CHANGE UP (ref 3.5–5.3)
POTASSIUM SERPL-SCNC: 3.9 MMOL/L — SIGNIFICANT CHANGE UP (ref 3.5–5.3)
PROT SERPL-MCNC: 7.4 G/DL — SIGNIFICANT CHANGE UP (ref 6–8.3)
PROTHROM AB SERPL-ACNC: 13 SEC — SIGNIFICANT CHANGE UP (ref 9.5–13)
RBC # BLD: 3.45 M/UL — LOW (ref 3.8–5.2)
RBC # FLD: 16.1 % — HIGH (ref 10.3–14.5)
SODIUM SERPL-SCNC: 138 MMOL/L — SIGNIFICANT CHANGE UP (ref 135–145)
WBC # BLD: 7.45 K/UL — SIGNIFICANT CHANGE UP (ref 3.8–10.5)
WBC # FLD AUTO: 7.45 K/UL — SIGNIFICANT CHANGE UP (ref 3.8–10.5)

## 2024-03-29 PROCEDURE — 99285 EMERGENCY DEPT VISIT HI MDM: CPT

## 2024-03-29 PROCEDURE — 71045 X-RAY EXAM CHEST 1 VIEW: CPT | Mod: 26

## 2024-03-29 PROCEDURE — 74177 CT ABD & PELVIS W/CONTRAST: CPT | Mod: 26,MC

## 2024-03-29 RX ORDER — IOHEXOL 300 MG/ML
30 INJECTION, SOLUTION INTRAVENOUS ONCE
Refills: 0 | Status: COMPLETED | OUTPATIENT
Start: 2024-03-29 | End: 2024-03-29

## 2024-03-29 RX ORDER — LOPERAMIDE HCL 2 MG
2 TABLET ORAL ONCE
Refills: 0 | Status: COMPLETED | OUTPATIENT
Start: 2024-03-29 | End: 2024-03-29

## 2024-03-29 RX ORDER — SODIUM CHLORIDE 9 MG/ML
1000 INJECTION INTRAMUSCULAR; INTRAVENOUS; SUBCUTANEOUS ONCE
Refills: 0 | Status: COMPLETED | OUTPATIENT
Start: 2024-03-29 | End: 2024-03-29

## 2024-03-29 RX ORDER — CIPROFLOXACIN LACTATE 400MG/40ML
400 VIAL (ML) INTRAVENOUS ONCE
Refills: 0 | Status: COMPLETED | OUTPATIENT
Start: 2024-03-29 | End: 2024-03-29

## 2024-03-29 RX ORDER — NITROFURANTOIN MACROCRYSTAL 50 MG
1 CAPSULE ORAL
Qty: 14 | Refills: 0
Start: 2024-03-29 | End: 2024-04-04

## 2024-03-29 RX ADMIN — SODIUM CHLORIDE 1000 MILLILITER(S): 9 INJECTION INTRAMUSCULAR; INTRAVENOUS; SUBCUTANEOUS at 12:54

## 2024-03-29 RX ADMIN — IOHEXOL 30 MILLILITER(S): 300 INJECTION, SOLUTION INTRAVENOUS at 14:14

## 2024-03-29 RX ADMIN — Medication 2 MILLIGRAM(S): at 22:56

## 2024-03-29 NOTE — CONSULT NOTE ADULT - ASSESSMENT
74 year female with pmh colon cancer s/p colectomy with DLI (2017 now reversed at [facility]), c/b recurrence with pelvic tumor s/p radiation and chemotherapy with Dr. Alegria at [facility], presents to [facility] ED with brown/yellow vaginal discharge. Of note patient was informed by her outpatient GI doctor after exam that she had "some type of fistula" but never had this kind of discharge before. Labs with left shift no leukocytosis, pending CTAP with PO and IV contrast.    Plan:  - Patient is currently nonseptic and HDS, after discussion with Dr. Linder patient should either be transferred or follow up at [facility] with her primary surgeon and radiation oncologist  - CTAP w/ PO and IV contrast results should be given to patient for follow up  - please call colorectal surgery with any further questions    discussed with Dr. Linder     Surgery Team A  [id number]

## 2024-03-29 NOTE — ED PROVIDER NOTE - NSFOLLOWUPINSTRUCTIONS_ED_ALL_ED_FT
Follow up with Dr. Capellan, Col[facility] Surgery [facility]. Use the contact information provided above to make the appointment. Inform the people making the appointment that you were in the Emergency Department, this will expedite your appointment. Call the Emergency Department if you have difficulties getting your appointment.    Immediately return to the Emergency Department for any new or markedly worsening symptoms.     the prescription sent to your pharmacy within the next 12 hours. Take all new and previous medications as prescribed.

## 2024-03-29 NOTE — ED PROVIDER NOTE - ATTENDING CONTRIBUTION TO CARE
Pt was seen and evaluated by me. Pt is a 73 y/o female with PMHx of Colorectal cancer who presented to the ED for brownish discharge from vaginal area X 1 wk. Pt states after chemo having 2.5 wks ago having some itchiness to her vaginal area. Pt was prescribed fluconazole applicator and notes after use in the area had some clear and then brownish discharge. Pt denies any fever, chills, nausea, vomiting, SOB, chest pain, or abd pain. Pt notes she was previously told to not have any instrumentation down there by her OB/Gyn has previous instrumentation in the office has lead to bleeding requiring cauterization.   VITALS: Vitals have been reviewed.  GEN APPEARANCE: Alert and cooperative, non-toxic appearing and in NAD  HEAD: Atraumatic, normocephalic.   EYES: PERRL, EOMI.   EARS: Gross hearing intact.   NOSE: No nasal discharge.   THROAT: MMM. Oral cavity and pharynx normal.  CV: RRR, S1S2, no c/r/m/g. No cyanosis or pallor.   LUNGS: CTAB. No wheezing. No rales. No rhonchi. No diminished breath sounds.   ABDOMEN: Soft, NTND. No guarding or rebound.   MSK/EXT: Spine appears normal, no spine point tenderness.   PELVIS: Brownish discharge.  NEURO: Alert, follows commands. Speech normal. Sensation and motor normal x4 extremities.   SKIN: Normal color for race, warm, dry and intact. No evidence of rash.  73 y/o female with PMHx of Colorectal cancer who presented to the ED for brownish discharge from vaginal area X 1 wk.  Concern for Fistula, UTI, Vaginal bleeding  Labs, CT, IVF, OB/Gyn consult

## 2024-03-29 NOTE — ED ADULT NURSE NOTE - NSFALLHARMRISKINTERV_ED_ALL_ED

## 2024-03-29 NOTE — ED ADULT NURSE NOTE - OBJECTIVE STATEMENT
Pt received from triage on stretcher, A/Ox4 answers all questions appropriately. C/O vaginal discharge. States she has been applying topical cream per GYN. Pt denies chest pain and SOB during initial assessment, breathing is even and unlabored on room air. Abdomen is soft and non-distended, non tender to touch. Pt ambulates independently with steady gait, moves all four extremities on command, skin is intact. Pt resting comfortably at the bedside, no apparent acute visible distress noted on initial assessment. Vital signs stable, NKA to medications. PMHx Colon Cancer. Pending provider orders.

## 2024-03-29 NOTE — ED PROVIDER NOTE - OBJECTIVE STATEMENT
see mdm 73 y/o female with PMHx of Colorectal cancer who presented to the ED for brownish discharge from vaginal area X 1 wk. Pt states after chemo having 2.5 wks ago having some itchiness to her vaginal area. Pt was prescribed fluconazole applicator and notes after use in the area had some clear and then brownish discharge. Pt denies any fever, chills, nausea, vomiting, SOB, chest pain, or abd pain. Pt notes she was previously told to not have any instrumentation down there by her OB/Gyn has previous instrumentation in the office has lead to bleeding requiring cauterization.

## 2024-03-29 NOTE — ED PROVIDER NOTE - PROGRESS NOTE DETAILS
Laila Tyler MD (PGY1) Spoke to OB and they will come see the patient. OB suggests reaching out to surgery. Laila Tyler MD (PGY1) Surgery suggests getting a ct abdomen and pelvis w/ oral and iv contrast. Surgery will see the pt. Toby Mclaughlin MD (PGY-3) pt refusing transfer. asked surgery to come back down. Toby Mclaughlin MD (PGY-3) Surgery recommended follow-up in their clinic, Dr. Capellan,.  Patient comfortable with this idea.  Reporting dysuria, will send out on Macrobid given that UA and urine culture be confounded by stool in vaginal canal.  Clinically stable, will discharge at this time.  ED return precaution discussed.

## 2024-03-29 NOTE — ED ADULT NURSE NOTE - ED STAT RN HANDOFF DETAILS
Patient discharged by provider with instructions.  Pt port d/c by RN.  Patient leaving ED ny wheelchair with family.

## 2024-03-29 NOTE — ED PROVIDER NOTE - CARE PROVIDER_API CALL
Loki Linder  Colon/Rectal Surgery  [address], [address]  [place] [address]  Phone: [phone]  Fax: [phone]  Follow Up Time:

## 2024-03-29 NOTE — CONSULT NOTE ADULT - SUBJECTIVE AND OBJECTIVE BOX
Surgery Consult Note  Attending: La Gamma  Service: A team  p      HPI: 74y Female PMH colon cancer s/p resection (patient doesnt remember what kind of resection but possibly LAR) and DLI that [place] [facility], likely recurrence of cancer with pelvic tumor that was treated with radiation and chemotherapy by Dr. Alegria at [facility] with good response, patient at the time had declined any resection offered at [facility], subsequently develop pulmonary mets and continued chemotherapy, most recently treated with irinotecan 3 weeks ago. 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 Patient now has noticed around 1 week ago that she develop yellow/brown discharge from her vagina. this developed after she was self administered vaginal creams for yeast infection about 1 week ago. Denies fevers, chills, chest pain, dyspnea, headache, nausea, emesis.     In the ED patient HDS, labs with left shift but no elevation in wbc, pending CTAP with iv and po contrast. MRIs and CTs on disc from [facility] pending upload to pacs. Colorectal surgery consulted for possible fistula.       PAST MEDICAL HISTORY:  PAST MEDICAL & SURGICAL HISTORY:  Colorectal cancer      No significant past surgical history          ALLERGIES:  Allergies    sulfa drugs (Pruritus)    Intolerances        SOCIAL HISTORY: Negative for tobacco, etoh, or drug use    FAMILY HISTORY:  FAMILY HISTORY:  No pertinent family history in first degree relatives        PHYSICAL EXAM:  General: NAD, resting comfortably  HEENT: NC/AT, EOMI, normal hearing, no oral lesions, no LAD, neck supple  Pulmonary: normal resp effort  Cardiovascular: NSR, no murmurs  Abdominal: soft, ND/NT, no organomegaly, no guarding or rebound tenderness  LEONARD: scant yellow/green discharge around vaginal vault, patient declined SANTOSH  Extremities: WWP, normal strength, no clubbing/cyanosis/edema  Neuro: A/O x 3, CNs II-XII grossly intact, normal sensation, no focal deficits      VITAL SIGNS:  Vital Signs Last 24 Hrs  T(C): 36.7 (29 Mar 2024 12:15), Max: 36.9 (29 Mar 2024 09:11)  T(F): 98 (29 Mar 2024 12:15), Max: 98.5 (29 Mar 2024 09:11)  HR: 76 (29 Mar 2024 12:15) (76 - 89)  BP: 146/78 (29 Mar 2024 12:15) (146/78 - 148/75)  BP(mean): --  RR: 18 (29 Mar 2024 12:15) (18 - 18)  SpO2: 100% (29 Mar 2024 12:15) (99% - 100%)    Parameters below as of 29 Mar 2024 12:15  Patient On (Oxygen Delivery Method): room air        I&O's Summary      LABS:                        9.9    7.45  )-----------( 255      ( 29 Mar 2024 12:24 )             31.2     03-29    138  |  103  |  12  ----------------------------<  89  3.9   |  23  |  0.86    Ca    9.5      29 Mar 2024 12:24  Phos  3.1     03-29  Mg     2.20     03-29    TPro  7.4  /  Alb  4.0  /  TBili  0.4  /  DBili  x   /  AST  27  /  ALT  43<H>  /  AlkPhos  247<H>  03-29    PT/INR - ( 29 Mar 2024 12:24 )   PT: 13.0 sec;   INR: 1.16 ratio         PTT - ( 29 Mar 2024 12:24 )  PTT:29.5 sec  Urinalysis Basic - ( 29 Mar 2024 12:24 )    Color: x / Appearance: x / SG: x / pH: x  Gluc: 89 mg/dL / Ketone: x  / Bili: x / Urobili: x   Blood: x / Protein: x / Nitrite: x   Leuk Esterase: x / RBC: x / WBC x   Sq Epi: x / Non Sq Epi: x / Bacteria: x      CAPILLARY BLOOD GLUCOSE        LIVER FUNCTIONS - ( 29 Mar 2024 12:24 )  Alb: 4.0 g/dL / Pro: 7.4 g/dL / ALK PHOS: 247 U/L / ALT: 43 U/L / AST: 27 U/L / GGT: x             CULTURES:      RADIOLOGY & ADDITIONAL STUDIES:    CTAP

## 2024-03-29 NOTE — ED ADULT TRIAGE NOTE - CHIEF COMPLAINT QUOTE
Patient is on chemo and started to feel itchy in her vaginal area so she went to GYN. Was given some cream applicator but has noticed since then some brownish liquid coming from vaginal area.

## 2024-03-29 NOTE — ED ADULT NURSE REASSESSMENT NOTE - NS ED NURSE REASSESS COMMENT FT1
Pt provided barrier cream as well as a spray bottle to wash vaginal area with water as per her request.

## 2024-03-29 NOTE — ED PROVIDER NOTE - NSICDXFAMILYHX_GEN_ALL_CORE_FT
visible. No thyromegaly. RESPIRATORY:   Clear/ Equal breath sounds /No acute respiratory distress. No wheezes,rales, or rhonchi. No percussive abnormalities    HEART: Regular rhythm without murmur, rub or gallop. ABDOMEN:  overwt Soft, non tender. No masses, guarding or rebound. Normo active bowel sounds. EXTREMITIES:  No edema in any extremity. No cyanosis or clubbing. 2+ dorsalis pedis pulses bilaterally          Assessment & Plan    Diagnosis Orders   1. Anxiety  CBC    Comprehensive Metabolic Panel    TSH without Reflex    T4, Free   2. Migraine without status migrainosus, not intractable, unspecified migraine type  SUMAtriptan (IMITREX) 100 MG tablet     No orders of the defined types were placed in this encounter. No orders of the defined types were placed in this encounter. There are no discontinued medications. No Follow-up on file. Job stress  No hi/si  some issues w anxiety  Harika Khan is advised to follow up ASAP if condition deteriorates or problems arise and if no information on test results to patient in the next 1 month they are advised to call us.    Diet and exercise changes needed     Thong Noriega MD FAMILY HISTORY:  No pertinent family history in first degree relatives

## 2024-03-29 NOTE — CONSULT NOTE ADULT - SUBJECTIVE AND OBJECTIVE BOX
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 Patient is under care of [place]. Zoe Goldberg [place] [facility] [place] of her rectal cancer. She was initially diagnosed in 08/2017 via biopsy of a large rectal mass which pathology showed invasive adenocarcinoma. She received FOLFOX x 8 cycles starting 09/18/2017, then received concurrent chemoRT with infusional 5FU 01/17/2018-02/27/2018. s/p LAR 12/06/2018. She remained under surveillance until 02/04/2022 when CT showed increased size of soft tissue nodule abutting right piriformis, and biopsy revealed adenocarcinoma, moderately-differentiated, compatible with metastasis/recurrence of known colorectal primary. She began treatment with 5FU CIVI and RT 06/20/2022-07/13/2022 followed by close observation until CT on 04/14/2023 showed possible metastasis in lung along with rising CEA. She began treatment with irinotecan + bevacizumab 05/01/2023, then began new regimen 5FU/LV/emili 10/23/2023. Since 01/16/2024, she has been on only irinotecan, last dose 03/11/2024.   Patient seen this afternoon. She states that she was recently given vaginal insert medication for possible yeast infection, and after inserting treatment she began to notice brown discharge which continued to increase in quantity, which concerned her and prompted to her to go ER.    PAST MEDICAL & SURGICAL HISTORY:  Colorectal cancer      No significant past surgical history          FAMILY HISTORY:  No pertinent family history in first degree relatives        Alochol: Denied  Smoking: Nonsmoker  Drug Use: Denied  Marital Status:         Allergies    sulfa drugs (Pruritus)    Intolerances        MEDICATIONS  (STANDING):    MEDICATIONS  (PRN):      ROS  No fever, sweats, chills  No epistaxis, HA, sore throat  No CP, SOB, cough, sputum  No n/v/d, abd pain, melena, hematochezia  No edema  No rash  No anxiety  No back pain, joint pain  No bleeding, bruising  poss stool in vagina    T(C): 36.7 (03-29-24 @ 12:15), Max: 36.9 (03-29-24 @ 09:11)  HR: 76 (03-29-24 @ 12:15) (76 - 89)  BP: 146/78 (03-29-24 @ 12:15) (146/78 - 148/75)  RR: 18 (03-29-24 @ 12:15) (18 - 18)  SpO2: 100% (03-29-24 @ 12:15) (99% - 100%)  Wt(kg): --    PE  NAD  Awake, alert,  Anicteric, MMM  No c/c/e  No rash grossly  FROM                          9.9    7.45  )-----------( 255      ( 29 Mar 2024 12:24 )             31.2       03-29    138  |  103  |  12  ----------------------------<  89  3.9   |  23  |  0.86    Ca    9.5      29 Mar 2024 12:24  Phos  3.1     03-29  Mg     2.20     03-29    TPro  7.4  /  Alb  4.0  /  TBili  0.4  /  DBili  x   /  AST  27  /  ALT  43<H>  /  AlkPhos  247<H>  03-29

## 2024-03-29 NOTE — ED PROVIDER NOTE - CLINICAL SUMMARY MEDICAL DECISION MAKING FREE TEXT BOX
74-year-old female pmhx of colorectal cancer with mets to the lung status post colonic resection on chemo every 3 weeks next session on Monday 4/1/24, hyperlipidemia, comes to ED w/ brown vaginal discharge..  Patient reports that earlier this week they developed clear vaginal discharge and were seen by their OB/GYN doctor, they were prescribed fluconazole as well as a cream.  Patient applied cream which included a vaginal applicator.  Reports that since Wednesday after applying the cream vaginally patient started experiencing brownish vaginal discharge.  Due to persistence of symptoms patient decided to come to the emergency department.  Their pain/symptom is moderate, constant, non mediating with rest. Denies trauma, falls, fever, headache, LOC, Head Trauma, AMS, dizziness, syncope, shortness of breath, cough, rhinorrhea, congestion, chest pain, palpitations, abdominal pain, nausea, vomiting, diarrhea, constipation, melena, hematochezia, dysuria, hematuria, urinary frequency, flank pain, skin changes, p.o. issues, issues urinating, issues stooling, issues with ambulation, back pain.      General: non-toxic, NAD   HEENT: NCAT, PERRL   Cardiac: RRR, no murmurs, 2+ peripheral pulses   Chest: CTA   Abdomen: soft, non-distended, bowel sounds present, no ttp, no rebound or guarding   Pelvic: Visible thin brownish discharge from vagina.  Extremities: no peripheral edema, calf tenderness, or leg size discrepancies   Skin: no rashes   Neuro: AAOx4, 5+motor, sensory grossly intact   Psych: mood and affect appropriate    Impression: 74-year-old female pmhx of colorectal cancer with mets to the lung status post colonic resection on chemo every 3 weeks next session on Monday 4/1/24, hyperlipidemia, comes to ED w/ brown vaginal discharge. 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    Ordered labs, imaging, medications for diagnosis, management, and treatment.

## 2024-03-29 NOTE — ED ADULT NURSE REASSESSMENT NOTE - NS ED NURSE REASSESS COMMENT FT1
Break coverage RN: Received patient in room 9, pending CT results. Patient resting in stretcher, no distress noted. Patient denies any pain/discomfort at this time. Patient is A&OX4, airway patent, breathing unlabored and even. Side rails up and safety maintained. Call bells within reach.

## 2024-03-29 NOTE — CONSULT NOTE ADULT - SUBJECTIVE AND OBJECTIVE BOX
HPI: 74y P1 postmenopausal female w/ h/o recurrent UTIs, colorectal cancer s/p resection and colostomy reversal, chemo, and radiation presents with brown discharge per vagina. Patient notes this all started last week when she started a vaginal insert for yeast infection. After starting the inserts, she noted brown material that looked like feces. She went to her GI doc for evaluation and per pt, told by GI doctor last week after rectal exam that she has a colovaginal fistula. Of note, patient was told recently by OBGYN to not undergo speculum exams or transvaginal ultrasounds due to concern for bleeding after her last exam d/t "thin walls of vagina."        Name of GYN Physician: [facility]    OB:  x1    Gyn: Pt states no history     PAST MEDICAL & SURGICAL HISTORY:  Colorectal cancer (left side)  s/p ?LAR with ostomy, later diverted and s/p chemo, radiation for recurrence and now on chemo for metastasis to lungs     Allergies    sulfa drugs (Pruritus)    Intolerances      FAMILY HISTORY:  No pertinent family history in first degree relatives        Vital Signs Last 24 Hrs  T(C): 36.7 (29 Mar 2024 12:15), Max: 36.9 (29 Mar 2024 09:11)  T(F): 98 (29 Mar 2024 12:15), Max: 98.5 (29 Mar 2024 09:11)  HR: 76 (29 Mar 2024 12:15) (76 - 89)  BP: 146/78 (29 Mar 2024 12:15) (146/78 - 148/75)  BP(mean): --  RR: 18 (29 Mar 2024 12:15) (18 - 18)  SpO2: 100% (29 Mar 2024 12:15) (99% - 100%)    Parameters below as of 29 Mar 2024 12:15  Patient On (Oxygen Delivery Method): room air        PHYSICAL EXAM:  Constitutional: alert and oriented x 3  Respiratory: nl WOB on RA  Gastrointestinal: soft, non tender, no palpable masses  : vulvar appearance c/w post radiation changes, feculent matter noted on labia  *pt declining internal or speculum exam*        LABS:                        9.9    7.45  )-----------( 255      ( 29 Mar 2024 12:24 )             31.2     -    138  |  103  |  12  ----------------------------<  89  3.9   |  23  |  0.86    Ca    9.5      29 Mar 2024 12:24  Phos  3.1       Mg     2.20         TPro  7.4  /  Alb  4.0  /  TBili  0.4  /  DBili  x   /  AST  27  /  ALT  43<H>  /  AlkPhos  247<H>      PT/INR - ( 29 Mar 2024 12:24 )   PT: 13.0 sec;   INR: 1.16 ratio         PTT - ( 29 Mar 2024 12:24 )  PTT:29.5 sec  Urinalysis Basic - ( 29 Mar 2024 12:24 )    Color: x / Appearance: x / SG: x / pH: x  Gluc: 89 mg/dL / Ketone: x  / Bili: x / Urobili: x   Blood: x / Protein: x / Nitrite: x   Leuk Esterase: x / RBC: x / WBC x   Sq Epi: x / Non Sq Epi: x / Bacteria: x        RADIOLOGY & ADDITIONAL STUDIES:   HPI: 74y P1 postmenopausal female w/ h/o recurrent UTIs, colorectal cancer s/p resection and colostomy reversal, chemo, and radiation presents with brown discharge per vagina. Patient notes this all started last week when she started a vaginal insert for yeast infection. After starting the inserts, she noted brown material that looked like feces. She went to her GI doc for evaluation and per pt, told by GI doctor last week after rectal exam that she has a likely colovaginal vs colovesical fistula. Of note, patient was told recently by OBGYN to not undergo speculum exams or transvaginal ultrasounds due to concern for bleeding after her last exam d/t "thin walls of vagina."      Name of GYN Physician: [facility]    OB:  x1    Gyn: Pt states no history     PAST MEDICAL & SURGICAL HISTORY:  Colorectal cancer (left side)  s/p ?LAR with ostomy, later diverted and s/p chemo, radiation for recurrence and now on chemo for metastasis to lungs (Luis @ [facility] [place])    Allergies    sulfa drugs (Pruritus)    Intolerances      FAMILY HISTORY:  No pertinent family history in first degree relatives        Vital Signs Last 24 Hrs  T(C): 36.7 (29 Mar 2024 12:15), Max: 36.9 (29 Mar 2024 09:11)  T(F): 98 (29 Mar 2024 12:15), Max: 98.5 (29 Mar 2024 09:11)  HR: 76 (29 Mar 2024 12:15) (76 - 89)  BP: 146/78 (29 Mar 2024 12:15) (146/78 - 148/75)  BP(mean): --  RR: 18 (29 Mar 2024 12:15) (18 - 18)  SpO2: 100% (29 Mar 2024 12:15) (99% - 100%)    Parameters below as of 29 Mar 2024 12:15  Patient On (Oxygen Delivery Method): room air        PHYSICAL EXAM:  Constitutional: alert and oriented x 3  Respiratory: nl WOB on RA  Gastrointestinal: soft, non tender, no palpable masses  : vulvar appearance c/w post radiation changes, feculent matter noted on labia  *pt declining internal or speculum exam*        LABS:                        9.9    7.45  )-----------( 255      ( 29 Mar 2024 12:24 )             31.2         138  |  103  |  12  ----------------------------<  89  3.9   |  23  |  0.86    Ca    9.5      29 Mar 2024 12:24  Phos  3.1       Mg     2.20         TPro  7.4  /  Alb  4.0  /  TBili  0.4  /  DBili  x   /  AST  27  /  ALT  43<H>  /  AlkPhos  247<H>      PT/INR - ( 29 Mar 2024 12:24 )   PT: 13.0 sec;   INR: 1.16 ratio         PTT - ( 29 Mar 2024 12:24 )  PTT:29.5 sec  Urinalysis Basic - ( 29 Mar 2024 12:24 )    Color: x / Appearance: x / SG: x / pH: x  Gluc: 89 mg/dL / Ketone: x  / Bili: x / Urobili: x   Blood: x / Protein: x / Nitrite: x   Leuk Esterase: x / RBC: x / WBC x   Sq Epi: x / Non Sq Epi: x / Bacteria: x        RADIOLOGY & ADDITIONAL STUDIES:   HPI: 74y P1 postmenopausal female w/ h/o recurrent UTIs, colorectal cancer s/p resection and colostomy reversal, chemo, and radiation presents with brown discharge per vagina. Patient notes this all started last week when she started a vaginal insert for yeast infection. After starting the inserts, she noted brown material that looked like feces. She went to her GI doc for evaluation and per pt, told by GI doctor last week after rectal exam that she has a likely colovaginal vs colovesical fistula. Of note, patient was told recently by OBGYN to not undergo speculum exams or transvaginal ultrasounds due to concern for bleeding after her last exam d/t "thin walls of vagina."      Name of GYN Physician: Dr Nalini Braxton    OB:  x1    Gyn: Pt states no history     PAST MEDICAL & SURGICAL HISTORY:  Colorectal cancer (left side)  s/p ?LAR with ostomy, later diverted and s/p chemo, radiation for recurrence and now on chemo for metastasis to lungs (Luis @ [facility])    Allergies    sulfa drugs (Pruritus)    Intolerances      FAMILY HISTORY:  No pertinent family history in first degree relatives        Vital Signs Last 24 Hrs  T(C): 36.7 (29 Mar 2024 12:15), Max: 36.9 (29 Mar 2024 09:11)  T(F): 98 (29 Mar 2024 12:15), Max: 98.5 (29 Mar 2024 09:11)  HR: 76 (29 Mar 2024 12:15) (76 - 89)  BP: 146/78 (29 Mar 2024 12:15) (146/78 - 148/75)  BP(mean): --  RR: 18 (29 Mar 2024 12:15) (18 - 18)  SpO2: 100% (29 Mar 2024 12:15) (99% - 100%)    Parameters below as of 29 Mar 2024 12:15  Patient On (Oxygen Delivery Method): room air        PHYSICAL EXAM:  Constitutional: alert and oriented x 3  Respiratory: nl WOB on RA  Gastrointestinal: soft, non tender, no palpable masses  : vulvar appearance c/w post radiation changes, feculent matter noted on labia  *pt declining internal or speculum exam*        LABS:                        9.9    7.45  )-----------( 255      ( 29 Mar 2024 12:24 )             31.2         138  |  103  |  12  ----------------------------<  89  3.9   |  23  |  0.86    Ca    9.5      29 Mar 2024 12:24  Phos  3.1       Mg     2.20         TPro  7.4  /  Alb  4.0  /  TBili  0.4  /  DBili  x   /  AST  27  /  ALT  43<H>  /  AlkPhos  247<H>      PT/INR - ( 29 Mar 2024 12:24 )   PT: 13.0 sec;   INR: 1.16 ratio         PTT - ( 29 Mar 2024 12:24 )  PTT:29.5 sec  Urinalysis Basic - ( 29 Mar 2024 12:24 )    Color: x / Appearance: x / SG: x / pH: x  Gluc: 89 mg/dL / Ketone: x  / Bili: x / Urobili: x   Blood: x / Protein: x / Nitrite: x   Leuk Esterase: x / RBC: x / WBC x   Sq Epi: x / Non Sq Epi: x / Bacteria: x        RADIOLOGY & ADDITIONAL STUDIES:

## 2024-03-29 NOTE — CONSULT NOTE ADULT - ASSESSMENT
This is a 74 year old female with rectal cancer who presents with concern for rectovaginal fistula.    1. Rectal cancer   -- Currently on treatment with irinotecan, last dose 03/11/2024   -- No systemic treatment while admitted   -- Follow up with Dr. Zoe Goldberg at [facility] after discharge     2. Vaginal discharge   -- Pt c/o concern for stool outputting from vagina, poss rectovaginal fistula   -- Surgery consulted, appreciate recs. Pending CTAP  -- Gyn/onc consulted, appreciate recs.   -- Will touch base with [facility] re: transfer/follow up as recommended    Will continue to follow.    Shreya Aguiar PA-C  Hematology/Oncology  [facility]  [phone] (office)  [phone] (alt office)  Evenings and weekends please call MD on call or office

## 2024-03-29 NOTE — ED PROVIDER NOTE - PATIENT PORTAL LINK FT
You can access the FollowMyHealth Patient Portal offered by [facility] by registering at the following website: [email or web address]. By joining [employer]’s FollowMyHealth portal, you will also be able to view your health information using other applications (apps) compatible with our system.

## 2024-03-29 NOTE — CONSULT NOTE ADULT - ASSESSMENT
A/P:  74y P1 postmenopausal female w/ h/o recurrent UTIs, colorectal cancer s/p resection and colostomy reversal, chemo, and radiation presents with brown discharge per vagina and, per pt, known rectovaginal fistula. GYN consulted for pelvic exam. Pt declining at this time.     - Agree with CT with IV Ctx per ED  - Further recs pending d/w attending     Juli Sheridan, PGY-4 A/P:  74y P1 postmenopausal female w/ h/o recurrent UTIs, colorectal cancer s/p resection and colostomy reversal, chemo, and radiation presents with brown discharge per vagina and, per pt, known rectovaginal fistula. GYN consulted for pelvic exam. Pt declining at this time.     - Agree with CT with IV Ctx per ED  - No acute GYN intervention at this time, please re-contact if patient agrees to speculum exam     Juli Sheridan, PGY-4  d/w Dr Washburn

## 2024-03-29 NOTE — ED PROVIDER NOTE - CARE PROVIDERS DIRECT ADDRESSES
Jennifer Ortiz was admitted to 202 from ED via cart accompanied by  and RN.   Reason for hospitalization is new onset dizziness.   Upon arrival, patient is stable. Patient has history significant for   Past Medical History:   Diagnosis Date   • Arthritis    • Coronary arteriosclerosis 11/1/2021   • Fracture    • Gastroesophageal reflux disease    • RAD (reactive airway disease)    .  Patient oriented to bed, call light, room and unit.  Patient provided with the following educational materials upon admission:safety, advanced directives, infection control and pain.   Level of understanding patient verbalized understanding and significant other or family member verbalized understanding.   Admission orders received at this time.   Dr. Anderson of hospitalist service notified of patient arrival.   See Epic documentation for patient individualized nursing care plan.   [email or web address]

## 2024-03-30 VITALS
RESPIRATION RATE: 16 BRPM | TEMPERATURE: 98 F | OXYGEN SATURATION: 100 % | SYSTOLIC BLOOD PRESSURE: 164 MMHG | HEART RATE: 93 BPM | DIASTOLIC BLOOD PRESSURE: 70 MMHG

## 2024-03-30 RX ORDER — FLUCONAZOLE 150 MG/1
1 TABLET ORAL
Qty: 2 | Refills: 0
Start: 2024-03-30 | End: 2024-03-31

## 2024-03-30 RX ORDER — PHENAZOPYRIDINE HCL 100 MG
1 TABLET ORAL
Qty: 20 | Refills: 0
Start: 2024-03-30 | End: 2024-04-08

## 2024-03-30 RX ADMIN — Medication 200 MILLIGRAM(S): at 00:16

## 2024-03-30 RX ADMIN — Medication 2 MILLIGRAM(S): at 00:16

## 2024-03-30 RX ADMIN — Medication 300 UNIT(S): at 01:48

## 2024-03-30 NOTE — ED ADULT NURSE REASSESSMENT NOTE - NS ED NURSE REASSESS COMMENT FT1
Break RN: pt resting in stretcher, A&Ox 4, medicated as per EMAR, RR equal and unlabored, VS as noted in flowsheets, safety maintained, comfort provided, care plan ongoing at this time.

## 2024-05-15 NOTE — ED PROVIDER NOTE - CARDIOVASCULAR [+], MLM
CHEST PAIN
No
Topical Sulfur Applications Pregnancy And Lactation Text: This medication is Pregnancy Category C and has an unknown safety profile during pregnancy. It is unknown if this topical medication is excreted in breast milk.

## 2024-10-29 ENCOUNTER — INPATIENT (INPATIENT)
Facility: HOSPITAL | Age: 74
LOS: 0 days | Discharge: HOME | End: 2024-10-30
Attending: INTERNAL MEDICINE | Admitting: INTERNAL MEDICINE
Payer: MEDICARE

## 2024-10-29 VITALS
TEMPERATURE: 98 F | RESPIRATION RATE: 16 BRPM | HEART RATE: 88 BPM | SYSTOLIC BLOOD PRESSURE: 148 MMHG | WEIGHT: 119.93 LBS | DIASTOLIC BLOOD PRESSURE: 67 MMHG | OXYGEN SATURATION: 99 %

## 2024-10-29 DIAGNOSIS — J90 PLEURAL EFFUSION, NOT ELSEWHERE CLASSIFIED: ICD-10-CM

## 2024-10-29 DIAGNOSIS — Z86.711 PERSONAL HISTORY OF PULMONARY EMBOLISM: ICD-10-CM

## 2024-10-29 DIAGNOSIS — C19 MALIGNANT NEOPLASM OF RECTOSIGMOID JUNCTION: ICD-10-CM

## 2024-10-29 DIAGNOSIS — Z29.9 ENCOUNTER FOR PROPHYLACTIC MEASURES, UNSPECIFIED: ICD-10-CM

## 2024-10-29 LAB
ALBUMIN SERPL ELPH-MCNC: 3.7 G/DL — SIGNIFICANT CHANGE UP (ref 3.3–5)
ALP SERPL-CCNC: 196 U/L — HIGH (ref 40–120)
ALT FLD-CCNC: 22 U/L — SIGNIFICANT CHANGE UP (ref 4–33)
ANION GAP SERPL CALC-SCNC: 10 MMOL/L — SIGNIFICANT CHANGE UP (ref 7–14)
AST SERPL-CCNC: 20 U/L — SIGNIFICANT CHANGE UP (ref 4–32)
BASOPHILS # BLD AUTO: 0.04 K/UL — SIGNIFICANT CHANGE UP (ref 0–0.2)
BASOPHILS NFR BLD AUTO: 0.6 % — SIGNIFICANT CHANGE UP (ref 0–2)
BILIRUB SERPL-MCNC: 0.3 MG/DL — SIGNIFICANT CHANGE UP (ref 0.2–1.2)
BUN SERPL-MCNC: 17 MG/DL — SIGNIFICANT CHANGE UP (ref 7–23)
CALCIUM SERPL-MCNC: 9.3 MG/DL — SIGNIFICANT CHANGE UP (ref 8.4–10.5)
CHLORIDE SERPL-SCNC: 101 MMOL/L — SIGNIFICANT CHANGE UP (ref 98–107)
CO2 SERPL-SCNC: 26 MMOL/L — SIGNIFICANT CHANGE UP (ref 22–31)
CREAT SERPL-MCNC: 0.88 MG/DL — SIGNIFICANT CHANGE UP (ref 0.5–1.3)
EGFR: 70 ML/MIN/1.73M2 — SIGNIFICANT CHANGE UP
EOSINOPHIL # BLD AUTO: 0.12 K/UL — SIGNIFICANT CHANGE UP (ref 0–0.5)
EOSINOPHIL NFR BLD AUTO: 1.8 % — SIGNIFICANT CHANGE UP (ref 0–6)
GLUCOSE SERPL-MCNC: 95 MG/DL — SIGNIFICANT CHANGE UP (ref 70–99)
HCT VFR BLD CALC: 32 % — LOW (ref 34.5–45)
HGB BLD-MCNC: 9.9 G/DL — LOW (ref 11.5–15.5)
IANC: 5.33 K/UL — SIGNIFICANT CHANGE UP (ref 1.8–7.4)
IMM GRANULOCYTES NFR BLD AUTO: 0.6 % — SIGNIFICANT CHANGE UP (ref 0–0.9)
LYMPHOCYTES # BLD AUTO: 0.81 K/UL — LOW (ref 1–3.3)
LYMPHOCYTES # BLD AUTO: 11.8 % — LOW (ref 13–44)
MCHC RBC-ENTMCNC: 30.1 PG — SIGNIFICANT CHANGE UP (ref 27–34)
MCHC RBC-ENTMCNC: 30.9 G/DL — LOW (ref 32–36)
MCV RBC AUTO: 97.3 FL — SIGNIFICANT CHANGE UP (ref 80–100)
MONOCYTES # BLD AUTO: 0.5 K/UL — SIGNIFICANT CHANGE UP (ref 0–0.9)
MONOCYTES NFR BLD AUTO: 7.3 % — SIGNIFICANT CHANGE UP (ref 2–14)
NEUTROPHILS # BLD AUTO: 5.33 K/UL — SIGNIFICANT CHANGE UP (ref 1.8–7.4)
NEUTROPHILS NFR BLD AUTO: 77.9 % — HIGH (ref 43–77)
NRBC # BLD: 0 /100 WBCS — SIGNIFICANT CHANGE UP (ref 0–0)
NRBC # FLD: 0 K/UL — SIGNIFICANT CHANGE UP (ref 0–0)
PLATELET # BLD AUTO: 283 K/UL — SIGNIFICANT CHANGE UP (ref 150–400)
POTASSIUM SERPL-MCNC: 3.7 MMOL/L — SIGNIFICANT CHANGE UP (ref 3.5–5.3)
POTASSIUM SERPL-SCNC: 3.7 MMOL/L — SIGNIFICANT CHANGE UP (ref 3.5–5.3)
PROT SERPL-MCNC: 7 G/DL — SIGNIFICANT CHANGE UP (ref 6–8.3)
RBC # BLD: 3.29 M/UL — LOW (ref 3.8–5.2)
RBC # FLD: 15.5 % — HIGH (ref 10.3–14.5)
SODIUM SERPL-SCNC: 137 MMOL/L — SIGNIFICANT CHANGE UP (ref 135–145)
WBC # BLD: 6.84 K/UL — SIGNIFICANT CHANGE UP (ref 3.8–10.5)
WBC # FLD AUTO: 6.84 K/UL — SIGNIFICANT CHANGE UP (ref 3.8–10.5)

## 2024-10-29 PROCEDURE — 99285 EMERGENCY DEPT VISIT HI MDM: CPT | Mod: GC

## 2024-10-29 PROCEDURE — 99223 1ST HOSP IP/OBS HIGH 75: CPT

## 2024-10-29 RX ORDER — GABAPENTIN 300 MG/1
0 CAPSULE ORAL
Refills: 0 | DISCHARGE

## 2024-10-29 RX ORDER — PANTOPRAZOLE SODIUM 40 MG/1
40 TABLET, DELAYED RELEASE ORAL
Refills: 0 | Status: DISCONTINUED | OUTPATIENT
Start: 2024-10-29 | End: 2024-10-30

## 2024-10-29 RX ORDER — IBUPROFEN 200 MG
400 TABLET ORAL THREE TIMES A DAY
Refills: 0 | Status: DISCONTINUED | OUTPATIENT
Start: 2024-10-29 | End: 2024-10-30

## 2024-10-29 RX ORDER — ACETAMINOPHEN 500 MG
1000 TABLET ORAL ONCE
Refills: 0 | Status: COMPLETED | OUTPATIENT
Start: 2024-10-29 | End: 2024-10-29

## 2024-10-29 RX ORDER — GABAPENTIN 300 MG/1
100 CAPSULE ORAL THREE TIMES A DAY
Refills: 0 | Status: DISCONTINUED | OUTPATIENT
Start: 2024-10-29 | End: 2024-10-30

## 2024-10-29 RX ORDER — ACETAMINOPHEN 500 MG
650 TABLET ORAL EVERY 6 HOURS
Refills: 0 | Status: DISCONTINUED | OUTPATIENT
Start: 2024-10-30 | End: 2024-10-30

## 2024-10-29 RX ORDER — PANTOPRAZOLE SODIUM 40 MG/1
1 TABLET, DELAYED RELEASE ORAL
Refills: 0 | DISCHARGE

## 2024-10-29 RX ORDER — FAMOTIDINE 10 MG/ML
10 INJECTION INTRAVENOUS AT BEDTIME
Refills: 0 | Status: DISCONTINUED | OUTPATIENT
Start: 2024-10-29 | End: 2024-10-30

## 2024-10-29 RX ORDER — DIPHENHYDRAMINE HCL 12.5MG/5ML
25 ELIXIR ORAL ONCE
Refills: 0 | Status: COMPLETED | OUTPATIENT
Start: 2024-10-29 | End: 2024-10-29

## 2024-10-29 RX ORDER — FAMOTIDINE 10 MG/ML
1 INJECTION INTRAVENOUS
Refills: 0 | DISCHARGE

## 2024-10-29 RX ORDER — CHOLECALCIFEROL (VITAMIN D3) 625 MCG
2000 CAPSULE ORAL DAILY
Refills: 0 | Status: DISCONTINUED | OUTPATIENT
Start: 2024-10-29 | End: 2024-10-30

## 2024-10-29 RX ADMIN — Medication 25 MILLIGRAM(S): at 23:27

## 2024-10-29 RX ADMIN — Medication 1000 MILLIGRAM(S): at 23:00

## 2024-10-29 RX ADMIN — GABAPENTIN 100 MILLIGRAM(S): 300 CAPSULE ORAL at 23:27

## 2024-10-29 RX ADMIN — Medication 400 MILLIGRAM(S): at 22:43

## 2024-10-29 NOTE — ED PROVIDER NOTE - OBJECTIVE STATEMENT
74-year-old female history of rectal cancer with metastasis presenting to the emergency department from [facility] with right-sided pleural effusion.  Patient went to [facility] this morning due to increased cough.  They obtained a CT of the chest which revealed increased right pleural effusion with near right lung collapse.  She presented here for drainage of pleural effusion.  She denies fever, chest pain, shortness of breath.

## 2024-10-29 NOTE — H&P ADULT - TIME BILLING
I have spent a total of 85 minutes time spent to prepare to see the patient, obtaining and reviewing history, physical examination, explaining the diagnosis, prognosis and treatment plan with the patient/family/caregiver. I also have spent the time ordering studies and testing, interpreting results, medicine reconciliation, subspecialty consultation and documentation as above.

## 2024-10-29 NOTE — H&P ADULT - PROBLEM SELECTOR PLAN 1
Pt with recurrent large right sided pleural effusion, likely malignant. Pt not in any respiratory distress or hypoxic. Pt states she does not want a pleurx drain. Last lovenox dose was 7AM 10/29.   - pulm consult in AM for drainage

## 2024-10-29 NOTE — ED ADULT TRIAGE NOTE - CHIEF COMPLAINT QUOTE
Pt history of rectal CA, last chemo treatment was 10/21/2024, presents from [facility] for discomfort to chest, had CXR performed showing pleural effusion to rt lung, endorses mild shortness of breath, no headache/dizziness, afebrile.

## 2024-10-29 NOTE — H&P ADULT - PROBLEM SELECTOR PLAN 3
Pt on lovenox 60 mg QD. Repeat CTA chest shows no PE. Last lovenox dose was 7AM 10/29.  - will hold lovenox for now as pt pending procedure for now

## 2024-10-29 NOTE — ED PROVIDER NOTE - CLINICAL SUMMARY MEDICAL DECISION MAKING FREE TEXT BOX
74-year-old female history of rectal cancer with metastasis presenting to the emergency department from [facility] with large right-sided pleural effusion with almost complete right lung collapse.  She denies symptoms at this time.  Patient brought CT scan discs with her.  These will be uploaded.  Vitals stable, O2 stable on room air. Chart note endorses to admit to Dr. Alex. Will obtain basic labs and admit to Dr. Alex for pleural effusion.

## 2024-10-29 NOTE — H&P ADULT - NSHPREVIEWOFSYSTEMS_GEN_ALL_CORE
REVIEW OF SYSTEMS:    CONSTITUTIONAL: No weakness, fevers or chills  EYES/ENT: No visual changes;  No vertigo or throat pain   NECK: No pain or stiffness  RESPIRATORY: No cough, wheezing, hemoptysis; No shortness of breath  CARDIOVASCULAR: + right side chest pain. No palpitations  GASTROINTESTINAL: No abdominal or epigastric pain. No nausea, vomiting, or hematemesis; No diarrhea or constipation. No melena or hematochezia.  GENITOURINARY: No dysuria, frequency or hematuria  NEUROLOGICAL: No numbness or weakness  SKIN: No itching, rashes

## 2024-10-29 NOTE — ED PROVIDER NOTE - PHYSICAL EXAMINATION
GENERAL: Awake, alert, NAD  HEENT: NC/AT, moist mucous membranes,  LUNGS: Diminished R lung sounds, L clear to ausculation   CARDIAC: RRR, no m/r/g  NEURO: A&Ox3. Moving all extremities.  SKIN: Warm and dry.   PSYCH: Normal affect.

## 2024-10-29 NOTE — H&P ADULT - HISTORY OF PRESENT ILLNESS
Pt is a 73 y/o female with PMH of Rectal cancer with local pelvic recurrence, metastases to lungs /bones, s/p FOLFOX (10/21/24) initially presenting to OSH for right sided chest pain, subsequently found to have large right pleural effusion and transferred to [facility] for further management.       CTA chest PE protocol from 10/29/24 from OSH  IMPRESSION:  1.  No pulmonary embolus.  2.  Since October 3, 2024, increased large right pleural effusion and   right lung volume loss.  3.  Unchanged thoracic lymphadenopathy and multiple bilateral pulmonary   metastases.  4.  Increased soft tissue component of some osseous metastases.   Pt is a 73 y/o female with PMH of Rectal cancer with local pelvic recurrence, metastases to lungs /bones, s/p FOLFOX (10/21/24), PE and DVT on lovenox, initially presenting to OSH for right sided chest pain, subsequently found to have large right pleural effusion and transferred to [facility] for further management. Pt states she last had drainage a few months ago. She denies SOB but just complains of discomfort in her right chest and a chronic right cough. Pt states she last took her lovenox at 7AM on 10/29. She denies fever, chills, abd pain, n/v/d.     In ED, vitals T 98.2, HR 88, /67, RR 16, O2 sat 99% on RA  Labs significant for: Hb 9.9 at baseline    Imaging: CTA chest PE protocol from 10/29/24 from OSH       Pt is a 73 y/o female with PMH of Rectal cancer with local pelvic recurrence, metastases to lungs /bones, s/p FOLFOX (10/21/24), PE and DVT on lovenox, initially presenting to OSH for right sided chest pain, subsequently found to have large right pleural effusion and transferred to [facility] for further management. Pt states she last had drainage a few weeks ago. She denies SOB but just complains of discomfort in her right chest and a chronic right cough. Pt states she last took her lovenox at 7AM on 10/29. She denies fever, chills, abd pain, n/v/d.     In ED, vitals T 98.2, HR 88, /67, RR 16, O2 sat 99% on RA  Labs significant for: Hb 9.9 at baseline    Imaging: CTA chest PE protocol from 10/29/24 from OSH    IMPRESSION:  1.  No pulmonary embolus.  2.  Since October 3, 2024, increased large right pleural effusion and   right lung volume loss.  3.  Unchanged thoracic lymphadenopathy and multiple bilateral pulmonary   metastases.  4.  Increased soft tissue component of some osseous metastases.

## 2024-10-29 NOTE — ED ADULT NURSE REASSESSMENT NOTE - NS ED NURSE REASSESS COMMENT FT1
report received from MATTHEW ALMENDAREZ- pt refusing VS at this time- states she is "very frustrated from not being taken care of all day"- pt "does not understand why effusion could not be drained once she got here"- no s/sx resp distress on exam- ACP notified with pt concerns- pending provider exam- report given to 4N MATTHEW Allen

## 2024-10-29 NOTE — ED ADULT NURSE NOTE - OBJECTIVE STATEMENT
Pt. received nuris room 17. Pt. is A&Ox3 ambulatory with a cane at baseline. Pt. presented to the ED c/o midsternal chest pain. Pt. states went to urgent care this morning after catching herself opening a door and feeling like she pulled muscles in her right shoulder and chest. She states that urgent care told her she had fluid in her lungs and to come to the ED to get them drained. Pt. states having complicated medical hx including colorectal CA, blood clots in the lungs, abdominal stoma bag, bleeding fistula. States taking Heparin once daily for blood clots in lungs. Upon assessment, speech clear, face symmetrical. S1 S2 heard upon ascultation. respirations even and unlabored. Spontaneous movement of all four extremities. Pt. placed on cardiac monitor, NSR noted. Bed set in lowest position, safety maintained throughout.

## 2024-10-29 NOTE — H&P ADULT - NSHPLABSRESULTS_GEN_ALL_CORE
.  LABS:                         9.9    6.84  )-----------( 283      ( 29 Oct 2024 19:00 )             32.0     10-29    137  |  101  |  17  ----------------------------<  95  3.7   |  26  |  0.88    Ca    9.3      29 Oct 2024 19:00    TPro  7.0  /  Alb  3.7  /  TBili  0.3  /  DBili  x   /  AST  20  /  ALT  22  /  AlkPhos  196[H]  10-29      Urinalysis Basic - ( 29 Oct 2024 19:00 )    Color: x / Appearance: x / SG: x / pH: x  Gluc: 95 mg/dL / Ketone: x  / Bili: x / Urobili: x   Blood: x / Protein: x / Nitrite: x   Leuk Esterase: x / RBC: x / WBC x   Sq Epi: x / Non Sq Epi: x / Bacteria: x                RADIOLOGY, EKG & ADDITIONAL TESTS: Reviewed.

## 2024-10-29 NOTE — H&P ADULT - NSHPPHYSICALEXAM_GEN_ALL_CORE
VITAL SIGNS:  T(C): 36.6 (10-29-24 @ 17:44), Max: 36.8 (10-29-24 @ 15:36)  T(F): 97.8 (10-29-24 @ 17:44), Max: 98.2 (10-29-24 @ 15:36)  HR: 88 (10-29-24 @ 17:44) (81 - 88)  BP: 136/75 (10-29-24 @ 17:44) (136/75 - 158/83)  BP(mean): 92 (10-29-24 @ 17:44) (92 - 92)  RR: 21 (10-29-24 @ 17:44) (16 - 21)  SpO2: 100% (10-29-24 @ 17:44) (96% - 100%)  Wt(kg): --    PHYSICAL EXAM:    Constitutional: resting comfortably in bed; NAD  Head: NC/AT  Eyes: PERRL, EOMI, anicteric sclera  ENT: no nasal discharge; uvula midline, no oropharyngeal erythema or exudates; MMM  Neck: supple  Respiratory: decreased breath sounds on right; no W/R/R, no retractions  Cardiac: +S1/S2; RRR; no M/R/G  Gastrointestinal: abdomen soft, NT/ND; no rebound or guarding; +BSx4; ostomy site c/d/i  Back: spine midline, no bony tenderness  Extremities: WWP, no clubbing or cyanosis; no peripheral edema  Musculoskeletal: no joint swelling, tenderness or erythema  Vascular: distal pulses intact  Dermatologic: skin warm, dry and intact; no rashes  Lymphatic: no submandibular or cervical LAD  Neurologic: AAOx3; moves all 4 extremities  Psychiatric: affect and characteristics of appearance, verbalizations, behaviors are appropriate

## 2024-10-29 NOTE — ED ADULT NURSE NOTE - NSFALLHARMRISKINTERV_ED_ALL_ED
Home Assistance OOB with selected safe patient handling equipment if applicable/Assistance with ambulation/Communicate risk of Fall with Harm to all staff, patient, and family/Monitor gait and stability/Provide patient with walking aids/Provide visual cue: red socks, yellow wristband, yellow gown, etc/Reinforce activity limits and safety measures with patient and family/Bed in lowest position, wheels locked, appropriate side rails in place/Call bell, personal items and telephone in reach/Instruct patient to call for assistance before getting out of bed/chair/stretcher/Non-slip footwear applied when patient is off stretcher/[place] to call system/Physically safe environment - no spills, clutter or unnecessary equipment/Purposeful Proactive Rounding/Room/bathroom lighting operational, light cord in reach

## 2024-10-29 NOTE — ED ADULT NURSE NOTE - CHIEF COMPLAINT QUOTE
Pt history of rectal CA, last chemo treatment was 10/21/2024, presents from [facility] for discomfort to chest, had CXR performed showing pleural effusion to rt lung, endorses mild shortness of breath, no headache/dizziness, afebrile.

## 2024-10-29 NOTE — ED PROVIDER NOTE - ATTENDING CONTRIBUTION TO CARE
Agree with resident note  74-year-old female with history of rectal cancer with mets presents after CT performed at outside hospital showed increasing right-sided pleural effusion.  Patient had gone in for evaluation due to cough and some back pain.  Denies any shortness of breath.  Sent into ER for thoracentesis.  Physical exam  Well-appearing female in no respiratory distress  Vital signs stable, not hypoxic  Decreased breath sounds at right lower base  S1-S2 no murmurs rubs or gallops  Abdomen soft nontender nondistended  Impression  Increasing right pleural effusion likely malignant will admit for patient to get thoracentesis

## 2024-10-29 NOTE — H&P ADULT - ASSESSMENT
Pt is a 75 y/o female with PMH of Rectal cancer with local pelvic recurrence, metastases to lungs /bones, s/p FOLFOX (10/21/24) initially presenting to OSH for right sided chest pain, subsequently found to have large right pleural effusion and transferred to [facility] for further management.

## 2024-10-30 ENCOUNTER — RESULT REVIEW (OUTPATIENT)
Age: 74
End: 2024-10-30

## 2024-10-30 ENCOUNTER — TRANSCRIPTION ENCOUNTER (OUTPATIENT)
Age: 74
End: 2024-10-30

## 2024-10-30 VITALS
TEMPERATURE: 98 F | HEART RATE: 95 BPM | DIASTOLIC BLOOD PRESSURE: 90 MMHG | OXYGEN SATURATION: 97 % | SYSTOLIC BLOOD PRESSURE: 145 MMHG | RESPIRATION RATE: 18 BRPM

## 2024-10-30 LAB
ALBUMIN FLD-MCNC: 3 G/DL — SIGNIFICANT CHANGE UP
APTT BLD: 29.9 SEC — SIGNIFICANT CHANGE UP (ref 24.5–35.6)
B PERT IGG+IGM PNL SER: ABNORMAL
COLOR FLD: ABNORMAL
FLUID INTAKE SUBSTANCE CLASS: SIGNIFICANT CHANGE UP
FOLATE+VIT B12 SERBLD-IMP: 3 % — SIGNIFICANT CHANGE UP
GLUCOSE FLD-MCNC: 100 MG/DL — SIGNIFICANT CHANGE UP
GRAM STN FLD: SIGNIFICANT CHANGE UP
HCT VFR BLD CALC: 29.3 % — LOW (ref 34.5–45)
HGB BLD-MCNC: 9.5 G/DL — LOW (ref 11.5–15.5)
INR BLD: 1.04 RATIO — SIGNIFICANT CHANGE UP (ref 0.85–1.16)
LDH SERPL L TO P-CCNC: 147 U/L — SIGNIFICANT CHANGE UP
LYMPHOCYTES # FLD: 30 % — SIGNIFICANT CHANGE UP
MCHC RBC-ENTMCNC: 30.5 PG — SIGNIFICANT CHANGE UP (ref 27–34)
MCHC RBC-ENTMCNC: 32.4 G/DL — SIGNIFICANT CHANGE UP (ref 32–36)
MCV RBC AUTO: 94.2 FL — SIGNIFICANT CHANGE UP (ref 80–100)
MONOS+MACROS # FLD: 60 % — SIGNIFICANT CHANGE UP
MRSA PCR RESULT.: SIGNIFICANT CHANGE UP
NEUTROPHILS-BODY FLUID: 7 % — SIGNIFICANT CHANGE UP
NRBC # BLD: 0 /100 WBCS — SIGNIFICANT CHANGE UP (ref 0–0)
NRBC # FLD: 0 K/UL — SIGNIFICANT CHANGE UP (ref 0–0)
PH FLD: 8.1 — SIGNIFICANT CHANGE UP
PLATELET # BLD AUTO: 255 K/UL — SIGNIFICANT CHANGE UP (ref 150–400)
PROT FLD-MCNC: 4.6 G/DL — SIGNIFICANT CHANGE UP
PROTHROM AB SERPL-ACNC: 12.4 SEC — SIGNIFICANT CHANGE UP (ref 9.9–13.4)
RBC # BLD: 3.11 M/UL — LOW (ref 3.8–5.2)
RBC # FLD: 15.4 % — HIGH (ref 10.3–14.5)
RCV VOL RI: HIGH CELLS/UL (ref 0–5)
S AUREUS DNA NOSE QL NAA+PROBE: SIGNIFICANT CHANGE UP
SPECIMEN SOURCE: SIGNIFICANT CHANGE UP
TOTAL CELLS COUNTED, BODY FLUID: 100 CELLS — SIGNIFICANT CHANGE UP
TOTAL NUCLEATED CELL COUNT, BODY FLUID: 393 CELLS/UL — HIGH (ref 0–5)
TUBE TYPE: SIGNIFICANT CHANGE UP
WBC # BLD: 6.21 K/UL — SIGNIFICANT CHANGE UP (ref 3.8–10.5)
WBC # FLD AUTO: 6.21 K/UL — SIGNIFICANT CHANGE UP (ref 3.8–10.5)

## 2024-10-30 PROCEDURE — 32555 ASPIRATE PLEURA W/ IMAGING: CPT | Mod: RT

## 2024-10-30 PROCEDURE — 71045 X-RAY EXAM CHEST 1 VIEW: CPT | Mod: 26

## 2024-10-30 PROCEDURE — 88305 TISSUE EXAM BY PATHOLOGIST: CPT | Mod: 26

## 2024-10-30 PROCEDURE — 88112 CYTOPATH CELL ENHANCE TECH: CPT | Mod: 26

## 2024-10-30 PROCEDURE — 99231 SBSQ HOSP IP/OBS SF/LOW 25: CPT | Mod: 25

## 2024-10-30 RX ORDER — INFLUENZ VIR VAC TV P-SURF2003 15MCG/.5ML
0.5 SYRINGE (ML) INTRAMUSCULAR ONCE
Refills: 0 | Status: DISCONTINUED | OUTPATIENT
Start: 2024-10-30 | End: 2024-10-30

## 2024-10-30 RX ORDER — CHLORHEXIDINE GLUCONATE 40 MG/ML
1 SOLUTION TOPICAL DAILY
Refills: 0 | Status: DISCONTINUED | OUTPATIENT
Start: 2024-10-30 | End: 2024-10-30

## 2024-10-30 RX ORDER — ACETAMINOPHEN 500 MG
325 TABLET ORAL ONCE
Refills: 0 | Status: COMPLETED | OUTPATIENT
Start: 2024-10-30 | End: 2024-10-30

## 2024-10-30 RX ORDER — ACETAMINOPHEN 500 MG
1000 TABLET ORAL ONCE
Refills: 0 | Status: COMPLETED | OUTPATIENT
Start: 2024-10-30 | End: 2024-10-30

## 2024-10-30 RX ORDER — GUAIFENESIN 100 MG/5ML
100 LIQUID ORAL EVERY 6 HOURS
Refills: 0 | Status: DISCONTINUED | OUTPATIENT
Start: 2024-10-30 | End: 2024-10-30

## 2024-10-30 RX ORDER — LIDOCAINE HCL 60 MG/3 ML
20 SYRINGE (ML) INJECTION ONCE
Refills: 0 | Status: COMPLETED | OUTPATIENT
Start: 2024-10-30 | End: 2024-10-30

## 2024-10-30 RX ORDER — ACETAMINOPHEN 500 MG
2 TABLET ORAL
Qty: 0 | Refills: 0 | DISCHARGE
Start: 2024-10-30

## 2024-10-30 RX ORDER — CALCIUM CARBONATE 215(500)MG
1 TABLET,CHEWABLE ORAL EVERY 6 HOURS
Refills: 0 | Status: DISCONTINUED | OUTPATIENT
Start: 2024-10-30 | End: 2024-10-30

## 2024-10-30 RX ORDER — IBUPROFEN 200 MG
1 TABLET ORAL
Qty: 0 | Refills: 0 | DISCHARGE
Start: 2024-10-30

## 2024-10-30 RX ORDER — ENOXAPARIN SODIUM 40MG/0.4ML
60 SYRINGE (ML) SUBCUTANEOUS
Qty: 0 | Refills: 0 | DISCHARGE

## 2024-10-30 RX ADMIN — CHLORHEXIDINE GLUCONATE 1 APPLICATION(S): 40 SOLUTION TOPICAL at 12:13

## 2024-10-30 RX ADMIN — GABAPENTIN 100 MILLIGRAM(S): 300 CAPSULE ORAL at 06:34

## 2024-10-30 RX ADMIN — Medication 20 MILLILITER(S): at 12:04

## 2024-10-30 RX ADMIN — Medication 400 MILLIGRAM(S): at 05:42

## 2024-10-30 RX ADMIN — FAMOTIDINE 10 MILLIGRAM(S): 10 INJECTION INTRAVENOUS at 00:01

## 2024-10-30 RX ADMIN — Medication 650 MILLIGRAM(S): at 12:12

## 2024-10-30 RX ADMIN — Medication 650 MILLIGRAM(S): at 14:13

## 2024-10-30 RX ADMIN — Medication 2000 UNIT(S): at 14:27

## 2024-10-30 RX ADMIN — GUAIFENESIN 100 MILLIGRAM(S): 100 LIQUID ORAL at 12:18

## 2024-10-30 RX ADMIN — Medication 325 MILLIGRAM(S): at 13:51

## 2024-10-30 RX ADMIN — Medication 1 SPRAY(S): at 06:32

## 2024-10-30 RX ADMIN — PANTOPRAZOLE SODIUM 40 MILLIGRAM(S): 40 TABLET, DELAYED RELEASE ORAL at 08:04

## 2024-10-30 RX ADMIN — Medication 325 MILLIGRAM(S): at 14:13

## 2024-10-30 RX ADMIN — GUAIFENESIN 100 MILLIGRAM(S): 100 LIQUID ORAL at 06:30

## 2024-10-30 RX ADMIN — Medication 1000 MILLIGRAM(S): at 00:00

## 2024-10-30 RX ADMIN — GABAPENTIN 100 MILLIGRAM(S): 300 CAPSULE ORAL at 13:17

## 2024-10-30 NOTE — PROVIDER CONTACT NOTE (OTHER) - ACTION/TREATMENT ORDERED:
ACP made aware, no further ordered given at this moment. RN will tell Day RN to reeducation patient on importance of lab draws while admitted.

## 2024-10-30 NOTE — DISCHARGE NOTE NURSING/CASE MANAGEMENT/SOCIAL WORK - NSDCVIVACCINE_GEN_ALL_CORE_FT
influenza, injectable, quadrivalent, preservative free; 18-Nov-2020 13:27; Belle Santoyo (RN); [employer]; [id number] (Exp. Date: 30-Jun-2021); IntraMuscular; Deltoid Left.; 0.5 milliLiter(s); VIS (VIS Published: 15-Aug-2019, VIS Presented: 18-Nov-2020);

## 2024-10-30 NOTE — DISCHARGE NOTE NURSING/CASE MANAGEMENT/SOCIAL WORK - NSDCPEFALRISK_GEN_ALL_CORE
For information on Fall & Injury Prevention, visit: [email or web address] OR  [email or web address] OR  https://www.cdc.gov/steadi/patient.html

## 2024-10-30 NOTE — CHART NOTE - NSCHARTNOTEFT_GEN_A_CORE
pls admit to Dr Alex
Postprocedure CXR reviewed  case reviewed w Dr Sultana  no objection to discharge  pt has Dr Sultana's office information  advised to to make follow up appointment and consider pleurX catheter placement  d/w primary team    Ashanti JUSTIN [id number]

## 2024-10-30 NOTE — PATIENT PROFILE ADULT - FALL HARM RISK - HARM RISK INTERVENTIONS
Assistance with ambulation/Assistance OOB with selected safe patient handling equipment/Communicate Risk of Fall with Harm to all staff/Discuss with provider need for PT consult/Monitor gait and stability/Provide patient with walking aids - walker, cane, crutches/Reinforce activity limits and safety measures with patient and family/Review medications for side effects contributing to fall risk/Sit up slowly, dangle for a short time, stand at bedside before walking/Tailored Fall Risk Interventions/Toileting schedule using arm’s reach rule for commode and bathroom/Use of alarms - bed, chair and/or voice tab/Visual Cue: Yellow wristband and red socks/Bed in lowest position, wheels locked, appropriate side rails in place/Call bell, personal items and telephone in reach/Instruct patient to call for assistance before getting out of bed or chair/Non-slip footwear when patient is out of bed/[place] to call system/Physically safe environment - no spills, clutter or unnecessary equipment/Purposeful Proactive Rounding/Room/bathroom lighting operational, light cord in reach

## 2024-10-30 NOTE — PROVIDER CONTACT NOTE (OTHER) - BACKGROUND
Hx of rectal cancer with metastasis. Admitted for pleural effusion.
admitted for pleural effusion, Hx of rectal Ca.

## 2024-10-30 NOTE — PATIENT PROFILE ADULT - FUNCTIONAL ASSESSMENT - DAILY ACTIVITY 2.
[facility] PAN SOCIAL WORK NOTE     Patient ID: Karina is a 65 year old female.seen for: CHF, difficulty walking, transient cerebral ischemic attack.     Primary Care Giver: Eduardo Alan Phone number: [phone]  Address: [address] [place] [address]        ADVANCE DIRECTIVES:  Power of  Status:  Not Activated  Code Status:  not specified  Advanced Directive Forms: discussed     REASON FOR VISIT/CC: Supportive counseling/community resources     History of Present Illness      Coping status: good  Mental Status: good  Support System: good     Financial      Financial Status: adequate  Comments: States income is adequate to monthly budget     Goals     Patient's/Family Goals: Improve mobility to regain independence  Assessment: Writer called to follow-up with patient regarding progress with resolving insurance issue.    States that she has been making phone calls to resolve issue. Explained that her Medicare coverage will begin on 2/1/20.    Requests writer follow-up next week to inquire about status of insurance. Writer encouraged patient to schedule test as soon as insurance is active. Patient verbalized understanding.    Provided supportive counseling regarding to adjustment to care due to current medical status. Normalized emotions regarding loss of mobility/independence. Encouraged patient to practice positive self-talk.    Phone Visit: 1/28/20 3:30p-3:45p     Plan  Needs: Continue to follow to provide supportive counseling and ensure patient is compliant with medical advice from APN and RN.   Referral:  None, at this time.  :  Allyson Justice LCSW  Phone Number: [phone]      Allyson Justice LCSW   4 = No assist / stand by assistance

## 2024-10-30 NOTE — PROGRESS NOTE ADULT - SUBJECTIVE AND OBJECTIVE BOX
pt seen and examined at [facility]  consented for R thoracentesis   procedure completed at bedside  pt tolerated procedure well  urgent portable CXR ordered     Vital Signs Last 24 Hrs  T(C): 36.7 (30 Oct 2024 06:52), Max: 36.8 (29 Oct 2024 15:36)  T(F): 98 (30 Oct 2024 06:52), Max: 98.2 (29 Oct 2024 15:36)  HR: 84 (30 Oct 2024 06:52) (81 - 88)  BP: 131/65 (30 Oct 2024 06:52) (131/65 - 158/83)  BP(mean): 92 (29 Oct 2024 17:44) (92 - 92)  RR: 17 (30 Oct 2024 06:52) (16 - 21)  SpO2: 99% (30 Oct 2024 06:52) (96% - 100%)    Parameters below as of 30 Oct 2024 06:52  Patient On (Oxygen Delivery Method): room air    PHYSICAL EXAM:  Gen: NAD  Resp: Respirations unlabored. Bilateral chest rise.   Card: RRR.   GI: Soft. Nontender. Nondistended.   Skin: Warm, well perfused, no masses or organomegaly  EXT: No clubbing, cyanosis, or edema  R thoracentesis site c,d,i w occlusive dressing    urgent CXR ordered will follow    1250cc serosanguinous fluid aspirated  fluid sent for cytopathology, chemistries and micro    A/P  73 y/o female with PMH of Rectal cancer with local pelvic recurrence, metastases to lungs /bones, s/p FOLFOX (10/21/24), PE and DVT on lovenox, initially presenting to [facility] for right sided chest pain, subsequently found to have large right pleural effusion and transferred to [facility] for further management.  - s/p R thoracentesis in which 1250cc drained and fluid sent for analysis  - will f/u urgent CXR  - continue care per primary medical team    Ashanti JUSTIN [phone]

## 2024-10-30 NOTE — PHARMACOTHERAPY INTERVENTION NOTE - COMMENTS
Medication history update: Patient discharged before completion of medication history.     Home Medication List per [facility] "[employer] tab/MSK (10/29/24) verified with [facility]:  ·	prochlorperazine 10 mg oral tablet: 1 tab(s) orally every 6 hours, As Needed -Nausea (dispensed 10/21/24)  ·	ondansetron 8 mg oral tablet, disintegratin tab(s) orally every 8 hours, As Needed -Nausea (dispensed 10/21/24)  ·	gabapentin 100 mg oral capsule: take 1 cap(s) orally in the morning, 1 cap(s) in the afternoon and 2 cap(s) at bedtime x 30 days -Pain MDD:4 (dispensed 24)  ·	Pepcid 20 mg oral tablet: 1 tab(s) orally once a day (at bedtime) x 30 days (dispensed 10/21/24)  ·	Protonix 40 mg oral delayed release tablet: 1 tab(s) orally once a day x 30 days (dispensed 10/21/24)  ·	Enoxaparin: 60 milligram(s) subcutaneously 2 times a day (dispensed 9/6/24 x 60 days)    Home medications per [facility] tab/MSK (10/29/24) only:  ·	CeleBREX 50 mg oral capsule: 1-2 cap(s) orally 2 times a day x 30 days, As Needed -Moderate Pain  ·	traMADol 50 mg oral tablet: 1 tab(s) orally every 8 hours x 15 days, As Needed -Pain MDD:max dose 3 tabs  ·	sodium chloride nasal: 2 spray(s) nasal 2 times a day  ·	Analpram-HC 2.5%-1% rectal cream: 1 applicatorful rectally once a day, As Needed  ·	lidocaine-prilocaine 2.5%-2.5% topical cream: Apply topically to affected area every 6 hours x 30 days, As Needed  ·	acetaminophen 325 mg oral tablet: 3 tab(s) orally every 8 hours, As Needed  ·	TheraCran One oral capsule: 1 cap(s) orally once a day  ·	Vitamin D3 1000 intl units oral tablet: 2 tab(s) orally once a day  ·	biotin 5000 mcg oral tablet, disintegratin tab(s) orally once a day  ·	chlorpheniramine 4 mg oral tablet: 1 tab(s) orally once a day, As Needed  ·	Turkey Tail Mushroom: 2 cap(s) orally 2 times a day  ·	Colace 100 mg oral capsule: 1 cap(s) orally 2 times a day, As Needed    Please call spectra [phone] if you have any questions.       
Medication history is incomplete. Outpatient pharmacy currently closed, will follow up once reopens. Please call spectra [phone] if you have any questions.

## 2024-10-30 NOTE — DISCHARGE NOTE NURSING/CASE MANAGEMENT/SOCIAL WORK - FINANCIAL ASSISTANCE
[facility] provides services at a reduced cost to those who are determined to be eligible through [facility]’s financial assistance program. Information regarding [facility]’s financial assistance program can be found by going to [email or web address] or by calling [phone].

## 2024-10-30 NOTE — CONSULT NOTE ADULT - SUBJECTIVE AND OBJECTIVE BOX
HPI:  Pt is a 73 y/o female with PMH of Rectal cancer with local pelvic recurrence, metastases to lungs /bones, s/p FOLFOX (10/21/24), PE and DVT on lovenox, initially presenting to OSH for right sided chest pain, subsequently found to have large right pleural effusion and transferred to [facility] for further management. Pt states she last had drainage a few months ago. She denies SOB but just complains of discomfort in her right chest and a chronic right cough. Pt states she last took her lovenox at 7AM on 10/29. She denies fever, chills, abd pain, n/v/d.     In ED, vitals T 98.2, HR 88, /67, RR 16, O2 sat 99% on RA  Labs significant for: Hb 9.9 at baseline    Imaging: CTA chest PE protocol from 10/29/24 from OSH        (29 Oct 2024 22:07)  Patient found to have large right pleural effusion from outside CTA.  Not in any distress, on room air.  Able to converse in full sentence without any difficulty.     PAST MEDICAL & SURGICAL HISTORY:  Colorectal cancer      S/P colon resection          REVIEW OF SYSTEMS      General:No Weight change/ Fatigue/ HA/Dizzy	    Skin/Breast: No Rashes/ Lesions/ Masses  	  Ophthalmologic: No Blurry vision/ Glaucoma/ Blindness  	  ENMT: No Hearing loss/ Drainage/ Lesions	    Respiratory and Thorax: +Cough/ Wheezing/ shortness of breath  	  Cardiovascular: +Chest pain/ Palpitations/ Diaphoresis	    Gastrointestinal: No Nausea/ Vomiting/ Constipation/ Appetite Change	    Genitourinary: No Heamturia/ Dysuria/ Frequency change/ Impotence	    Musculoskeletal: +Pain/ Weakness	    Neurological: No Seizures/ TIA/CVA/ Parastesias	    Psychiatric: No Dementia/ Depression/ SI/HI	    Hematology/Lymphatics: No hx of bleeding/ Edema	    Endocrine:	No Hyperglycemia/ Hypoglycemia    Allergic/Immunologic:	 No Anaphylaxis/ Intolerance/ Recent illnesses    MEDICATIONS  (STANDING):  cholecalciferol 2000 Unit(s) Oral daily  diphenhydrAMINE 25 milliGRAM(s) Oral once  famotidine    Tablet 10 milliGRAM(s) Oral at bedtime  gabapentin 100 milliGRAM(s) Oral three times a day  pantoprazole    Tablet 40 milliGRAM(s) Oral before breakfast    MEDICATIONS  (PRN):  acetaminophen     Tablet .. 650 milliGRAM(s) Oral every 6 hours PRN Temp greater or equal to 38C (100.4F), Mild Pain (1 - 3)  benzonatate 100 milliGRAM(s) Oral three times a day PRN Cough  ibuprofen  Tablet. 400 milliGRAM(s) Oral three times a day PRN Moderate Pain (4 - 6)      Allergies    sulfa drugs (Pruritus)    Intolerances        SOCIAL HISTORY:  Lives Alone       Vital Signs Last 24 Hrs  T(C): 36.7 (29 Oct 2024 22:45), Max: 36.8 (29 Oct 2024 15:36)  T(F): 98 (29 Oct 2024 22:45), Max: 98.2 (29 Oct 2024 15:36)  HR: 85 (29 Oct 2024 22:45) (81 - 88)  BP: 134/66 (29 Oct 2024 22:45) (134/66 - 158/83)  BP(mean): 92 (29 Oct 2024 17:44) (92 - 92)  RR: 18 (29 Oct 2024 22:45) (16 - 21)  SpO2: 100% (29 Oct 2024 22:45) (96% - 100%)    Parameters below as of 29 Oct 2024 22:45  Patient On (Oxygen Delivery Method): room air        General: NAD  Neurology: Awake, nonfocal, ROMEO x 4  Eyes: Scleras clear, PERRLA/ EOMI, Gross vision intact  ENT:Gross hearing intact, grossly patent pharynx, no stridor  Neck: Neck supple, trachea midline, No JVD,   Respiratory: decreased on right side  CV: RRR, S1S2, no murmurs, rubs or gallops  Abdominal: Soft, NT, ND +BS,   Extremities: No edema, + peripheral pulses  Skin: No Rashes, Hematoma, Ecchymosis  Lymphatic: No Neck, axilla, groin LAD  Psych: Oriented x 3, normal affect    LABS:                        9.9    6.84  )-----------( 283      ( 29 Oct 2024 19:00 )             32.0     10-29    137  |  101  |  17  ----------------------------<  95  3.7   |  26  |  0.88    Ca    9.3      29 Oct 2024 19:00    TPro  7.0  /  Alb  3.7  /  TBili  0.3  /  DBili  x   /  AST  20  /  ALT  22  /  AlkPhos  196[H]  10-29      Urinalysis Basic - ( 29 Oct 2024 19:00 )    Color: x / Appearance: x / SG: x / pH: x  Gluc: 95 mg/dL / Ketone: x  / Bili: x / Urobili: x   Blood: x / Protein: x / Nitrite: x   Leuk Esterase: x / RBC: x / WBC x   Sq Epi: x / Non Sq Epi: x / Bacteria: x        RADIOLOGY & ADDITIONAL STUDIES:  CT: Large right pleural effusion    ASSESSMENT:   74yFemalePAST MEDICAL & SURGICAL HISTORY:  Colorectal cancer      S/P colon resection      HEALTH ISSUES - PROBLEM Dx:  Large pleural effusion    Colorectal cancer, stage IV    History of pulmonary embolism    Prophylactic measure          PLAN:  Will need to wait 24 hours since last dose of Lovenox before drainage (taken 10/29 at 7am)  Please continue to hold Lovenox  Plan for drainage in AM, patient wants only thoracentesis and then wants to be discharged home   Above discussed with Dr. Sultana    
Reason for consult: Rectal cancer    HPI:  Pt is a 73 y/o female with PMH of Rectal cancer with local pelvic recurrence, metastases to lungs /bones, s/p FOLFOX (10/21/24), PE and DVT on lovenox, initially presenting to OSH for right sided chest pain, subsequently found to have large right pleural effusion and transferred to [facility] for further management. Pt states she last had drainage a few months ago. She denies SOB but just complains of discomfort in her right chest and a chronic right cough. Pt states she last took her lovenox at 7AM on 10/29. She denies fever, chills, abd pain, n/v/d.     In ED, vitals T 98.2, HR 88, /67, RR 16, O2 sat 99% on RA  Labs significant for: Hb 9.9 at baseline    Imaging: CTA chest PE protocol from 10/29/24 from OSH    Oncology consultation completed for this  73 y/o female with PMH of Rectal cancer with local pelvic recurrence, metastases to lungs /bones, s/p FOLFOX (10/21/24), PE and DVT on lovenox, initially presenting to OSH for right sided chest pain, subsequently found to have large right pleural effusion  Patient undergoing care with Dr Zoe Goldberg of [facility]         (29 Oct 2024 22:07)      PAST MEDICAL & SURGICAL HISTORY:  Colorectal cancer      S/P colon resection          FAMILY HISTORY:      Alochol: Denied  Smoking: Nonsmoker  Drug Use: Denied  Marital Status:         Allergies    sulfa drugs (Pruritus)    Intolerances        MEDICATIONS  (STANDING):  chlorhexidine 2% Cloths 1 Application(s) Topical daily  cholecalciferol 2000 Unit(s) Oral daily  famotidine    Tablet 10 milliGRAM(s) Oral at bedtime  gabapentin 100 milliGRAM(s) Oral three times a day  influenza  Vaccine (HIGH DOSE) 0.5 milliLiter(s) IntraMuscular once  pantoprazole    Tablet 40 milliGRAM(s) Oral before breakfast    MEDICATIONS  (PRN):  acetaminophen     Tablet .. 650 milliGRAM(s) Oral every 6 hours PRN Temp greater or equal to 38C (100.4F), Mild Pain (1 - 3)  benzonatate 100 milliGRAM(s) Oral three times a day PRN Cough  ibuprofen  Tablet. 400 milliGRAM(s) Oral three times a day PRN Moderate Pain (4 - 6)      ROS  No fever, sweats, chills  No epistaxis, HA, sore throat  No CP, SOB, cough, sputum  No n/v/d, abd pain, melena, hematochezia  No edema  No rash  No anxiety  No back pain, joint pain  No bleeding, bruising  No dysuria, hematuria    T(C): 36.1 (10-29-24 @ 23:18), Max: 36.8 (10-29-24 @ 15:36)  HR: 84 (10-29-24 @ 23:18) (81 - 88)  BP: 136/87 (10-29-24 @ 23:18) (134/66 - 158/83)  RR: 18 (10-29-24 @ 23:18) (16 - 21)  SpO2: 99% (10-29-24 @ 23:18) (96% - 100%)  Wt(kg): --    PE  NAD  Awake, alert  Anicteric, MMM  RRR  CTAB  Abd soft, NT, ND  No c/c/e  No rash grossly  FROM                          9.9    6.84  )-----------( 283      ( 29 Oct 2024 19:00 )             32.0       10-29    137  |  101  |  17  ----------------------------<  95  3.7   |  26  |  0.88    Ca    9.3      29 Oct 2024 19:00    TPro  7.0  /  Alb  3.7  /  TBili  0.3  /  DBili  x   /  AST  20  /  ALT  22  /  AlkPhos  196[H]  10-29

## 2024-10-30 NOTE — PROVIDER CONTACT NOTE (OTHER) - SITUATION
pt refusing AM lab draw despite multiple attempt at reeducation. pt stated that she feels it is unnecessary to draw her labs again while being admitted.

## 2024-10-30 NOTE — CONSULT NOTE ADULT - ASSESSMENT
75 y/o female with history of Rectal cancer with local pelvic recurrence, metastases to lungs /bones, s/p FOLFOX (10/21/24), PE and DVT on lovenox, initially presenting to OSH for right sided chest pain    metastatic rectal cancer  -[place] [facility]  [place]osed in [place], treated with induction chemo followed by CRT then LAR with diverting ileostomy, developed local recurrence in right piriforms and treated with CRT in 2022, she then developed pulmonary mets in 2023  -treated with multiple lines of systemic therapy, most recently Folfiri  -follow with [facility] after discharge    Pleural effusion  -s/p thoracentesis 10/11 at [facility]  -650 cc's removed  -will follow up cytology  -CTA 10/29 shows increased large right pleural effusion, unchanged thoracic lymphadenopathy and multiple bilateral pulmonary mets with increased soft tissue component of osseous metastases      DVT  -LLE DVT on Lovenox since August 8/24  -on hold per CTS and plan for therapeutic Thoracentesis today  -recs appreciated    Danay Eaton NP  Hematology/Oncology  [facility]  [phone] (Office)  [phone] (Alt office)  Evenings and weekends please call MD on call or office    73 y/o female with history of Rectal cancer with local pelvic recurrence, metastases to lungs /bones, s/p FOLFOX (10/21/24), PE and DVT on lovenox, initially presenting to OSH for right sided chest pain    metastatic rectal cancer  -undergoing care with Dr Zoe Goldberg of [facility]  -diagnosed in 2017, treated with induction chemo followed by CRT then LAR with diverting ileostomy, developed local recurrence in right piriforms and treated with CRT in 2022, she then developed pulmonary mets in 2023  -treated with multiple lines of systemic therapy, most recently Folfiri LD 10/21  -follow with [facility] after discharge    Pleural effusion  -s/p thoracentesis 10/11 at [facility] [place]  -650 cc's removed  -will follow up cytology  -CTA 10/29 shows increased large right pleural effusion, unchanged thoracic lymphadenopathy and multiple bilateral pulmonary mets with increased soft tissue component of osseous metastases      DVT  -LLE DVT on Lovenox since August 8/24  -on hold per CTS and plan for therapeutic Thoracentesis today  -recs appreciated    Danay Eaton NP  Hematology/Oncology  [facility]  [phone] (Office)  [phone] (Alt office)  Evenings and weekends please call MD on call or office    75 y/o female with history of Rectal cancer with local pelvic recurrence, metastases to lungs /bones, s/p FOLFOX (10/21/24), PE and DVT on lovenox, initially presenting to OSH for right sided chest pain    metastatic rectal cancer  -undergoing [place] with [place] Zoe [place] [facility]  -diagnosed in 2017, treated with induction chemo followed by CRT then LAR with diverting ileostomy, developed local recurrence in right piriforms and treated with CRT in 2022, she then developed pulmonary mets in 2023  -treated with multiple lines of systemic therapy, most recently Folfiri LD 10/21  -follow with [facility] after discharge    Pleural effusion  -s/p thoracentesis 10/11 at [facility] [place]  -650 cc's removed  -will follow up cytology  -CTA 10/29 shows increased large right pleural effusion, unchanged thoracic lymphadenopathy and multiple bilateral pulmonary mets with increased soft tissue component of osseous metastases      DVT  -LLE DVT on Lovenox since August 8/24  -counts stable, resume per CTS  -on hold per CTS and plan for therapeutic Thoracentesis today  -recs appreciated    Danay Eaton NP  Hematology/Oncology  [facility]  [phone] (Office)  [phone] (Alt office)  Evenings and weekends please call MD on call or office

## 2024-10-30 NOTE — DISCHARGE NOTE PROVIDER - HOSPITAL COURSE
Pt is a 73 y/o female with PMH of Rectal cancer with local pelvic recurrence, metastases to lungs /bones, s/p FOLFOX (10/21/24), PE and DVT on lovenox, initially presenting to OSH for right sided chest pain, subsequently found to have large right pleural effusion and transferred to [facility] for further management. Pt states she last had drainage a few weeks ago. She denies SOB but just complains of discomfort in her right chest and a chronic right cough. Pt states she last took her lovenox at 7AM on 10/29. She denies fever, chills, abd pain, n/v/d. In ED, vitals T 98.2, HR 88, /67, RR 16, O2 sat 99% on RA. Labs significant for: Hb 9.9 at baseline. Imaging: CTA chest PE protocol from 10/29/24 from OSH showed no PE, increased large right pleural effusion.     Patient was admitted for large pleural effusion. CT surgery was consulted and thoracentesis was completed. Repeat CXR was stable and patient was cleared for discharge as per CT surgery. Patient to follow up outpatient with [facility].     On 10/30/24 this case was reviewed with Dr. Alex, the patient is medically stable and optimized for discharge. All medications were reviewed and prescriptions were sent to mutually agreed upon pharmacy.

## 2024-10-30 NOTE — DISCHARGE NOTE PROVIDER - CARE PROVIDER_API CALL
MONTANA MYERS  Thoracic Surgery  [address] [address] [place] [address], [address] [place] [address]  Phone: [phone]  Fax: [phone]  Follow Up Time:

## 2024-10-30 NOTE — PROVIDER CONTACT NOTE (OTHER) - ACTION/TREATMENT ORDERED:
ACP made aware. RN will try to reassess and reeducate in the AM. RN will relay information to Day Nurse. Pt educated on call bell system and to call RN when getting OOB.

## 2024-10-30 NOTE — PROVIDER CONTACT NOTE (OTHER) - ASSESSMENT
VSS. AOx4. pt is frustrated about being admitted overnight for pleural effusion. stated that she wanted fluid to be drained in ED, no on unit.
AO x4, OOB with cane. Pt is frustrated and stated she wants to go to sleep.

## 2024-10-30 NOTE — DISCHARGE NOTE NURSING/CASE MANAGEMENT/SOCIAL WORK - PATIENT PORTAL LINK FT
You can access the FollowMyHealth Patient Portal offered by [facility] by registering at the following website: [email or web address]. By joining [employer]’s FollowMyHealth portal, you will also be able to view your health information using other applications (apps) compatible with our system.

## 2024-10-30 NOTE — DISCHARGE NOTE PROVIDER - NSDCCPCAREPLAN_GEN_ALL_CORE_FT
PRINCIPAL DISCHARGE DIAGNOSIS  Diagnosis: Pleural effusion, right  Assessment and Plan of Treatment: You were admitted for large pleural effusion. Cardiothoracic surgery was consulted and thoracentesis was completed. Repeat chest x ray was stable and you were cleared for discharge as per Cardiothoracic surgery. Please follow up with your primary care physician, CT surgeon and hematologist/oncologist after discharge for continued monitoring and management.

## 2024-10-30 NOTE — DISCHARGE NOTE PROVIDER - NSDCMRMEDTOKEN_GEN_ALL_CORE_FT
acetaminophen 325 mg oral tablet: 2 tab(s) orally every 6 hours As needed Temp greater or equal to 38C (100.4F), Mild Pain (1 - 3)  enoxaparin 60 mg/0.6 mL injectable solution: 60 milligram(s) subcutaneously once a day Re-start tomorrow 10/31  famotidine 10 mg oral tablet: 1 tab(s) orally once a day (at bedtime)  gabapentin 100 mg oral tablet: orally 3 times a day  ibuprofen 400 mg oral tablet: 1 tab(s) orally 3 times a day As needed Moderate Pain (4 - 6)  pantoprazole 40 mg oral delayed release tablet: 1 tab(s) orally once a day (at bedtime)  Vitamin D3 2000 intl units (50 mcg) oral tablet: 1 tab(s) orally once a day

## 2024-10-30 NOTE — PROVIDER CONTACT NOTE (OTHER) - SITUATION
pt refusing RN skin assessment, vaginal fistula assessment, and bed alarm despite multiple attempts to educate pt by RN.

## 2024-11-03 LAB — NON-GYNECOLOGICAL CYTOLOGY STUDY: SIGNIFICANT CHANGE UP

## 2024-11-04 LAB
CULTURE RESULTS: SIGNIFICANT CHANGE UP
SPECIMEN SOURCE: SIGNIFICANT CHANGE UP

## 2025-01-17 ENCOUNTER — INPATIENT (INPATIENT)
Facility: HOSPITAL | Age: 75
LOS: 19 days | Discharge: HOME HEALTH | End: 2025-02-06
Attending: INTERNAL MEDICINE | Admitting: INTERNAL MEDICINE
Payer: MEDICARE

## 2025-01-17 VITALS
WEIGHT: 115.08 LBS | DIASTOLIC BLOOD PRESSURE: 79 MMHG | RESPIRATION RATE: 16 BRPM | OXYGEN SATURATION: 94 % | TEMPERATURE: 98 F | SYSTOLIC BLOOD PRESSURE: 142 MMHG | HEART RATE: 115 BPM

## 2025-01-17 LAB
ALBUMIN SERPL ELPH-MCNC: 3.3 G/DL — SIGNIFICANT CHANGE UP (ref 3.3–5)
ALP SERPL-CCNC: 370 U/L — HIGH (ref 40–120)
ALT FLD-CCNC: 18 U/L — SIGNIFICANT CHANGE UP (ref 4–33)
ANION GAP SERPL CALC-SCNC: 12 MMOL/L — SIGNIFICANT CHANGE UP (ref 7–14)
ANISOCYTOSIS BLD QL: SIGNIFICANT CHANGE UP
APTT BLD: 28.7 SEC — SIGNIFICANT CHANGE UP (ref 24.5–35.6)
AST SERPL-CCNC: 21 U/L — SIGNIFICANT CHANGE UP (ref 4–32)
BASOPHILS # BLD AUTO: 0 K/UL — SIGNIFICANT CHANGE UP (ref 0–0.2)
BASOPHILS NFR BLD AUTO: 0 % — SIGNIFICANT CHANGE UP (ref 0–2)
BILIRUB SERPL-MCNC: 0.3 MG/DL — SIGNIFICANT CHANGE UP (ref 0.2–1.2)
BUN SERPL-MCNC: 18 MG/DL — SIGNIFICANT CHANGE UP (ref 7–23)
CALCIUM SERPL-MCNC: 9.5 MG/DL — SIGNIFICANT CHANGE UP (ref 8.4–10.5)
CHLORIDE SERPL-SCNC: 100 MMOL/L — SIGNIFICANT CHANGE UP (ref 98–107)
CO2 SERPL-SCNC: 23 MMOL/L — SIGNIFICANT CHANGE UP (ref 22–31)
CREAT SERPL-MCNC: 0.6 MG/DL — SIGNIFICANT CHANGE UP (ref 0.5–1.3)
EGFR: 95 ML/MIN/1.73M2 — SIGNIFICANT CHANGE UP
EOSINOPHIL # BLD AUTO: 0.14 K/UL — SIGNIFICANT CHANGE UP (ref 0–0.5)
EOSINOPHIL NFR BLD AUTO: 1.7 % — SIGNIFICANT CHANGE UP (ref 0–6)
GIANT PLATELETS BLD QL SMEAR: PRESENT — SIGNIFICANT CHANGE UP
GLUCOSE SERPL-MCNC: 154 MG/DL — HIGH (ref 70–99)
HCT VFR BLD CALC: 35.8 % — SIGNIFICANT CHANGE UP (ref 34.5–45)
HGB BLD-MCNC: 10.6 G/DL — LOW (ref 11.5–15.5)
HYPOCHROMIA BLD QL: SLIGHT — SIGNIFICANT CHANGE UP
IANC: 7.03 K/UL — SIGNIFICANT CHANGE UP (ref 1.8–7.4)
INR BLD: 1.08 RATIO — SIGNIFICANT CHANGE UP (ref 0.85–1.16)
LYMPHOCYTES # BLD AUTO: 0 % — LOW (ref 13–44)
LYMPHOCYTES # BLD AUTO: 0 K/UL — LOW (ref 1–3.3)
MACROCYTES BLD QL: SIGNIFICANT CHANGE UP
MCHC RBC-ENTMCNC: 28.4 PG — SIGNIFICANT CHANGE UP (ref 27–34)
MCHC RBC-ENTMCNC: 29.6 G/DL — LOW (ref 32–36)
MCV RBC AUTO: 96 FL — SIGNIFICANT CHANGE UP (ref 80–100)
MONOCYTES # BLD AUTO: 0.07 K/UL — SIGNIFICANT CHANGE UP (ref 0–0.9)
MONOCYTES NFR BLD AUTO: 0.9 % — LOW (ref 2–14)
NEUTROPHILS # BLD AUTO: 7.62 K/UL — HIGH (ref 1.8–7.4)
NEUTROPHILS NFR BLD AUTO: 95.6 % — HIGH (ref 43–77)
PLAT MORPH BLD: NORMAL — SIGNIFICANT CHANGE UP
PLATELET # BLD AUTO: 271 K/UL — SIGNIFICANT CHANGE UP (ref 150–400)
PLATELET COUNT - ESTIMATE: NORMAL — SIGNIFICANT CHANGE UP
POLYCHROMASIA BLD QL SMEAR: SLIGHT — SIGNIFICANT CHANGE UP
POTASSIUM SERPL-MCNC: 3.6 MMOL/L — SIGNIFICANT CHANGE UP (ref 3.5–5.3)
POTASSIUM SERPL-SCNC: 3.6 MMOL/L — SIGNIFICANT CHANGE UP (ref 3.5–5.3)
PROT SERPL-MCNC: 7.4 G/DL — SIGNIFICANT CHANGE UP (ref 6–8.3)
PROTHROM AB SERPL-ACNC: 12.5 SEC — SIGNIFICANT CHANGE UP (ref 9.9–13.4)
RBC # BLD: 3.73 M/UL — LOW (ref 3.8–5.2)
RBC # FLD: 17.1 % — HIGH (ref 10.3–14.5)
RBC BLD AUTO: ABNORMAL
SODIUM SERPL-SCNC: 135 MMOL/L — SIGNIFICANT CHANGE UP (ref 135–145)
VARIANT LYMPHS # BLD: 1.8 % — SIGNIFICANT CHANGE UP (ref 0–6)
VARIANT LYMPHS NFR BLD MANUAL: 1.8 % — SIGNIFICANT CHANGE UP (ref 0–6)
WBC # BLD: 7.97 K/UL — SIGNIFICANT CHANGE UP (ref 3.8–10.5)
WBC # FLD AUTO: 7.97 K/UL — SIGNIFICANT CHANGE UP (ref 3.8–10.5)

## 2025-01-17 PROCEDURE — 71250 CT THORAX DX C-: CPT | Mod: 26

## 2025-01-17 PROCEDURE — 99285 EMERGENCY DEPT VISIT HI MDM: CPT | Mod: GC

## 2025-01-17 PROCEDURE — 71045 X-RAY EXAM CHEST 1 VIEW: CPT | Mod: 26

## 2025-01-17 NOTE — CONSULT NOTE ADULT - SUBJECTIVE AND OBJECTIVE BOX
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 Pt sent in from [facility] oncologist after CXR revealed moderate R effusion. CXR completed at [facility]. Pt admits increasing STEWART over the last few weeks.  Pt was admitted last October 2024 and had R thoracentesis. PT encourage to f/u w Dr Sultana in outpt office but did not make appointment. She also had a thoracentesis prior to that admission in October.          PAST MEDICAL & SURGICAL HISTORY:  Colorectal cancer      S/P colon resection          REVIEW OF SYSTEMS      General:No Weight change/ Fatigue/ HA/Dizzy	    Skin/Breast: No Rashes/ Lesions/ Masses  	  Ophthalmologic: No Blurry vision/ Glaucoma/ Blindness  	  ENMT: No Hearing loss/ Drainage/ Lesions	    Respiratory and Thorax: see HPI  	  Cardiovascular: No Chest pain/ Palpitations/ Diaphoresis	    Gastrointestinal: No Nausea/ Vomiting/ Constipation/ Appetite Change	    Genitourinary: No Heamturia/ Dysuria/ Frequency change/ Impotence	    Musculoskeletal: No Pain/ Weakness/ Claudication	    Neurological: No Seizures/ TIA/CVA/ Parastesias	    Psychiatric: No Dementia/ Depression/ SI/HI	    Hematology/Lymphatics: No hx of bleeding/ Edema	    Endocrine:	No Hyperglycemia/ Hypoglycemia    Allergic/Immunologic:	 No Anaphylaxis/ Intolerance/ Recent illnesses    MEDICATIONS  (STANDING):    MEDICATIONS  (PRN):      Allergies    sulfa drugs (Pruritus)    Intolerances        SOCIAL HISTORY:  no toxic habits  lives alone    FAMILY HISTORY:    unless noted, no significant family hx with Mother, Father, Siblings    Vital Signs Last 24 Hrs  T(C): 36.7 (17 Jan 2025 16:51), Max: 36.8 (17 Jan 2025 14:28)  T(F): 98.1 (17 Jan 2025 16:51), Max: 98.3 (17 Jan 2025 14:28)  HR: 105 (17 Jan 2025 16:51) (105 - 115)  BP: 152/71 (17 Jan 2025 16:51) (142/79 - 152/71)  BP(mean): --  RR: 17 (17 Jan 2025 16:51) (16 - 17)  SpO2: 100% (17 Jan 2025 16:51) (94% - 100%)  94% RA  100% on 2L nasal cannula       Parameters below as of 17 Jan 2025 16:51  Patient On (Oxygen Delivery Method): nasal cannula  O2 Flow (L/min): 4    PE  General: WN/WD NAD  Neurology: Awake, nonfocal, ROMEO x 4  Eyes: Scleras clear, PERRLA/ EOMI, Gross vision intact  ENT:Gross hearing intact, grossly patent pharynx, no stridor  Neck: Neck supple, trachea midline, No JVD,   Respiratory: decreased breath sounds R  100% on 2L nasal cannula  CV: RRR, S1S2, no murmurs, rubs or gallops  Abdominal: Soft, NT, ND +BS,   Extremities: No edema, + peripheral pulses  Skin: No Rashes, Hematoma, Ecchymosis  Lymphatic: No Neck, axilla, groin LAD  Psych: Oriented x 3, normal affect      LABS:                        10.6   7.97  )-----------( 271      ( 17 Jan 2025 16:12 )             35.8     01-17    135  |  100  |  18  ----------------------------<  154[H]  3.6   |  23  |  0.60    Ca    9.5      17 Jan 2025 16:12    TPro  7.4  /  Alb  3.3  /  TBili  0.3  /  DBili  x   /  AST  21  /  ALT  18  /  AlkPhos  370[H]  01-17    PT/INR - ( 17 Jan 2025 16:12 )   PT: 12.5 sec;   INR: 1.08 ratio         PTT - ( 17 Jan 2025 16:12 )  PTT:28.7 sec  Urinalysis Basic - ( 17 Jan 2025 16:12 )    Color: x / Appearance: x / SG: x / pH: x  Gluc: 154 mg/dL / Ketone: x  / Bili: x / Urobili: x   Blood: x / Protein: x / Nitrite: x   Leuk Esterase: x / RBC: x / WBC x   Sq Epi: x / Non Sq Epi: x / Bacteria: x        RADIOLOGY & ADDITIONAL STUDIES: reviewed    ASSESSMENT:   74y F w PMHx Rectal cancer with local pelvic recurrence, metastases to lungs /bone and recurrent R effusion       HEALTH ISSUES - R/O PROBLEM Dx: recurrent R pleural effusion      PLAN:  case d/w Dr Sultana and ER staff  please obtain CT chest noncontrast  plan pending results    Ashanti JUSTIN [id number] HPI:  73 y/o female with PMH of Rectal cancer with local pelvic recurrence, 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 Pt sent in from [facility] oncologist after CXR revealed moderate R effusion. CXR completed at [facility]. Pt admits increasing STEWART over the last few weeks.  Pt was admitted last October 2024 and had R thoracentesis. Pt encourage to f/u w Dr Sultana in outpt office but did not make appointment. She also had a thoracentesis prior to that admission in October.          PAST MEDICAL & SURGICAL HISTORY:  Colorectal cancer      S/P colon resection          REVIEW OF SYSTEMS      General:No Weight change/ Fatigue/ HA/Dizzy	    Skin/Breast: No Rashes/ Lesions/ Masses  	  Ophthalmologic: No Blurry vision/ Glaucoma/ Blindness  	  ENMT: No Hearing loss/ Drainage/ Lesions	    Respiratory and Thorax: see HPI  	  Cardiovascular: No Chest pain/ Palpitations/ Diaphoresis	    Gastrointestinal: No Nausea/ Vomiting/ Constipation/ Appetite Change	    Genitourinary: No Heamturia/ Dysuria/ Frequency change/ Impotence	    Musculoskeletal: No Pain/ Weakness/ Claudication	    Neurological: No Seizures/ TIA/CVA/ Parastesias	    Psychiatric: No Dementia/ Depression/ SI/HI	    Hematology/Lymphatics: No hx of bleeding/ Edema	    Endocrine:	No Hyperglycemia/ Hypoglycemia    Allergic/Immunologic:	 No Anaphylaxis/ Intolerance/ Recent illnesses    MEDICATIONS  (STANDING):    MEDICATIONS  (PRN):      Allergies    sulfa drugs (Pruritus)    Intolerances        SOCIAL HISTORY:  no toxic habits  lives alone    FAMILY HISTORY:    unless noted, no significant family hx with Mother, Father, Siblings    Vital Signs Last 24 Hrs  T(C): 36.7 (17 Jan 2025 16:51), Max: 36.8 (17 Jan 2025 14:28)  T(F): 98.1 (17 Jan 2025 16:51), Max: 98.3 (17 Jan 2025 14:28)  HR: 105 (17 Jan 2025 16:51) (105 - 115)  BP: 152/71 (17 Jan 2025 16:51) (142/79 - 152/71)  BP(mean): --  RR: 17 (17 Jan 2025 16:51) (16 - 17)  SpO2: 100% (17 Jan 2025 16:51) (94% - 100%)  94% RA  100% on 2L nasal cannula       Parameters below as of 17 Jan 2025 16:51  Patient On (Oxygen Delivery Method): nasal cannula  O2 Flow (L/min): 4    PE  General: WN/WD NAD  Neurology: Awake, nonfocal, ROMEO x 4  Eyes: Scleras clear, PERRLA/ EOMI, Gross vision intact  ENT:Gross hearing intact, grossly patent pharynx, no stridor  Neck: Neck supple, trachea midline, No JVD,   Respiratory: decreased breath sounds R  100% on 2L nasal cannula  CV: RRR, S1S2, no murmurs, rubs or gallops  Abdominal: Soft, NT, ND +BS,   Extremities: No edema, + peripheral pulses  Skin: No Rashes, Hematoma, Ecchymosis  Lymphatic: No Neck, axilla, groin LAD  Psych: Oriented x 3, normal affect      LABS:                        10.6   7.97  )-----------( 271      ( 17 Jan 2025 16:12 )             35.8     01-17    135  |  100  |  18  ----------------------------<  154[H]  3.6   |  23  |  0.60    Ca    9.5      17 Jan 2025 16:12    TPro  7.4  /  Alb  3.3  /  TBili  0.3  /  DBili  x   /  AST  21  /  ALT  18  /  AlkPhos  370[H]  01-17    PT/INR - ( 17 Jan 2025 16:12 )   PT: 12.5 sec;   INR: 1.08 ratio         PTT - ( 17 Jan 2025 16:12 )  PTT:28.7 sec  Urinalysis Basic - ( 17 Jan 2025 16:12 )    Color: x / Appearance: x / SG: x / pH: x  Gluc: 154 mg/dL / Ketone: x  / Bili: x / Urobili: x   Blood: x / Protein: x / Nitrite: x   Leuk Esterase: x / RBC: x / WBC x   Sq Epi: x / Non Sq Epi: x / Bacteria: x        RADIOLOGY & ADDITIONAL STUDIES: reviewed    ASSESSMENT:   74y F w PMHx Rectal cancer with local pelvic recurrence, metastases to lungs /bone and recurrent R effusion       HEALTH ISSUES - R/O PROBLEM Dx: recurrent R pleural effusion      PLAN:  case d/w Dr Sultana and ER staff  please obtain CT chest noncontrast  plan pending results    Asahnti JUSITN [id number]

## 2025-01-17 NOTE — ED ADULT NURSE REASSESSMENT NOTE - NS ED NURSE REASSESS COMMENT FT1
Returned from break. Pt is resting in stretcher. CT surg at bedside assessing patient. Sinus tachy noted on tele monitor. Spontaneous respirations are even and unlabored on 4L NC. No distress noted. Pt offers no further complaints,. Awaiting CT Returned from break. Pt is resting in stretcher. CT surg at bedside assessing patient. Sinus tachy noted on tele monitor. Spontaneous respirations are even and unlabored on 4L NC. No distress noted. Pt offers no further complaints. VS as noted. Awaiting CT

## 2025-01-17 NOTE — ED ADULT NURSE NOTE - SUICIDE SCREENING QUESTION 2
Eliel Ruiz (: 2019) is a 4 y.o. male patient, here for evaluation of the following chief complaint(s):  Behavior Problem (In office with mom)           SUBJECTIVE/OBJECTIVE:  HPI  Eliel is here for follow up behavior concern  On the SWYC assessment, flagged positive on POSI  MCHAT done, but not reviewed with parents  At his last visit, Eliel was uncooperative, crying and vaccines were deferred until this visit  He is behind on his vaccines  He is taking an iron supplement.  Will repeat SWYC and MCHAT today since Eliel is not as fussy and parents have more time to complete the forms.           Patient Active Problem List   Diagnosis    Metatarsus adductus of both feet    Underimmunized    Immunization not carried out because of parent refusal    Prolonged fever    Iron deficiency    Behavior causing concern in biological child      No Known Allergies   Immunization History   Administered Date(s) Administered    DTaP-IPV/Hib, PENTACEL, (age 6w-4y), IM, 0.5mL 2022, 2022, 2022    Hep B, ENGERIX-B, RECOMBIVAX-HB, (age Birth - 19y), IM, 0.5mL 2022, 2022, 2022    Pneumococcal, PCV-13, PREVNAR 13, (age 6w+), IM, 0.5mL 2022, 2022        Current Outpatient Medications   Medication Instructions    ibuprofen (ADVIL;MOTRIN) 100 MG/5ML suspension Oral, 4 TIMES DAILY PRN        Review of Systems   Constitutional:  Negative for fever.   HENT:  Negative for congestion.    Respiratory:  Negative for cough.    All other systems reviewed and are negative.    Vitals:    02/15/24 1114   Temp: 97.9 °F (36.6 °C)   TempSrc: Axillary   Weight: 13.5 kg (29 lb 13 oz)   Height: 0.953 m (3' 1.5\")         Physical Exam  Vitals and nursing note reviewed.   Constitutional:       General: He is active. He is not in acute distress.He regards caregiver.      Appearance: Normal appearance. He is well-developed.      Comments: Cries on exam   HENT:      Right Ear: Tympanic membrane 
No

## 2025-01-17 NOTE — ED PROVIDER NOTE - PROGRESS NOTE DETAILS
MD Acuna: Pt signed out to me pending CTSx recs. Patient was found to have moderate loculated right pleural effusion on CT. CTSx not recommending any acute intervention at this time, but recommend can consult IR for drainage. IR consult was placed. Will admit to medicine.

## 2025-01-17 NOTE — ED ADULT TRIAGE NOTE - HEART RATE (BEATS/MIN)
Mukesh Louise may have a lump on her right breast.  Can you order a mammogram for her to have this checked. She is scheduled to see you on 10/25 for med check.   She has never had a mammogram.
115

## 2025-01-17 NOTE — ED ADULT TRIAGE NOTE - GLASGOW COMA SCALE: EYE OPENING, MLM
How Severe Are Your Bumps?: moderate Have Your Bumps Been Treated?: not been treated Is This A New Presentation, Or A Follow-Up?: Follow Up Bump (E4) spontaneous

## 2025-01-17 NOTE — ED PROVIDER NOTE - ATTENDING CONTRIBUTION TO CARE
74-year-old female past medical history of rectal cancer with mets to lungs and bones and previous PE presents to the emergency department with known right-sided pleural effusion.  Patient had no respiratory distress.  Diminished breath sounds on right.  Moving all extremities 5 out of 5.  Ambulatory.  Patient had CT performed which was concerning for loculated pleural effusion.  Patient required admission to the hospital for possible IR drainage.  Covered with antibiotics.  The patient's condition was not amenable to outpatient treatment due to either the lack of feasibility of outpatient care coordination, possibility for further decompensation with adverse outcome if discharge, or treatments and diagnostic  modalities only available during an inpatient hospitalization.

## 2025-01-17 NOTE — ED ADULT NURSE NOTE - NSFALLHARMRISKINTERV_ED_ALL_ED

## 2025-01-17 NOTE — ED ADULT NURSE REASSESSMENT NOTE - NS ED NURSE REASSESS COMMENT FT1
Pt received from dayshift RN, resting comfortably in stretcher. Vitals as documented. Denies SOB, dizziness, weakness, chest pain at this time. Pt offering no complaints. Safety and comfort maintained, pt remains on cardiac monitor, continuous 02 monitor. Pt on 4L NC, 100% oxygen saturation. Bed in lowest side rails up call bell in reach. Daughter remains at bedside. Awaiting imaging.

## 2025-01-17 NOTE — ED ADULT TRIAGE NOTE - CHIEF COMPLAINT QUOTE
pt sent by BRONSON olivarez for right side pleural drainage. as per pt, + sob/ chambers, + colon CA with mets

## 2025-01-17 NOTE — ED PROVIDER NOTE - CLINICAL SUMMARY MEDICAL DECISION MAKING FREE TEXT BOX
Manuelito Quiles MD, PGY3  74-year-old female with past medical history of rectal cancer with pelvic recurrence, metastases to lung and bones, prior PE on Lovenox presenting to emergency department sent in from [facility] for right sided pleural effusion.  Patient has history of right pleural effusion that was previously drained at [facility] by CT surgery.  Patient states she otherwise feels well, not complaining of any shortness of breath or chest pain at this time.  However does state she has had slight dyspnea on exertion over past few weeks.  States last Lovenox use was on 1–15.  Denies fever, headache, vision change, chest pain, abdominal pain, nausea, vomiting, diarrhea, extremity edema, rash.    Gen: No acute distress  HEENT: EOMI, no nasal discharge, mucous membranes moist  CV: RRR, +S1/S2, no M/R/G, 2+ radial pulses b/l  Resp: decreased lung sounds over right lung fields  GI: Abdomen soft non-distended, NTTP  MSK: No open wounds, no bruising, no LE edema  Neuro: A&Ox4, following commands, moving all four extremities spontaneously  Psych: appropriate mood    In emergency department patient is hemodynamically stable, afebrile.  Patient well-appearing no acute distress.  Decreased lung sounds appreciated over right lung field.  Discussed case with CT surgery, they will evaluate patient.  Requesting CT Noncon of chest.  Also obtain routine lab work.  Disposition pending workup and CT surgery consultation recommendations.  Will reassess.

## 2025-01-17 NOTE — ED ADULT NURSE NOTE - OBJECTIVE STATEMENT
Facilitator RN Note: Received pt into spot 1 via wheelchair , accompanied by home aide. Hx colorectal cancer. Pt c/o SOB x 1 week, came to ED requesting to have right lung drained. STEWART noted. O2 sat 2L %. Pt has been getting radiation treatments. Last treatments M-F of last week. Dr. Browning came to eval pt. Pt states does not want to be admitted to have lung drained. Pt states she wants lung drained and then dc'd to home. As per Dr. Browning, specialist will be notified. Pt placed on tele monitor ST HR 110s. Pending orders.

## 2025-01-18 DIAGNOSIS — I26.99 OTHER PULMONARY EMBOLISM WITHOUT ACUTE COR PULMONALE: ICD-10-CM

## 2025-01-18 DIAGNOSIS — J90 PLEURAL EFFUSION, NOT ELSEWHERE CLASSIFIED: ICD-10-CM

## 2025-01-18 DIAGNOSIS — C19 MALIGNANT NEOPLASM OF RECTOSIGMOID JUNCTION: ICD-10-CM

## 2025-01-18 PROCEDURE — 99223 1ST HOSP IP/OBS HIGH 75: CPT

## 2025-01-18 RX ORDER — TRAMADOL HYDROCHLORIDE 100 MG/1
50 TABLET, EXTENDED RELEASE ORAL ONCE
Refills: 0 | Status: DISCONTINUED | OUTPATIENT
Start: 2025-01-18 | End: 2025-01-18

## 2025-01-18 RX ORDER — ACETAMINOPHEN 160 MG/5ML
650 SUSPENSION ORAL ONCE
Refills: 0 | Status: COMPLETED | OUTPATIENT
Start: 2025-01-18 | End: 2025-01-18

## 2025-01-18 RX ORDER — FAMOTIDINE 10 MG/ML
20 INJECTION INTRAVENOUS AT BEDTIME
Refills: 0 | Status: DISCONTINUED | OUTPATIENT
Start: 2025-01-18 | End: 2025-02-06

## 2025-01-18 RX ORDER — SENNOSIDES 8.6 MG
2 TABLET ORAL AT BEDTIME
Refills: 0 | Status: DISCONTINUED | OUTPATIENT
Start: 2025-01-18 | End: 2025-02-02

## 2025-01-18 RX ORDER — FLUCONAZOLE 100 MG/1
150 TABLET ORAL ONCE
Refills: 0 | Status: COMPLETED | OUTPATIENT
Start: 2025-01-18 | End: 2025-01-18

## 2025-01-18 RX ORDER — ENOXAPARIN SODIUM 100 MG/ML
60 INJECTION SUBCUTANEOUS EVERY 24 HOURS
Refills: 0 | Status: DISCONTINUED | OUTPATIENT
Start: 2025-01-19 | End: 2025-01-19

## 2025-01-18 RX ORDER — ACETAMINOPHEN 160 MG/5ML
650 SUSPENSION ORAL EVERY 6 HOURS
Refills: 0 | Status: DISCONTINUED | OUTPATIENT
Start: 2025-01-18 | End: 2025-01-18

## 2025-01-18 RX ORDER — GABAPENTIN 800 MG/1
200 TABLET ORAL THREE TIMES A DAY
Refills: 0 | Status: DISCONTINUED | OUTPATIENT
Start: 2025-01-18 | End: 2025-01-18

## 2025-01-18 RX ORDER — PIPERACILLIN SODIUM AND TAZOBACTAM SODIUM 2; 250 G/50ML; MG/50ML
3.38 INJECTION, POWDER, FOR SOLUTION INTRAVENOUS ONCE
Refills: 0 | Status: COMPLETED | OUTPATIENT
Start: 2025-01-18 | End: 2025-01-18

## 2025-01-18 RX ORDER — ACETAMINOPHEN 160 MG/5ML
1000 SUSPENSION ORAL ONCE
Refills: 0 | Status: COMPLETED | OUTPATIENT
Start: 2025-01-18 | End: 2025-01-18

## 2025-01-18 RX ORDER — ONDANSETRON 4 MG/1
4 TABLET, ORALLY DISINTEGRATING ORAL EVERY 8 HOURS
Refills: 0 | Status: DISCONTINUED | OUTPATIENT
Start: 2025-01-18 | End: 2025-02-06

## 2025-01-18 RX ORDER — PANTOPRAZOLE 20 MG/1
40 TABLET, DELAYED RELEASE ORAL
Refills: 0 | Status: DISCONTINUED | OUTPATIENT
Start: 2025-01-18 | End: 2025-02-06

## 2025-01-18 RX ORDER — GABAPENTIN 800 MG/1
200 TABLET ORAL
Refills: 0 | Status: DISCONTINUED | OUTPATIENT
Start: 2025-01-18 | End: 2025-02-06

## 2025-01-18 RX ORDER — FAMOTIDINE 10 MG/ML
20 INJECTION INTRAVENOUS ONCE
Refills: 0 | Status: COMPLETED | OUTPATIENT
Start: 2025-01-18 | End: 2025-01-18

## 2025-01-18 RX ORDER — TRAMADOL HYDROCHLORIDE 100 MG/1
100 TABLET, EXTENDED RELEASE ORAL EVERY 8 HOURS
Refills: 0 | Status: DISCONTINUED | OUTPATIENT
Start: 2025-01-18 | End: 2025-01-23

## 2025-01-18 RX ORDER — ENOXAPARIN SODIUM 100 MG/ML
60 INJECTION SUBCUTANEOUS EVERY 24 HOURS
Refills: 0 | Status: DISCONTINUED | OUTPATIENT
Start: 2025-01-18 | End: 2025-01-18

## 2025-01-18 RX ORDER — FAMOTIDINE 10 MG/ML
20 INJECTION INTRAVENOUS DAILY
Refills: 0 | Status: DISCONTINUED | OUTPATIENT
Start: 2025-01-18 | End: 2025-01-18

## 2025-01-18 RX ORDER — MAGNESIUM, ALUMINUM HYDROXIDE 200-225/5
30 SUSPENSION, ORAL (FINAL DOSE FORM) ORAL EVERY 4 HOURS
Refills: 0 | Status: DISCONTINUED | OUTPATIENT
Start: 2025-01-18 | End: 2025-02-06

## 2025-01-18 RX ORDER — POLYETHYLENE GLYCOL 3350 17 G/17G
17 POWDER, FOR SOLUTION ORAL ONCE
Refills: 0 | Status: COMPLETED | OUTPATIENT
Start: 2025-01-18 | End: 2025-01-18

## 2025-01-18 RX ORDER — GABAPENTIN 800 MG/1
300 TABLET ORAL
Refills: 0 | Status: DISCONTINUED | OUTPATIENT
Start: 2025-01-18 | End: 2025-02-06

## 2025-01-18 RX ORDER — LIDOCAINE HYDROCHLORIDE 30 MG/G
1 CREAM TOPICAL DAILY
Refills: 0 | Status: DISCONTINUED | OUTPATIENT
Start: 2025-01-18 | End: 2025-02-06

## 2025-01-18 RX ORDER — SODIUM CHLORIDE 0.4 %
1 AEROSOL, SPRAY (ML) NASAL
Refills: 0 | Status: DISCONTINUED | OUTPATIENT
Start: 2025-01-18 | End: 2025-02-06

## 2025-01-18 RX ORDER — ONDANSETRON 4 MG/1
4 TABLET, ORALLY DISINTEGRATING ORAL EVERY 6 HOURS
Refills: 0 | Status: DISCONTINUED | OUTPATIENT
Start: 2025-01-18 | End: 2025-02-06

## 2025-01-18 RX ORDER — GABAPENTIN 800 MG/1
200 TABLET ORAL ONCE
Refills: 0 | Status: COMPLETED | OUTPATIENT
Start: 2025-01-18 | End: 2025-01-18

## 2025-01-18 RX ORDER — ACETAMINOPHEN, DIPHENHYDRAMINE HCL, PHENYLEPHRINE HCL 325; 25; 5 MG/1; MG/1; MG/1
3 TABLET ORAL AT BEDTIME
Refills: 0 | Status: DISCONTINUED | OUTPATIENT
Start: 2025-01-18 | End: 2025-02-06

## 2025-01-18 RX ORDER — VANCOMYCIN HYDROCHLORIDE 50 MG/ML
1000 KIT ORAL ONCE
Refills: 0 | Status: COMPLETED | OUTPATIENT
Start: 2025-01-18 | End: 2025-01-18

## 2025-01-18 RX ADMIN — TRAMADOL HYDROCHLORIDE 100 MILLIGRAM(S): 100 TABLET, EXTENDED RELEASE ORAL at 21:23

## 2025-01-18 RX ADMIN — TRAMADOL HYDROCHLORIDE 100 MILLIGRAM(S): 100 TABLET, EXTENDED RELEASE ORAL at 22:00

## 2025-01-18 RX ADMIN — TRAMADOL HYDROCHLORIDE 100 MILLIGRAM(S): 100 TABLET, EXTENDED RELEASE ORAL at 11:45

## 2025-01-18 RX ADMIN — GABAPENTIN 300 MILLIGRAM(S): 800 TABLET ORAL at 21:58

## 2025-01-18 RX ADMIN — ACETAMINOPHEN 1000 MILLIGRAM(S): 160 SUSPENSION ORAL at 18:00

## 2025-01-18 RX ADMIN — POLYETHYLENE GLYCOL 3350 17 GRAM(S): 17 POWDER, FOR SOLUTION ORAL at 22:37

## 2025-01-18 RX ADMIN — Medication 100 MILLIGRAM(S): at 21:24

## 2025-01-18 RX ADMIN — VANCOMYCIN HYDROCHLORIDE 250 MILLIGRAM(S): KIT at 03:31

## 2025-01-18 RX ADMIN — LIDOCAINE HYDROCHLORIDE 1 PATCH: 30 CREAM TOPICAL at 19:00

## 2025-01-18 RX ADMIN — PIPERACILLIN SODIUM AND TAZOBACTAM SODIUM 200 GRAM(S): 2; 250 INJECTION, POWDER, FOR SOLUTION INTRAVENOUS at 02:18

## 2025-01-18 RX ADMIN — FLUCONAZOLE 150 MILLIGRAM(S): 100 TABLET ORAL at 02:12

## 2025-01-18 RX ADMIN — LIDOCAINE HYDROCHLORIDE 1 PATCH: 30 CREAM TOPICAL at 23:07

## 2025-01-18 RX ADMIN — GABAPENTIN 200 MILLIGRAM(S): 800 TABLET ORAL at 01:26

## 2025-01-18 RX ADMIN — GABAPENTIN 200 MILLIGRAM(S): 800 TABLET ORAL at 15:43

## 2025-01-18 RX ADMIN — LIDOCAINE HYDROCHLORIDE 1 PATCH: 30 CREAM TOPICAL at 11:14

## 2025-01-18 RX ADMIN — FAMOTIDINE 20 MILLIGRAM(S): 10 INJECTION INTRAVENOUS at 21:24

## 2025-01-18 RX ADMIN — Medication 1 DROP(S): at 21:24

## 2025-01-18 RX ADMIN — TRAMADOL HYDROCHLORIDE 100 MILLIGRAM(S): 100 TABLET, EXTENDED RELEASE ORAL at 11:13

## 2025-01-18 RX ADMIN — Medication 2 TABLET(S): at 21:24

## 2025-01-18 RX ADMIN — ACETAMINOPHEN 1000 MILLIGRAM(S): 160 SUSPENSION ORAL at 17:29

## 2025-01-18 RX ADMIN — FAMOTIDINE 20 MILLIGRAM(S): 10 INJECTION INTRAVENOUS at 02:12

## 2025-01-18 NOTE — H&P ADULT - PROBLEM SELECTOR PLAN 3
Cancer-associated.  - Continue lovenox 60mg daily, which is slightly less than 1.5mg/kg daily but consistent with home regimen  - Will need accurate weight to see if dose needs adjustment; ideally, pt would be agreeable to 1mg/kg BID for more reliable blood concentration Cancer-associated.  - Continue lovenox 60mg daily, which is slightly less than 1.5mg/kg daily but consistent with home regimen  - Will need accurate weight to see if dose needs adjustment; ideally, pt would be agreeable to 1mg/kg BID for more reliable blood concentration  - Will need to hold lovenox for 24 hours before thoracentesis

## 2025-01-18 NOTE — H&P ADULT - HISTORY OF PRESENT ILLNESS
74F with rectal cancer with bony and lung metastases s/p RT currently on chemotherapy, DVT/PE on lovenox,  who presents after finding of right-sided pleural effusion. Pt states at baseline she was ambulating 5908-1455 steps per day and now can barely walk across the room to the bathroom. Does have some associated chest wall pain that also relates to previous RT, which is stable. No current cough, fever, chills. Fairly good appetite and tolerating regular diet. Eager to have effusion drained as she needs to return home to receive medication as part of a clinical trial.

## 2025-01-18 NOTE — H&P ADULT - NSCORESITESY/N_GEN_A_CORE_RD
Sidney Yan (DO)  Gastroenterology; Internal Medicine  [address] [place] [address] [address]  [place] [address]  Phone: [phone]  Fax: [phone]  Follow Up Time:     Deniz Bolden ([place] [address]  Phone: [phone]  Fax: [phone]  Follow Up Time:     Mike Brand)  Infectious Disease; Internal Medicine  [address] [place] [address]  Phone: [phone]  Fax: [phone]  Follow Up Time: Sidney Yan (DO)  Gastroenterology; Internal Medicine  [address] [facility] [address]  [place] [address]  Phone: [phone]  Fax: [phone]  Follow Up Time:     Deniz Bolden ()  Family Medicine  [address] [place] [address]  Phone: [phone]  Fax: [phone]  Follow Up Time:     Mike Brand)  Infectious Disease; Internal Medicine  [address] [place] [address]  Phone: [phone]  Fax: [phone]  Follow Up Time:     Deniz Shin (MD)  Surgery  [address] [address]  [address] [facility], [address]  [place] [address]  Phone: [phone]  Fax: [phone]  Follow Up Time: No

## 2025-01-18 NOTE — PROGRESS NOTE ADULT - SUBJECTIVE AND OBJECTIVE BOX
Thoracic Surgery Daily Progress Note   TAHIR PENA | [id number] | DAbhinav Gilletteoua  --------------------------------------------------------------------------------------------------------------------  SUBJECTIVE / 24H EVENTS  Patient seen and examined on morning rounds. No acute events overnight. Patient resting comfortably in bed.   --------------------------------------------------------------------------------------------------------------------  OBJECTIVE:    VITAL SIGNS:  T(C): 36.6 (25 @ 10:12), Max: 36.8 (25 @ 14:28)  HR: 94 (25 @ 10:12) (88 - 115)  BP: 136/72 (25 @ 10:12) (121/76 - 153/76)  RR: 18 (25 @ 10:12) (16 - 20)  SpO2: 96% (25 @ 10:12) (94% - 100%)  Daily Height in cm: 165.1 (2025 05:00)    Daily Weight in k.1 (2025 05:00)      PHYSICAL EXAM:  Gen: NAD  Resp: Respirations unlabored. Bilateral chest rise.   Card: RRR.   GI: Soft. Nontender. Nondistended.   Skin: Warm, well perfused, no masses or organomegaly  EXT: No clubbing, cyanosis, or edema    LAB VALUES:      135  |  100  |  18  ----------------------------<  154[H]  3.6   |  23  |  0.60    Ca    9.5      2025 16:12    TPro  7.4  /  Alb  3.3  /  TBili  0.3  /  DBili  x   /  AST  21  /  ALT  18  /  AlkPhos  370[H]                                 10.6   7.97  )-----------( 271      ( 2025 16:12 )             35.8     LIVER FUNCTIONS - ( 2025 16:12 )  Alb: 3.3 g/dL / Pro: 7.4 g/dL / ALK PHOS: 370 U/L / ALT: 18 U/L / AST: 21 U/L / GGT: x           PT/INR - ( 2025 16:12 )   PT: 12.5 sec;   INR: 1.08 ratio         PTT - ( 2025 16:12 )  PTT:28.7 sec        Urinalysis Basic - ( 2025 16:12 )    Color: x / Appearance: x / SG: x / pH: x  Gluc: 154 mg/dL / Ketone: x  / Bili: x / Urobili: x   Blood: x / Protein: x / Nitrite: x   Leuk Esterase: x / RBC: x / WBC x   Sq Epi: x / Non Sq Epi: x / Bacteria: x        MICROBIOLOGY:    No new microbiology data for review.     RADIOLOGY:  PACS Image: Image(s) Available (25 @ 21:44)  PACS Image: Image(s) Available (25 @ 16:49)    No new radiographic images for review.    MEDICATIONS  (STANDING):  artificial tears (preservative free) Ophthalmic Solution 1 Drop(s) Both EYES three times a day  benzonatate 100 milliGRAM(s) Oral three times a day  famotidine    Tablet 20 milliGRAM(s) Oral at bedtime  gabapentin 200 milliGRAM(s) Oral three times a day  influenza  Vaccine (HIGH DOSE) 0.5 milliLiter(s) IntraMuscular once  lidocaine   4% Patch 1 Patch Transdermal daily  pantoprazole    Tablet 40 milliGRAM(s) Oral before breakfast  senna 2 Tablet(s) Oral at bedtime    MEDICATIONS  (PRN):  acetaminophen     Tablet .. 650 milliGRAM(s) Oral every 6 hours PRN Temp greater or equal to 38C (100.4F), Mild Pain (1 - 3)  aluminum hydroxide/magnesium hydroxide/simethicone Suspension 30 milliLiter(s) Oral every 4 hours PRN Dyspepsia  melatonin 3 milliGRAM(s) Oral at bedtime PRN Insomnia  ondansetron Injectable 4 milliGRAM(s) IV Push every 8 hours PRN Nausea and/or Vomiting  sodium chloride 0.65% Nasal 1 Spray(s) Both Nostrils four times a day PRN Nasal Congestion  traMADol 100 milliGRAM(s) Oral every 8 hours PRN Moderate Pain (4 - 6)

## 2025-01-18 NOTE — CONSULT NOTE ADULT - ASSESSMENT
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 Thoracic surgery was consulted who recommended no acute intervention.    Allergies: sulfa drugs (Pruritus)    Medications (Abx/Cardiac/Anticoagulation/Blood Products)    fluconAZOLE   Tablet: 150 milliGRAM(s) Oral (01-18 @ 02:12)  piperacillin/tazobactam IVPB...: 200 mL/Hr IV Intermittent (01-18 @ 02:18)  vancomycin  IVPB.: 250 mL/Hr IV Intermittent (01-18 @ 03:31)    Data:  165.1  52.2  T(C): 36.6  HR: 94  BP: 136/72  RR: 18  SpO2: 96%    -WBC 7.97 / HgB 10.6 / Hct 35.8 / Plt 271  -Na 135 / Cl 100 / BUN 18 / Glucose 154  -K 3.6 / CO2 23 / Cr 0.60  -ALT 18 / Alk Phos 370 / T.Bili 0.3  -INR 1.08 / PTT 28.7      Radiology: reviewed    Assessment/Plan: 74F with rectal cancer with bony and lung metastases s/p RT currently on chemotherapy, 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 IR consulted for chest tube for pleural effusion  -- Case and images reviewed with IR Attending Dr. Young  -- CT Chest reveals moderate right sided loculated pleural effusion, also noted on CT chest of 10/2024  -- IR can plan to perform chest tube placement tentatively on tuesday 1/21  -- Patient currently satting 96% on 2L Nasal cannula  -- NPO at midnight prior to procedure  -- Patient on therapeutic lovenox which would need be held 24 hours prior to procedure  -- please place IR procedure request order under Dr. Young  -- please place IR pre=procedure note    --  Carmelo Zuleta MD PGY-4  Interventional Radiology  IR Pager: [phone]  Available on Teams     - Non-emergent consults: Place IR consult order in [place]  - Emergent issues (pager): [facility] [phone]; [facility] [phone]; [phone]  - Scheduling questions: [facility] [phone]; [facility] [phone]  - Clinic/outpatient booking: [facility] [phone]; [facility] [phone] Interventional Radiology    Evaluate for Procedure: Thoracentesis    HPI: 74F with rectal cancer with bony and lung metastases s/p RT currently on chemotherapy, 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 Thoracic surgery was consulted who recommended no acute intervention.    Allergies: sulfa drugs (Pruritus)    Medications (Abx/Cardiac/Anticoagulation/Blood Products)    fluconAZOLE   Tablet: 150 milliGRAM(s) Oral (01-18 @ 02:12)  piperacillin/tazobactam IVPB...: 200 mL/Hr IV Intermittent (01-18 @ 02:18)  vancomycin  IVPB.: 250 mL/Hr IV Intermittent (01-18 @ 03:31)    Data:  165.1  52.2  T(C): 36.6  HR: 94  BP: 136/72  RR: 18  SpO2: 96%    -WBC 7.97 / HgB 10.6 / Hct 35.8 / Plt 271  -Na 135 / Cl 100 / BUN 18 / Glucose 154  -K 3.6 / CO2 23 / Cr 0.60  -ALT 18 / Alk Phos 370 / T.Bili 0.3  -INR 1.08 / PTT 28.7      Radiology: reviewed    Assessment/Plan: 74F with rectal cancer with bony and lung metastases s/p RT currently on chemotherapy, 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 IR consulted for chest tube for pleural effusion  -- Case and images reviewed with IR Attending Dr. Young  -- CT Chest reveals moderate right sided loculated pleural effusion, also noted on CT chest from 10/2024  -- IR can plan to perform chest tube placement tentatively on Tuesday 1/21  -- Patient currently hemodynamically stable  -- NPO at midnight prior to procedure  -- Patient on therapeutic lovenox which would need be held 24 hours prior to procedure  -- please place IR procedure request order under Dr. Young  -- please place IR pre-procedure note    --  Carmelo Zuleta MD PGY-4  Interventional Radiology  IR Pager: [phone]  Available on Teams     - Non-emergent consults: Place IR consult order in [place]  - Emergent issues (pager): [facility] [phone]; [facility] [phone]; [phone]  - Scheduling questions: [facility] [phone]; [facility] [phone]  - Clinic/outpatient booking: [facility] [phone]; [facility] [phone]

## 2025-01-18 NOTE — H&P ADULT - PROBLEM SELECTOR PLAN 1
Presumed malignant in setting of lung metastasis.   - Appreciate Thoracic Surgery and IR consults  - Will see if thoracentesis can be expedited as pt needs to be back home as soon as feasible to receive clinical trial medication  - While pt is immunocompromised, there is no strong evidence she has PNA or other acute infection. If pt develops fever or worsening respiratory or other localizing symptoms can resume antibiotics.

## 2025-01-18 NOTE — CONSULT NOTE ADULT - SUBJECTIVE AND OBJECTIVE BOX
Reason for consult:    HPI: 74 year old female with history of recurrent rectal cancer with recent progression on FOLFOX admitted for dyspnea and right pleural effusion.       PAST MEDICAL & SURGICAL HISTORY:  Colorectal cancer      S/P colon resection          FAMILY HISTORY:      Alochol: Denied  Smoking: Nonsmoker  Drug Use: Denied  Marital Status:         Allergies    sulfa drugs (Pruritus)    Intolerances        MEDICATIONS  (STANDING):  artificial tears (preservative free) Ophthalmic Solution 1 Drop(s) Both EYES three times a day  benzonatate 100 milliGRAM(s) Oral three times a day  famotidine    Tablet 20 milliGRAM(s) Oral at bedtime  gabapentin 200 milliGRAM(s) Oral three times a day  influenza  Vaccine (HIGH DOSE) 0.5 milliLiter(s) IntraMuscular once  lidocaine   4% Patch 1 Patch Transdermal daily  pantoprazole    Tablet 40 milliGRAM(s) Oral before breakfast  senna 2 Tablet(s) Oral at bedtime    MEDICATIONS  (PRN):  acetaminophen     Tablet .. 650 milliGRAM(s) Oral every 6 hours PRN Temp greater or equal to 38C (100.4F), Mild Pain (1 - 3)  aluminum hydroxide/magnesium hydroxide/simethicone Suspension 30 milliLiter(s) Oral every 4 hours PRN Dyspepsia  melatonin 3 milliGRAM(s) Oral at bedtime PRN Insomnia  ondansetron Injectable 4 milliGRAM(s) IV Push every 8 hours PRN Nausea and/or Vomiting  sodium chloride 0.65% Nasal 1 Spray(s) Both Nostrils four times a day PRN Nasal Congestion  traMADol 100 milliGRAM(s) Oral every 8 hours PRN Moderate Pain (4 - 6)      ROS  No fever, sweats, chills  No epistaxis, HA, sore throat  No CP, SOB, cough, sputum  No n/v/d, abd pain, melena, hematochezia  No edema  No rash  No anxiety  No back pain, joint pain  No bleeding, bruising  No dysuria, hematuria    T(C): 36.6 (01-18-25 @ 10:12), Max: 36.8 (01-17-25 @ 14:28)  HR: 94 (01-18-25 @ 10:12) (88 - 115)  BP: 136/72 (01-18-25 @ 10:12) (121/76 - 153/76)  RR: 18 (01-18-25 @ 10:12) (16 - 20)  SpO2: 96% (01-18-25 @ 10:12) (94% - 100%)  Wt(kg): --    PE  NAD  Awake, alert  Anicteric, MMM  RRR  CTAB  Abd soft, NT, ND  No c/c/e  No rash grossly  FROM                          10.6   7.97  )-----------( 271      ( 17 Jan 2025 16:12 )             35.8       01-17    135  |  100  |  18  ----------------------------<  154[H]  3.6   |  23  |  0.60    Ca    9.5      17 Jan 2025 16:12    TPro  7.4  /  Alb  3.3  /  TBili  0.3  /  DBili  x   /  AST  21  /  ALT  18  /  AlkPhos  370[H]  01-17

## 2025-01-18 NOTE — H&P ADULT - SKIN
warm and dry Rituxan Pregnancy And Lactation Text: This medication is Pregnancy Category C and it isn't know if it is safe during pregnancy. It is unknown if this medication is excreted in breast milk but similar antibodies are known to be excreted.

## 2025-01-18 NOTE — CONSULT NOTE ADULT - ASSESSMENT
74 year old female with history of recurrent rectal cancer admitted for dypnea with right pleural effusion    1. Recurrent metastatic rectal cancer  ·	Patient follows with [facility]   ·	recent progression on FOLFOX  ·	planning to switch to lonsurf and bevacizumab  ·	s/p palliative radiation therapy to the right rib area as well as spine.    ·	no disease directed therapy as inpatient  ·	patient to follow with [facility] upon discharge    2.  Dyspnea  ·	Patient has loculated right pleural effusion  ·	follow CT surgery recs regarding thoracentesis.    Will cont to follow

## 2025-01-18 NOTE — H&P ADULT - ASSESSMENT
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

## 2025-01-18 NOTE — H&P ADULT - PROBLEM SELECTOR PLAN 2
Metastatic, in process of transitioning chemotherapy regimens.  - Appreciate Heme/Onc consult  - Outpatient treatment through [facility]  - Continue tramadol 100mg TID  - Continue tylenol TID  - Continue lidoderm to right upper back  - Pt states she now takes gabapentin 300mg qAM, 200mg midday, and 300mg qPM; will adjust orders  - Will provide bowel regimen and PPI/H2-blocker per home regimen

## 2025-01-18 NOTE — PROGRESS NOTE ADULT - ASSESSMENT
74y F w PMHx Rectal cancer with local pelvic recurrence, metastases to lungs /bone and recurrent loculated R effusion.     Plan:   - Patient resting comfortably on 2L NC   - Effusion with multiple pockets, loculated   - Recommend IR evaluation for drainage of effusion    Thoracic surgery   [id number]

## 2025-01-19 LAB
ANION GAP SERPL CALC-SCNC: 12 MMOL/L — SIGNIFICANT CHANGE UP (ref 7–14)
BASOPHILS # BLD AUTO: 0.02 K/UL — SIGNIFICANT CHANGE UP (ref 0–0.2)
BASOPHILS NFR BLD AUTO: 0.3 % — SIGNIFICANT CHANGE UP (ref 0–2)
BLD GP AB SCN SERPL QL: NEGATIVE — SIGNIFICANT CHANGE UP
BUN SERPL-MCNC: 13 MG/DL — SIGNIFICANT CHANGE UP (ref 7–23)
CALCIUM SERPL-MCNC: 9 MG/DL — SIGNIFICANT CHANGE UP (ref 8.4–10.5)
CHLORIDE SERPL-SCNC: 100 MMOL/L — SIGNIFICANT CHANGE UP (ref 98–107)
CO2 SERPL-SCNC: 23 MMOL/L — SIGNIFICANT CHANGE UP (ref 22–31)
CREAT SERPL-MCNC: 0.55 MG/DL — SIGNIFICANT CHANGE UP (ref 0.5–1.3)
EGFR: 97 ML/MIN/1.73M2 — SIGNIFICANT CHANGE UP
EOSINOPHIL # BLD AUTO: 0.12 K/UL — SIGNIFICANT CHANGE UP (ref 0–0.5)
EOSINOPHIL NFR BLD AUTO: 1.7 % — SIGNIFICANT CHANGE UP (ref 0–6)
GLUCOSE SERPL-MCNC: 90 MG/DL — SIGNIFICANT CHANGE UP (ref 70–99)
HCT VFR BLD CALC: 28 % — LOW (ref 34.5–45)
HCT VFR BLD CALC: 30.1 % — LOW (ref 34.5–45)
HGB BLD-MCNC: 8.7 G/DL — LOW (ref 11.5–15.5)
HGB BLD-MCNC: 9.3 G/DL — LOW (ref 11.5–15.5)
IANC: 6.03 K/UL — SIGNIFICANT CHANGE UP (ref 1.8–7.4)
IMM GRANULOCYTES NFR BLD AUTO: 0.6 % — SIGNIFICANT CHANGE UP (ref 0–0.9)
LYMPHOCYTES # BLD AUTO: 0.33 K/UL — LOW (ref 1–3.3)
LYMPHOCYTES # BLD AUTO: 4.6 % — LOW (ref 13–44)
MAGNESIUM SERPL-MCNC: 1.8 MG/DL — SIGNIFICANT CHANGE UP (ref 1.6–2.6)
MCHC RBC-ENTMCNC: 29.1 PG — SIGNIFICANT CHANGE UP (ref 27–34)
MCHC RBC-ENTMCNC: 29.2 PG — SIGNIFICANT CHANGE UP (ref 27–34)
MCHC RBC-ENTMCNC: 30.9 G/DL — LOW (ref 32–36)
MCHC RBC-ENTMCNC: 31.1 G/DL — LOW (ref 32–36)
MCV RBC AUTO: 93.6 FL — SIGNIFICANT CHANGE UP (ref 80–100)
MCV RBC AUTO: 94.7 FL — SIGNIFICANT CHANGE UP (ref 80–100)
MONOCYTES # BLD AUTO: 0.62 K/UL — SIGNIFICANT CHANGE UP (ref 0–0.9)
MONOCYTES NFR BLD AUTO: 8.7 % — SIGNIFICANT CHANGE UP (ref 2–14)
NEUTROPHILS # BLD AUTO: 6.03 K/UL — SIGNIFICANT CHANGE UP (ref 1.8–7.4)
NEUTROPHILS NFR BLD AUTO: 84.1 % — HIGH (ref 43–77)
NRBC # BLD AUTO: 0 K/UL — SIGNIFICANT CHANGE UP (ref 0–0)
NRBC # BLD AUTO: 0 K/UL — SIGNIFICANT CHANGE UP (ref 0–0)
NRBC # BLD: 0 /100 WBCS — SIGNIFICANT CHANGE UP (ref 0–0)
NRBC # BLD: 0 /100 WBCS — SIGNIFICANT CHANGE UP (ref 0–0)
NRBC # FLD: 0 K/UL — SIGNIFICANT CHANGE UP (ref 0–0)
NRBC # FLD: 0 K/UL — SIGNIFICANT CHANGE UP (ref 0–0)
NRBC BLD-RTO: 0 /100 WBCS — SIGNIFICANT CHANGE UP (ref 0–0)
NRBC BLD-RTO: 0 /100 WBCS — SIGNIFICANT CHANGE UP (ref 0–0)
PHOSPHATE SERPL-MCNC: 3.4 MG/DL — SIGNIFICANT CHANGE UP (ref 2.5–4.5)
PLATELET # BLD AUTO: 204 K/UL — SIGNIFICANT CHANGE UP (ref 150–400)
PLATELET # BLD AUTO: 245 K/UL — SIGNIFICANT CHANGE UP (ref 150–400)
POTASSIUM SERPL-MCNC: 4.1 MMOL/L — SIGNIFICANT CHANGE UP (ref 3.5–5.3)
POTASSIUM SERPL-SCNC: 4.1 MMOL/L — SIGNIFICANT CHANGE UP (ref 3.5–5.3)
RBC # BLD: 2.99 M/UL — LOW (ref 3.8–5.2)
RBC # BLD: 3.18 M/UL — LOW (ref 3.8–5.2)
RBC # FLD: 16.4 % — HIGH (ref 10.3–14.5)
RBC # FLD: 16.7 % — HIGH (ref 10.3–14.5)
RH IG SCN BLD-IMP: POSITIVE — SIGNIFICANT CHANGE UP
SODIUM SERPL-SCNC: 135 MMOL/L — SIGNIFICANT CHANGE UP (ref 135–145)
WBC # BLD: 7.16 K/UL — SIGNIFICANT CHANGE UP (ref 3.8–10.5)
WBC # BLD: 7.92 K/UL — SIGNIFICANT CHANGE UP (ref 3.8–10.5)
WBC # FLD AUTO: 7.16 K/UL — SIGNIFICANT CHANGE UP (ref 3.8–10.5)
WBC # FLD AUTO: 7.92 K/UL — SIGNIFICANT CHANGE UP (ref 3.8–10.5)

## 2025-01-19 RX ORDER — ACETAMINOPHEN 160 MG/5ML
1000 SUSPENSION ORAL EVERY 8 HOURS
Refills: 0 | Status: DISCONTINUED | OUTPATIENT
Start: 2025-01-19 | End: 2025-01-23

## 2025-01-19 RX ADMIN — Medication 1 DROP(S): at 06:51

## 2025-01-19 RX ADMIN — TRAMADOL HYDROCHLORIDE 100 MILLIGRAM(S): 100 TABLET, EXTENDED RELEASE ORAL at 22:42

## 2025-01-19 RX ADMIN — LIDOCAINE HYDROCHLORIDE 1 PATCH: 30 CREAM TOPICAL at 12:16

## 2025-01-19 RX ADMIN — ACETAMINOPHEN 1000 MILLIGRAM(S): 160 SUSPENSION ORAL at 16:55

## 2025-01-19 RX ADMIN — GABAPENTIN 300 MILLIGRAM(S): 800 TABLET ORAL at 10:33

## 2025-01-19 RX ADMIN — GABAPENTIN 300 MILLIGRAM(S): 800 TABLET ORAL at 23:15

## 2025-01-19 RX ADMIN — Medication 100 MILLIGRAM(S): at 15:07

## 2025-01-19 RX ADMIN — Medication 1 DROP(S): at 23:18

## 2025-01-19 RX ADMIN — Medication 1 DROP(S): at 15:08

## 2025-01-19 RX ADMIN — PANTOPRAZOLE 40 MILLIGRAM(S): 20 TABLET, DELAYED RELEASE ORAL at 06:51

## 2025-01-19 RX ADMIN — ENOXAPARIN SODIUM 60 MILLIGRAM(S): 100 INJECTION SUBCUTANEOUS at 15:08

## 2025-01-19 RX ADMIN — TRAMADOL HYDROCHLORIDE 100 MILLIGRAM(S): 100 TABLET, EXTENDED RELEASE ORAL at 10:34

## 2025-01-19 RX ADMIN — FAMOTIDINE 20 MILLIGRAM(S): 10 INJECTION INTRAVENOUS at 23:16

## 2025-01-19 RX ADMIN — GABAPENTIN 200 MILLIGRAM(S): 800 TABLET ORAL at 15:17

## 2025-01-19 RX ADMIN — Medication 100 MILLIGRAM(S): at 06:51

## 2025-01-19 RX ADMIN — Medication 100 MILLIGRAM(S): at 21:44

## 2025-01-19 RX ADMIN — TRAMADOL HYDROCHLORIDE 100 MILLIGRAM(S): 100 TABLET, EXTENDED RELEASE ORAL at 21:42

## 2025-01-19 NOTE — PHYSICAL THERAPY INITIAL EVALUATION ADULT - MANUAL MUSCLE TESTING RESULTS, REHAB EVAL
bilateral upper extremities: at least 3/5, right lower extremity: 3-/5 left lower extremity: at least 3/5/grossly assessed due to

## 2025-01-19 NOTE — PROGRESS NOTE ADULT - SUBJECTIVE AND OBJECTIVE BOX
SUBJECTIVE/ OVERNIGHT EVENTS:  medications reviewed with the pt.   await IR thoracentesis.  no cp, no sob, no n/v/d. no abdominal pain.  no headache, no dizziness.   --------------------------------------------------------------------------------------------  LABS:                        8.7    7.16  )-----------( 204      ( 19 Jan 2025 05:00 )             28.0     01-19    135  |  100  |  13  ----------------------------<  90  4.1   |  23  |  0.55    Ca    9.0      19 Jan 2025 05:00  Phos  3.4     01-19  Mg     1.80     01-19    TPro  7.4  /  Alb  3.3  /  TBili  0.3  /  DBili  x   /  AST  21  /  ALT  18  /  AlkPhos  370[H]  01-17    PT/INR - ( 17 Jan 2025 16:12 )   PT: 12.5 sec;   INR: 1.08 ratio         PTT - ( 17 Jan 2025 16:12 )  PTT:28.7 sec  CAPILLARY BLOOD GLUCOSE            Urinalysis Basic - ( 19 Jan 2025 05:00 )    Color: x / Appearance: x / SG: x / pH: x  Gluc: 90 mg/dL / Ketone: x  / Bili: x / Urobili: x   Blood: x / Protein: x / Nitrite: x   Leuk Esterase: x / RBC: x / WBC x   Sq Epi: x / Non Sq Epi: x / Bacteria: x        RADIOLOGY & ADDITIONAL TESTS:    Imaging Personally Reviewed:  [x] YES  [ ] NO    Consultant(s) Notes Reviewed:  [x] YES  [ ] NO    MEDICATIONS  (STANDING):  artificial tears (preservative free) Ophthalmic Solution 1 Drop(s) Both EYES three times a day  benzonatate 100 milliGRAM(s) Oral three times a day  enoxaparin Injectable 60 milliGRAM(s) SubCutaneous every 24 hours  famotidine    Tablet 20 milliGRAM(s) Oral at bedtime  gabapentin 300 milliGRAM(s) Oral <User Schedule>  gabapentin 200 milliGRAM(s) Oral <User Schedule>  influenza  Vaccine (HIGH DOSE) 0.5 milliLiter(s) IntraMuscular once  lidocaine   4% Patch 1 Patch Transdermal daily  pantoprazole    Tablet 40 milliGRAM(s) Oral before breakfast  senna 2 Tablet(s) Oral at bedtime    MEDICATIONS  (PRN):  aluminum hydroxide/magnesium hydroxide/simethicone Suspension 30 milliLiter(s) Oral every 4 hours PRN Dyspepsia  melatonin 3 milliGRAM(s) Oral at bedtime PRN Insomnia  ondansetron   Disintegrating Tablet 4 milliGRAM(s) Oral every 6 hours PRN Nausea and/or Vomiting  ondansetron Injectable 4 milliGRAM(s) IV Push every 8 hours PRN Nausea and/or Vomiting  sodium chloride 0.65% Nasal 1 Spray(s) Both Nostrils four times a day PRN Nasal Congestion  traMADol 100 milliGRAM(s) Oral every 8 hours PRN Moderate Pain (4 - 6)      Care Discussed with Consultants/Other Providers [x] YES  [ ] NO    Vital Signs Last 24 Hrs  T(C): 36.6 (19 Jan 2025 10:24), Max: 36.7 (18 Jan 2025 18:24)  T(F): 97.8 (19 Jan 2025 10:24), Max: 98.1 (18 Jan 2025 18:24)  HR: 110 (19 Jan 2025 10:24) (93 - 110)  BP: 133/61 (19 Jan 2025 10:24) (112/82 - 137/67)  BP(mean): --  RR: 18 (19 Jan 2025 10:24) (18 - 18)  SpO2: 94% (19 Jan 2025 10:24) (94% - 100%)    Parameters below as of 19 Jan 2025 06:00  Patient On (Oxygen Delivery Method): nasal cannula  O2 Flow (L/min): 2    I&O's Summary      PHYSICAL EXAM:  GENERAL: NAD, thin-elderly, comfortable on room air  HEAD:  Atraumatic, Normocephalic  EYES: EOMI, PERRLA, conjunctiva and sclera clear  NECK: Supple, No JVD  CHEST/LUNG: mild decrease breath sounds bilaterally; No wheeze   HEART: Regular rate and rhythm; No murmurs, rubs, or gallops  ABDOMEN: Soft, Nontender, Nondistended; Bowel sounds present  Neuro: AAOx3, no focal weakness   EXTREMITIES:  2+ Peripheral Pulses, No clubbing, cyanosis, or edema  SKIN: No rashes or lesions

## 2025-01-19 NOTE — PHYSICAL THERAPY INITIAL EVALUATION ADULT - PERTINENT HX OF CURRENT PROBLEM, REHAB EVAL
Pt is a 74 year old female with history of Rectal cancer with local pelvic recurrence, metastases to lungs/bones, initially presenting to outside hospital for right sided chest pain, subsequently found to have large right pleural effusion and transferred to [facility] for further management.

## 2025-01-19 NOTE — PROGRESS NOTE ADULT - ASSESSMENT
74 year old female with history of recurrent rectal cancer admitted for dypnea with right pleural effusion    1. Recurrent metastatic rectal cancer  ·	Patient follows with [facility]   ·	recent progression on FOLFOX  ·	planning to switch to lonsurf and bevacizumab  ·	s/p palliative radiation therapy to the right rib area as well as spine.    ·	no disease directed therapy as inpatient  ·	patient to follow with [facility] upon discharge    2.  Dyspnea  ·	Patient has loculated right pleural effusion  ·	Appreciate CT surgery recs  ·	IR to do yohan on 1/21    Will cont to follow

## 2025-01-19 NOTE — PHYSICAL THERAPY INITIAL EVALUATION ADULT - ADDITIONAL COMMENTS
Pt lives alone in a house with 4 stairs to enter; Pt resides on the first floor. prior to admission Pt stated she was independent with all mobility and ambulated with a straight cane. Pt owns a straight cane, rolling walker and commode. Pt stated that she was supposed to have home PT, but it didn't start yet since she was admitted to the hospital.     Pt. left comfortable sitting edge of bed with all tubes/lines intact, call bell in reach and in NAD. RNNataly, aware of session and pt current position.

## 2025-01-19 NOTE — PHYSICAL THERAPY INITIAL EVALUATION ADULT - NSPTDISCHREC_GEN_A_CORE
Home with home PT services to address current functional limitations to optimize safety within the home environment./Home PT

## 2025-01-19 NOTE — PROGRESS NOTE ADULT - SUBJECTIVE AND OBJECTIVE BOX
Patient is a 74y old  Female who presents with a chief complaint of pleural effusion (19 Jan 2025 09:50)    Subjective:  Patient seen at bedside.  Patient continues with sob.  No overnight events    MEDICATIONS  (STANDING):  artificial tears (preservative free) Ophthalmic Solution 1 Drop(s) Both EYES three times a day  benzonatate 100 milliGRAM(s) Oral three times a day  enoxaparin Injectable 60 milliGRAM(s) SubCutaneous every 24 hours  famotidine    Tablet 20 milliGRAM(s) Oral at bedtime  gabapentin 300 milliGRAM(s) Oral <User Schedule>  gabapentin 200 milliGRAM(s) Oral <User Schedule>  influenza  Vaccine (HIGH DOSE) 0.5 milliLiter(s) IntraMuscular once  lidocaine   4% Patch 1 Patch Transdermal daily  pantoprazole    Tablet 40 milliGRAM(s) Oral before breakfast  senna 2 Tablet(s) Oral at bedtime    MEDICATIONS  (PRN):  aluminum hydroxide/magnesium hydroxide/simethicone Suspension 30 milliLiter(s) Oral every 4 hours PRN Dyspepsia  melatonin 3 milliGRAM(s) Oral at bedtime PRN Insomnia  ondansetron   Disintegrating Tablet 4 milliGRAM(s) Oral every 6 hours PRN Nausea and/or Vomiting  ondansetron Injectable 4 milliGRAM(s) IV Push every 8 hours PRN Nausea and/or Vomiting  sodium chloride 0.65% Nasal 1 Spray(s) Both Nostrils four times a day PRN Nasal Congestion  traMADol 100 milliGRAM(s) Oral every 8 hours PRN Moderate Pain (4 - 6)      ROS  No fever, sweats, chills  No epistaxis, HA, sore throat  No CP, SOB, cough, sputum  No n/v/d, abd pain, melena, hematochezia  No edema  No rash  No anxiety  No back pain, joint pain  No bleeding, bruising  No dysuria, hematuria    Vital Signs Last 24 Hrs  T(C): 36.6 (19 Jan 2025 10:24), Max: 36.7 (18 Jan 2025 18:24)  T(F): 97.8 (19 Jan 2025 10:24), Max: 98.1 (18 Jan 2025 18:24)  HR: 110 (19 Jan 2025 10:24) (93 - 110)  BP: 133/61 (19 Jan 2025 10:24) (112/82 - 137/67)  BP(mean): --  RR: 18 (19 Jan 2025 10:24) (18 - 18)  SpO2: 94% (19 Jan 2025 10:24) (94% - 100%)    Parameters below as of 19 Jan 2025 06:00  Patient On (Oxygen Delivery Method): nasal cannula  O2 Flow (L/min): 2      PE  NAD  Awake, alert  Anicteric, MMM  RRR  CTAB  Abd soft, NT, ND  No c/c/e  No rash grossly  FROM                          8.7    7.16  )-----------( 204      ( 19 Jan 2025 05:00 )             28.0       01-19    135  |  100  |  13  ----------------------------<  90  4.1   |  23  |  0.55    Ca    9.0      19 Jan 2025 05:00  Phos  3.4     01-19  Mg     1.80     01-19    TPro  7.4  /  Alb  3.3  /  TBili  0.3  /  DBili  x   /  AST  21  /  ALT  18  /  AlkPhos  370[H]  01-17

## 2025-01-20 LAB
ANION GAP SERPL CALC-SCNC: 12 MMOL/L — SIGNIFICANT CHANGE UP (ref 7–14)
BASOPHILS # BLD AUTO: 0.03 K/UL — SIGNIFICANT CHANGE UP (ref 0–0.2)
BASOPHILS NFR BLD AUTO: 0.5 % — SIGNIFICANT CHANGE UP (ref 0–2)
BUN SERPL-MCNC: 13 MG/DL — SIGNIFICANT CHANGE UP (ref 7–23)
CALCIUM SERPL-MCNC: 9.3 MG/DL — SIGNIFICANT CHANGE UP (ref 8.4–10.5)
CHLORIDE SERPL-SCNC: 101 MMOL/L — SIGNIFICANT CHANGE UP (ref 98–107)
CO2 SERPL-SCNC: 24 MMOL/L — SIGNIFICANT CHANGE UP (ref 22–31)
CREAT SERPL-MCNC: 0.58 MG/DL — SIGNIFICANT CHANGE UP (ref 0.5–1.3)
EGFR: 96 ML/MIN/1.73M2 — SIGNIFICANT CHANGE UP
EOSINOPHIL # BLD AUTO: 0.07 K/UL — SIGNIFICANT CHANGE UP (ref 0–0.5)
EOSINOPHIL NFR BLD AUTO: 1.2 % — SIGNIFICANT CHANGE UP (ref 0–6)
GLUCOSE SERPL-MCNC: 91 MG/DL — SIGNIFICANT CHANGE UP (ref 70–99)
HCT VFR BLD CALC: 29.8 % — LOW (ref 34.5–45)
HGB BLD-MCNC: 8.9 G/DL — LOW (ref 11.5–15.5)
IANC: 5.1 K/UL — SIGNIFICANT CHANGE UP (ref 1.8–7.4)
IMM GRANULOCYTES NFR BLD AUTO: 0.3 % — SIGNIFICANT CHANGE UP (ref 0–0.9)
LYMPHOCYTES # BLD AUTO: 0.29 K/UL — LOW (ref 1–3.3)
LYMPHOCYTES # BLD AUTO: 4.9 % — LOW (ref 13–44)
MAGNESIUM SERPL-MCNC: 1.9 MG/DL — SIGNIFICANT CHANGE UP (ref 1.6–2.6)
MCHC RBC-ENTMCNC: 28.8 PG — SIGNIFICANT CHANGE UP (ref 27–34)
MCHC RBC-ENTMCNC: 29.9 G/DL — LOW (ref 32–36)
MCV RBC AUTO: 96.4 FL — SIGNIFICANT CHANGE UP (ref 80–100)
MONOCYTES # BLD AUTO: 0.45 K/UL — SIGNIFICANT CHANGE UP (ref 0–0.9)
MONOCYTES NFR BLD AUTO: 7.6 % — SIGNIFICANT CHANGE UP (ref 2–14)
NEUTROPHILS # BLD AUTO: 5.1 K/UL — SIGNIFICANT CHANGE UP (ref 1.8–7.4)
NEUTROPHILS NFR BLD AUTO: 85.5 % — HIGH (ref 43–77)
NRBC # BLD AUTO: 0 K/UL — SIGNIFICANT CHANGE UP (ref 0–0)
NRBC # BLD: 0 /100 WBCS — SIGNIFICANT CHANGE UP (ref 0–0)
NRBC # FLD: 0 K/UL — SIGNIFICANT CHANGE UP (ref 0–0)
NRBC BLD-RTO: 0 /100 WBCS — SIGNIFICANT CHANGE UP (ref 0–0)
PHOSPHATE SERPL-MCNC: 2.9 MG/DL — SIGNIFICANT CHANGE UP (ref 2.5–4.5)
PLATELET # BLD AUTO: 214 K/UL — SIGNIFICANT CHANGE UP (ref 150–400)
POTASSIUM SERPL-MCNC: 4.1 MMOL/L — SIGNIFICANT CHANGE UP (ref 3.5–5.3)
POTASSIUM SERPL-SCNC: 4.1 MMOL/L — SIGNIFICANT CHANGE UP (ref 3.5–5.3)
RBC # BLD: 3.09 M/UL — LOW (ref 3.8–5.2)
RBC # FLD: 16.8 % — HIGH (ref 10.3–14.5)
SODIUM SERPL-SCNC: 137 MMOL/L — SIGNIFICANT CHANGE UP (ref 135–145)
WBC # BLD: 5.96 K/UL — SIGNIFICANT CHANGE UP (ref 3.8–10.5)
WBC # FLD AUTO: 5.96 K/UL — SIGNIFICANT CHANGE UP (ref 3.8–10.5)

## 2025-01-20 PROCEDURE — 99233 SBSQ HOSP IP/OBS HIGH 50: CPT

## 2025-01-20 RX ORDER — ACETAMINOPHEN 160 MG/5ML
1000 SUSPENSION ORAL ONCE
Refills: 0 | Status: COMPLETED | OUTPATIENT
Start: 2025-01-20 | End: 2025-01-20

## 2025-01-20 RX ADMIN — FAMOTIDINE 20 MILLIGRAM(S): 10 INJECTION INTRAVENOUS at 23:53

## 2025-01-20 RX ADMIN — Medication 1 DROP(S): at 07:27

## 2025-01-20 RX ADMIN — TRAMADOL HYDROCHLORIDE 100 MILLIGRAM(S): 100 TABLET, EXTENDED RELEASE ORAL at 09:48

## 2025-01-20 RX ADMIN — TRAMADOL HYDROCHLORIDE 100 MILLIGRAM(S): 100 TABLET, EXTENDED RELEASE ORAL at 10:33

## 2025-01-20 RX ADMIN — Medication 1 DROP(S): at 23:53

## 2025-01-20 RX ADMIN — ACETAMINOPHEN 1000 MILLIGRAM(S): 160 SUSPENSION ORAL at 13:12

## 2025-01-20 RX ADMIN — ACETAMINOPHEN 400 MILLIGRAM(S): 160 SUSPENSION ORAL at 22:19

## 2025-01-20 RX ADMIN — TRAMADOL HYDROCHLORIDE 100 MILLIGRAM(S): 100 TABLET, EXTENDED RELEASE ORAL at 18:02

## 2025-01-20 RX ADMIN — Medication 2 TABLET(S): at 23:52

## 2025-01-20 RX ADMIN — LIDOCAINE HYDROCHLORIDE 1 PATCH: 30 CREAM TOPICAL at 13:13

## 2025-01-20 RX ADMIN — LIDOCAINE HYDROCHLORIDE 1 PATCH: 30 CREAM TOPICAL at 00:16

## 2025-01-20 RX ADMIN — PANTOPRAZOLE 40 MILLIGRAM(S): 20 TABLET, DELAYED RELEASE ORAL at 07:26

## 2025-01-20 RX ADMIN — Medication 100 MILLIGRAM(S): at 13:13

## 2025-01-20 RX ADMIN — ACETAMINOPHEN 1000 MILLIGRAM(S): 160 SUSPENSION ORAL at 14:02

## 2025-01-20 RX ADMIN — LIDOCAINE HYDROCHLORIDE 1 PATCH: 30 CREAM TOPICAL at 19:04

## 2025-01-20 RX ADMIN — ACETAMINOPHEN 1000 MILLIGRAM(S): 160 SUSPENSION ORAL at 22:49

## 2025-01-20 RX ADMIN — TRAMADOL HYDROCHLORIDE 100 MILLIGRAM(S): 100 TABLET, EXTENDED RELEASE ORAL at 19:05

## 2025-01-20 RX ADMIN — Medication 100 MILLIGRAM(S): at 23:52

## 2025-01-20 RX ADMIN — ONDANSETRON 4 MILLIGRAM(S): 4 TABLET, ORALLY DISINTEGRATING ORAL at 21:20

## 2025-01-20 RX ADMIN — GABAPENTIN 300 MILLIGRAM(S): 800 TABLET ORAL at 23:52

## 2025-01-20 RX ADMIN — GABAPENTIN 300 MILLIGRAM(S): 800 TABLET ORAL at 09:48

## 2025-01-20 RX ADMIN — Medication 100 MILLIGRAM(S): at 07:26

## 2025-01-20 RX ADMIN — Medication 1 DROP(S): at 13:16

## 2025-01-20 RX ADMIN — GABAPENTIN 200 MILLIGRAM(S): 800 TABLET ORAL at 16:01

## 2025-01-20 NOTE — PROGRESS NOTE ADULT - SUBJECTIVE AND OBJECTIVE BOX
Patient is a 74y old  Female who presents with a chief complaint of pleural effusion (19 Jan 2025 09:50)    Subjective:  Patient seen at bedside.  Patient continues with sob.  No overnight events    MEDICATIONS  (STANDING):  artificial tears (preservative free) Ophthalmic Solution 1 Drop(s) Both EYES three times a day  benzonatate 100 milliGRAM(s) Oral three times a day  enoxaparin Injectable 60 milliGRAM(s) SubCutaneous every 24 hours  famotidine    Tablet 20 milliGRAM(s) Oral at bedtime  gabapentin 300 milliGRAM(s) Oral <User Schedule>  gabapentin 200 milliGRAM(s) Oral <User Schedule>  influenza  Vaccine (HIGH DOSE) 0.5 milliLiter(s) IntraMuscular once  lidocaine   4% Patch 1 Patch Transdermal daily  pantoprazole    Tablet 40 milliGRAM(s) Oral before breakfast  senna 2 Tablet(s) Oral at bedtime    MEDICATIONS  (PRN):  aluminum hydroxide/magnesium hydroxide/simethicone Suspension 30 milliLiter(s) Oral every 4 hours PRN Dyspepsia  melatonin 3 milliGRAM(s) Oral at bedtime PRN Insomnia  ondansetron   Disintegrating Tablet 4 milliGRAM(s) Oral every 6 hours PRN Nausea and/or Vomiting  ondansetron Injectable 4 milliGRAM(s) IV Push every 8 hours PRN Nausea and/or Vomiting  sodium chloride 0.65% Nasal 1 Spray(s) Both Nostrils four times a day PRN Nasal Congestion  traMADol 100 milliGRAM(s) Oral every 8 hours PRN Moderate Pain (4 - 6)      ROS  No fever, sweats, chills  No epistaxis, HA, sore throat  No CP, SOB, cough, sputum  No n/v/d, abd pain, melena, hematochezia  No edema  No rash  No anxiety  No back pain, joint pain  No bleeding, bruising  No dysuria, hematuria    Vital Signs Last 24 Hrs  T(C): 36.6 (20 Jan 2025 10:22), Max: 37.2 (19 Jan 2025 21:40)  T(F): 97.9 (20 Jan 2025 10:22), Max: 98.9 (19 Jan 2025 21:40)  HR: 106 (20 Jan 2025 10:22) (90 - 106)  BP: 124/66 (20 Jan 2025 10:22) (124/63 - 133/55)  BP(mean): --  RR: 18 (20 Jan 2025 10:22) (18 - 18)  SpO2: 97% (20 Jan 2025 10:22) (95% - 100%)    Parameters below as of 20 Jan 2025 07:15  Patient On (Oxygen Delivery Method): nasal cannula      PE  NAD  Awake, alert  Anicteric, MMM  RRR  CTAB  Abd soft, NT, ND  No c/c/e  No rash grossly  FROM                                             8.9    5.96  )-----------( 214      ( 20 Jan 2025 05:03 )             29.8   01-20    137  |  101  |  13  ----------------------------<  91  4.1   |  24  |  0.58    Ca    9.3      20 Jan 2025 05:03  Phos  2.9     01-20  Mg     1.90     01-20

## 2025-01-20 NOTE — PROGRESS NOTE ADULT - ASSESSMENT
74 year old female with history of recurrent rectal cancer admitted for dypnea with right pleural effusion    1. Recurrent metastatic rectal cancer  ·	[place] [facility]   ·	recent progression on FOLFOX  ·	planning to switch to lonsurf and bevacizumab  ·	s/p palliative radiation therapy to the right rib area as well as spine.    ·	no disease directed therapy as inpatient  ·	patient to follow with [facility] upon discharge    2.  Dyspnea  ·	Patient has loculated right pleural effusion  ·	Appreciate CT surgery recs  ·	IR to do yohan on 1/21    Will cont to follow

## 2025-01-20 NOTE — PROGRESS NOTE ADULT - SUBJECTIVE AND OBJECTIVE BOX
SUBJECTIVE/ OVERNIGHT EVENTS:  --- Coverage for Dr. Alex ---  Pt seen and examined at bedside.   No overnight event.  Feeling better.  no cp, no sob, no n/v/d.   await IR thoracentesis       --------------------------------------------------------------------------------------------  LABS:                        8.9    5.96  )-----------( 214      ( 20 Jan 2025 05:03 )             29.8     01-20    137  |  101  |  13  ----------------------------<  91  4.1   |  24  |  0.58    Ca    9.3      20 Jan 2025 05:03  Phos  2.9     01-20  Mg     1.90     01-20        CAPILLARY BLOOD GLUCOSE            Urinalysis Basic - ( 20 Jan 2025 05:03 )    Color: x / Appearance: x / SG: x / pH: x  Gluc: 91 mg/dL / Ketone: x  / Bili: x / Urobili: x   Blood: x / Protein: x / Nitrite: x   Leuk Esterase: x / RBC: x / WBC x   Sq Epi: x / Non Sq Epi: x / Bacteria: x        RADIOLOGY & ADDITIONAL TESTS:    Imaging Personally Reviewed:  [x] YES  [ ] NO    Consultant(s) Notes Reviewed:  [x] YES  [ ] NO    MEDICATIONS  (STANDING):  artificial tears (preservative free) Ophthalmic Solution 1 Drop(s) Both EYES three times a day  benzonatate 100 milliGRAM(s) Oral three times a day  famotidine    Tablet 20 milliGRAM(s) Oral at bedtime  gabapentin 300 milliGRAM(s) Oral <User Schedule>  gabapentin 200 milliGRAM(s) Oral <User Schedule>  influenza  Vaccine (HIGH DOSE) 0.5 milliLiter(s) IntraMuscular once  lidocaine   4% Patch 1 Patch Transdermal daily  pantoprazole    Tablet 40 milliGRAM(s) Oral before breakfast  senna 2 Tablet(s) Oral at bedtime    MEDICATIONS  (PRN):  acetaminophen     Tablet .. 1000 milliGRAM(s) Oral every 8 hours PRN Mild Pain (1 - 3), Moderate Pain (4 - 6), Severe Pain (7 - 10)  aluminum hydroxide/magnesium hydroxide/simethicone Suspension 30 milliLiter(s) Oral every 4 hours PRN Dyspepsia  melatonin 3 milliGRAM(s) Oral at bedtime PRN Insomnia  ondansetron   Disintegrating Tablet 4 milliGRAM(s) Oral every 6 hours PRN Nausea and/or Vomiting  ondansetron Injectable 4 milliGRAM(s) IV Push every 8 hours PRN Nausea and/or Vomiting  sodium chloride 0.65% Nasal 1 Spray(s) Both Nostrils four times a day PRN Nasal Congestion  traMADol 100 milliGRAM(s) Oral every 8 hours PRN Moderate Pain (4 - 6)      Care Discussed with Consultants/Other Providers [x] YES  [ ] NO    Vital Signs Last 24 Hrs  T(C): 36.3 (20 Jan 2025 17:55), Max: 37.2 (19 Jan 2025 21:40)  T(F): 97.3 (20 Jan 2025 17:55), Max: 98.9 (19 Jan 2025 21:40)  HR: 106 (20 Jan 2025 17:55) (90 - 106)  BP: 144/73 (20 Jan 2025 17:55) (124/66 - 144/73)  BP(mean): --  RR: 18 (20 Jan 2025 17:55) (18 - 18)  SpO2: 100% (20 Jan 2025 17:55) (96% - 100%)    Parameters below as of 20 Jan 2025 17:55  Patient On (Oxygen Delivery Method): nasal cannula  O2 Flow (L/min): 2    I&O's Summary          PHYSICAL EXAM:  GENERAL: NAD, thin-elderly, comfortable on room air  HEAD:  Atraumatic, Normocephalic  EYES: EOMI, PERRLA, conjunctiva and sclera clear  NECK: Supple, No JVD  CHEST/LUNG: mild decrease breath sounds bilaterally; No wheeze   HEART: Regular rate and rhythm; No murmurs, rubs, or gallops  ABDOMEN: Soft, Nontender, Nondistended; Bowel sounds present  Neuro: AAOx3, no focal weakness   EXTREMITIES:  2+ Peripheral Pulses, No clubbing, cyanosis, or edema  SKIN: No rashes or lesions

## 2025-01-20 NOTE — PROGRESS NOTE ADULT - SUBJECTIVE AND OBJECTIVE BOX
Patient seen and examined at bedside by Thoracic team.  In chair, on 2L O2 via NC for comfort despite appropriate O2 saturations.  Patient admit to persistent SOB.  Pt informed that she will likely need an IR PTC/drainage and may need to stay for MIST protocol.  Became tearful at bedside, wants to go home and is concerned about her pets.  Per primary, IR planning drainage/PTC tomorrow, 1/21/25.    Vital Signs Last 24 Hrs  T(C): 36.6 (20 Jan 2025 10:22), Max: 37.2 (19 Jan 2025 21:40)  T(F): 97.9 (20 Jan 2025 10:22), Max: 98.9 (19 Jan 2025 21:40)  HR: 106 (20 Jan 2025 10:22) (90 - 106)  BP: 124/66 (20 Jan 2025 10:22) (124/63 - 133/55)  BP(mean): --  RR: 18 (20 Jan 2025 10:22) (18 - 18)  SpO2: 97% (20 Jan 2025 10:22) (95% - 100%)    Parameters below as of 20 Jan 2025 07:15  Patient On (Oxygen Delivery Method): nasal cannula        General: A&Ox3, NAD  HEENT: Normocephalic. Vision and hearing grossly intact, EOMI. Airway grossly patent, no stridor.  CV: RRR, well perfused  Resp: Breathing comfortably on 2L via NC for comfort, no resp distress, no accessory muscle use  GI: Soft, NT, ND  MSK: ROMEO x4  Pysch: Appropriate affect                          8.9    5.96  )-----------( 214      ( 20 Jan 2025 05:03 )             29.8       01-20    137  |  101  |  13  ----------------------------<  91  4.1   |  24  |  0.58    Ca    9.3      20 Jan 2025 05:03  Phos  2.9     01-20  Mg     1.90     01-20    A/P:  74y F w PMHx Rectal cancer with local pelvic recurrence, metastases to lungs /bone and recurrent loculated R effusion.   1/20: frustrated by hospital course, agree w/ IR drainage of loculated effusion    Plan:   - Patient resting comfortably on 2L NC   - Effusion with multiple pockets, loculated   - IR to drain effusion tomorrow, 1/21/25  - May need MIST depending on IR drainage.  - Thoracic to follow, call with concerns.    Thoracic surgery   [id number]

## 2025-01-21 ENCOUNTER — RESULT REVIEW (OUTPATIENT)
Age: 75
End: 2025-01-21

## 2025-01-21 LAB
ALBUMIN FLD-MCNC: 2.5 G/DL — SIGNIFICANT CHANGE UP
ANION GAP SERPL CALC-SCNC: 11 MMOL/L — SIGNIFICANT CHANGE UP (ref 7–14)
APTT BLD: 29.3 SEC — SIGNIFICANT CHANGE UP (ref 24.5–35.6)
B PERT IGG+IGM PNL SER: ABNORMAL
BLD GP AB SCN SERPL QL: NEGATIVE — SIGNIFICANT CHANGE UP
BUN SERPL-MCNC: 13 MG/DL — SIGNIFICANT CHANGE UP (ref 7–23)
CALCIUM SERPL-MCNC: 9.6 MG/DL — SIGNIFICANT CHANGE UP (ref 8.4–10.5)
CHLORIDE SERPL-SCNC: 97 MMOL/L — LOW (ref 98–107)
CO2 SERPL-SCNC: 26 MMOL/L — SIGNIFICANT CHANGE UP (ref 22–31)
COLOR FLD: YELLOW
CREAT SERPL-MCNC: 0.59 MG/DL — SIGNIFICANT CHANGE UP (ref 0.5–1.3)
EGFR: 96 ML/MIN/1.73M2 — SIGNIFICANT CHANGE UP
EOSINOPHIL # FLD: 2 % — SIGNIFICANT CHANGE UP
FLUID INTAKE SUBSTANCE CLASS: SIGNIFICANT CHANGE UP
FOLATE+VIT B12 SERBLD-IMP: 0 % — SIGNIFICANT CHANGE UP
GLUCOSE FLD-MCNC: 74 MG/DL — SIGNIFICANT CHANGE UP
GLUCOSE SERPL-MCNC: 91 MG/DL — SIGNIFICANT CHANGE UP (ref 70–99)
HCT VFR BLD CALC: 31.8 % — LOW (ref 34.5–45)
HGB BLD-MCNC: 9.2 G/DL — LOW (ref 11.5–15.5)
INR BLD: 1.04 RATIO — SIGNIFICANT CHANGE UP (ref 0.85–1.16)
LDH SERPL L TO P-CCNC: 138 U/L — SIGNIFICANT CHANGE UP
LYMPHOCYTES # FLD: 38 % — SIGNIFICANT CHANGE UP
MAGNESIUM SERPL-MCNC: 1.8 MG/DL — SIGNIFICANT CHANGE UP (ref 1.6–2.6)
MCHC RBC-ENTMCNC: 28.1 PG — SIGNIFICANT CHANGE UP (ref 27–34)
MCHC RBC-ENTMCNC: 28.9 G/DL — LOW (ref 32–36)
MCV RBC AUTO: 97.2 FL — SIGNIFICANT CHANGE UP (ref 80–100)
MESOTHL CELL # FLD: 0 % — SIGNIFICANT CHANGE UP
MONOS+MACROS # FLD: 53 % — SIGNIFICANT CHANGE UP
NEUTROPHILS-BODY FLUID: 7 % — SIGNIFICANT CHANGE UP
NRBC # BLD AUTO: 0 K/UL — SIGNIFICANT CHANGE UP (ref 0–0)
NRBC # BLD: 0 /100 WBCS — SIGNIFICANT CHANGE UP (ref 0–0)
NRBC # FLD: 0 K/UL — SIGNIFICANT CHANGE UP (ref 0–0)
NRBC BLD-RTO: 0 /100 WBCS — SIGNIFICANT CHANGE UP (ref 0–0)
OTHER CELLS FLD MANUAL: 0 % — SIGNIFICANT CHANGE UP
PHOSPHATE SERPL-MCNC: 2.7 MG/DL — SIGNIFICANT CHANGE UP (ref 2.5–4.5)
PLATELET # BLD AUTO: 211 K/UL — SIGNIFICANT CHANGE UP (ref 150–400)
POTASSIUM SERPL-MCNC: 4.2 MMOL/L — SIGNIFICANT CHANGE UP (ref 3.5–5.3)
POTASSIUM SERPL-SCNC: 4.2 MMOL/L — SIGNIFICANT CHANGE UP (ref 3.5–5.3)
PROT FLD-MCNC: 4.5 G/DL — SIGNIFICANT CHANGE UP
PROTHROM AB SERPL-ACNC: 12.1 SEC — SIGNIFICANT CHANGE UP (ref 9.9–13.4)
RBC # BLD: 3.27 M/UL — LOW (ref 3.8–5.2)
RBC # FLD: 16.8 % — HIGH (ref 10.3–14.5)
RCV VOL RI: <2000 CELLS/UL — HIGH (ref 0–5)
RH IG SCN BLD-IMP: POSITIVE — SIGNIFICANT CHANGE UP
SODIUM SERPL-SCNC: 134 MMOL/L — LOW (ref 135–145)
SPECIMEN SOURCE FLD: SIGNIFICANT CHANGE UP
TOTAL CELLS COUNTED, BODY FLUID: 100 CELLS — SIGNIFICANT CHANGE UP
TOTAL NUCLEATED CELL COUNT, BODY FLUID: 90 CELLS/UL — HIGH (ref 0–5)
TUBE TYPE: SIGNIFICANT CHANGE UP
WBC # BLD: 6.15 K/UL — SIGNIFICANT CHANGE UP (ref 3.8–10.5)
WBC # FLD AUTO: 6.15 K/UL — SIGNIFICANT CHANGE UP (ref 3.8–10.5)

## 2025-01-21 PROCEDURE — 32557 INSERT CATH PLEURA W/ IMAGE: CPT | Mod: RT

## 2025-01-21 PROCEDURE — 88341 IMHCHEM/IMCYTCHM EA ADD ANTB: CPT | Mod: 26

## 2025-01-21 PROCEDURE — 88342 IMHCHEM/IMCYTCHM 1ST ANTB: CPT | Mod: 26

## 2025-01-21 RX ORDER — ACETAMINOPHEN 160 MG/5ML
1000 SUSPENSION ORAL ONCE
Refills: 0 | Status: COMPLETED | OUTPATIENT
Start: 2025-01-21 | End: 2025-01-21

## 2025-01-21 RX ORDER — ACETAMINOPHEN 160 MG/5ML
750 SUSPENSION ORAL ONCE
Refills: 0 | Status: DISCONTINUED | OUTPATIENT
Start: 2025-01-21 | End: 2025-01-21

## 2025-01-21 RX ADMIN — TRAMADOL HYDROCHLORIDE 100 MILLIGRAM(S): 100 TABLET, EXTENDED RELEASE ORAL at 13:20

## 2025-01-21 RX ADMIN — Medication 100 MILLIGRAM(S): at 13:15

## 2025-01-21 RX ADMIN — Medication 100 MILLIGRAM(S): at 05:05

## 2025-01-21 RX ADMIN — Medication 1 DROP(S): at 21:59

## 2025-01-21 RX ADMIN — GABAPENTIN 300 MILLIGRAM(S): 800 TABLET ORAL at 10:52

## 2025-01-21 RX ADMIN — LIDOCAINE HYDROCHLORIDE 1 PATCH: 30 CREAM TOPICAL at 02:42

## 2025-01-21 RX ADMIN — ACETAMINOPHEN 400 MILLIGRAM(S): 160 SUSPENSION ORAL at 16:29

## 2025-01-21 RX ADMIN — TRAMADOL HYDROCHLORIDE 100 MILLIGRAM(S): 100 TABLET, EXTENDED RELEASE ORAL at 21:55

## 2025-01-21 RX ADMIN — GABAPENTIN 200 MILLIGRAM(S): 800 TABLET ORAL at 15:32

## 2025-01-21 RX ADMIN — LIDOCAINE HYDROCHLORIDE 1 PATCH: 30 CREAM TOPICAL at 02:15

## 2025-01-21 RX ADMIN — Medication 1 DROP(S): at 05:05

## 2025-01-21 RX ADMIN — PANTOPRAZOLE 40 MILLIGRAM(S): 20 TABLET, DELAYED RELEASE ORAL at 05:05

## 2025-01-21 RX ADMIN — Medication 1 DROP(S): at 13:15

## 2025-01-21 RX ADMIN — ACETAMINOPHEN 400 MILLIGRAM(S): 160 SUSPENSION ORAL at 23:42

## 2025-01-21 RX ADMIN — TRAMADOL HYDROCHLORIDE 100 MILLIGRAM(S): 100 TABLET, EXTENDED RELEASE ORAL at 14:20

## 2025-01-21 RX ADMIN — LIDOCAINE HYDROCHLORIDE 1 PATCH: 30 CREAM TOPICAL at 10:57

## 2025-01-21 RX ADMIN — TRAMADOL HYDROCHLORIDE 100 MILLIGRAM(S): 100 TABLET, EXTENDED RELEASE ORAL at 05:05

## 2025-01-21 RX ADMIN — ACETAMINOPHEN 1000 MILLIGRAM(S): 160 SUSPENSION ORAL at 08:53

## 2025-01-21 RX ADMIN — GABAPENTIN 300 MILLIGRAM(S): 800 TABLET ORAL at 23:54

## 2025-01-21 RX ADMIN — TRAMADOL HYDROCHLORIDE 100 MILLIGRAM(S): 100 TABLET, EXTENDED RELEASE ORAL at 22:55

## 2025-01-21 RX ADMIN — FAMOTIDINE 20 MILLIGRAM(S): 10 INJECTION INTRAVENOUS at 23:42

## 2025-01-21 RX ADMIN — TRAMADOL HYDROCHLORIDE 100 MILLIGRAM(S): 100 TABLET, EXTENDED RELEASE ORAL at 04:31

## 2025-01-21 RX ADMIN — Medication 100 MILLIGRAM(S): at 21:56

## 2025-01-21 NOTE — CHART NOTE - NSCHARTNOTEFT_GEN_A_CORE
IR Procedure Note    Patient Age:   Patient Gender: female   Procedure (including site / side if known): chest tube placement   Diagnosis/Indication: recurrent loculated R effusion.   Interventional Radiology Attending Physician:   Ordering Attending Physician:  Dr. Young    Pertinent medical history:  74F with rectal cancer with bony and lung metastases s/p RT currently on chemotherapy, 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 IR consulted for chest tube for pleural effusion    Pertinent labs:     LABS:                        9.2    6.15  )-----------( 211      ( 21 Jan 2025 05:54 )             31.8     01-21    134[L]  |  97[L]  |  13  ----------------------------<  91  4.2   |  26  |  0.59    Ca    9.6      21 Jan 2025 05:54  Phos  2.7     01-21  Mg     1.80     01-21      PT/INR - ( 21 Jan 2025 05:54 )   PT: 12.1 sec;   INR: 1.04 ratio         PTT - ( 21 Jan 2025 05:54 )  PTT:29.3 sec  Urinalysis Basic - ( 21 Jan 2025 05:54 )    Color: x / Appearance: x / SG: x / pH: x  Gluc: 91 mg/dL / Ketone: x  / Bili: x / Urobili: x   Blood: x / Protein: x / Nitrite: x   Leuk Esterase: x / RBC: x / WBC x   Sq Epi: x / Non Sq Epi: x / Bacteria: x          Patient and family aware?  [x] Yes    [ ] No

## 2025-01-21 NOTE — PROGRESS NOTE ADULT - SUBJECTIVE AND OBJECTIVE BOX
SUBJECTIVE/ OVERNIGHT EVENTS:  --- Coverage for Dr. Alex ---  No overnight event.  No complaints.  Chest pain free. no SOB, no N/V/D.  Denied HA/dizziness, abdominal pain.         --------------------------------------------------------------------------------------------  LABS:                        9.2    6.15  )-----------( 211      ( 21 Jan 2025 05:54 )             31.8     01-21    134[L]  |  97[L]  |  13  ----------------------------<  91  4.2   |  26  |  0.59    Ca    9.6      21 Jan 2025 05:54  Phos  2.7     01-21  Mg     1.80     01-21      PT/INR - ( 21 Jan 2025 05:54 )   PT: 12.1 sec;   INR: 1.04 ratio         PTT - ( 21 Jan 2025 05:54 )  PTT:29.3 sec  CAPILLARY BLOOD GLUCOSE            Urinalysis Basic - ( 21 Jan 2025 05:54 )    Color: x / Appearance: x / SG: x / pH: x  Gluc: 91 mg/dL / Ketone: x  / Bili: x / Urobili: x   Blood: x / Protein: x / Nitrite: x   Leuk Esterase: x / RBC: x / WBC x   Sq Epi: x / Non Sq Epi: x / Bacteria: x        RADIOLOGY & ADDITIONAL TESTS:    Imaging Personally Reviewed:  [x] YES  [ ] NO    Consultant(s) Notes Reviewed:  [x] YES  [ ] NO    MEDICATIONS  (STANDING):  artificial tears (preservative free) Ophthalmic Solution 1 Drop(s) Both EYES three times a day  benzonatate 100 milliGRAM(s) Oral three times a day  famotidine    Tablet 20 milliGRAM(s) Oral at bedtime  gabapentin 300 milliGRAM(s) Oral <User Schedule>  gabapentin 200 milliGRAM(s) Oral <User Schedule>  influenza  Vaccine (HIGH DOSE) 0.5 milliLiter(s) IntraMuscular once  lidocaine   4% Patch 1 Patch Transdermal daily  pantoprazole    Tablet 40 milliGRAM(s) Oral before breakfast  senna 2 Tablet(s) Oral at bedtime    MEDICATIONS  (PRN):  acetaminophen     Tablet .. 1000 milliGRAM(s) Oral every 8 hours PRN Mild Pain (1 - 3), Moderate Pain (4 - 6), Severe Pain (7 - 10)  aluminum hydroxide/magnesium hydroxide/simethicone Suspension 30 milliLiter(s) Oral every 4 hours PRN Dyspepsia  melatonin 3 milliGRAM(s) Oral at bedtime PRN Insomnia  ondansetron   Disintegrating Tablet 4 milliGRAM(s) Oral every 6 hours PRN Nausea and/or Vomiting  ondansetron Injectable 4 milliGRAM(s) IV Push every 8 hours PRN Nausea and/or Vomiting  sodium chloride 0.65% Nasal 1 Spray(s) Both Nostrils four times a day PRN Nasal Congestion  traMADol 100 milliGRAM(s) Oral every 8 hours PRN Moderate Pain (4 - 6)      Care Discussed with Consultants/Other Providers [x] YES  [ ] NO    Vital Signs Last 24 Hrs  T(C): 37 (21 Jan 2025 16:36), Max: 37.1 (21 Jan 2025 10:18)  T(F): 98.6 (21 Jan 2025 16:36), Max: 98.7 (21 Jan 2025 10:18)  HR: 105 (21 Jan 2025 16:36) (96 - 105)  BP: 117/68 (21 Jan 2025 16:36) (117/68 - 152/68)  BP(mean): 88 (21 Jan 2025 05:00) (83 - 88)  RR: 18 (21 Jan 2025 16:36) (18 - 18)  SpO2: 94% (21 Jan 2025 16:36) (94% - 95%)    Parameters below as of 21 Jan 2025 16:36  Patient On (Oxygen Delivery Method): room air  O2 Flow (L/min): 2    I&O's Summary          PHYSICAL EXAM:  GENERAL: NAD, thin-elderly, comfortable on room air  HEAD:  Atraumatic, Normocephalic  EYES: EOMI, PERRLA, conjunctiva and sclera clear  NECK: Supple, No JVD  CHEST/LUNG: mild decrease breath sounds bilaterally; No wheeze   HEART: Regular rate and rhythm; No murmurs, rubs, or gallops  ABDOMEN: Soft, Nontender, Nondistended; Bowel sounds present  Neuro: AAOx3, no focal weakness   EXTREMITIES:  2+ Peripheral Pulses, No clubbing, cyanosis, or edema  SKIN: No rashes or lesions

## 2025-01-21 NOTE — CHART NOTE - NSCHARTNOTEFT_GEN_A_CORE
Discussed with  IR attending post procedure, of Right chest tube insertion who was paged to call back @ [phone].   Recommended to resume patient's therapeutic Lovenox after 48 hours post procedure per IR which should be after 1/23 /25  8 pm

## 2025-01-21 NOTE — PROGRESS NOTE ADULT - ASSESSMENT
74 year old female with history of recurrent rectal cancer admitted for dyspnea with right pleural effusion    1. Recurrent metastatic rectal cancer  ·	Patient follows with [facility]   ·	recent progression on FOLFOX  ·	planning to switch to lonsurf and bevacizumab  ·	s/p palliative radiation therapy to the right rib area as well as spine.    ·	no disease directed therapy as inpatient  ·	patient to follow with [facility] upon discharge    2.  Dyspnea  ·	Patient has loculated right pleural effusion  ·	Appreciate CT surgery recs  ·	Pending IR yohan today, possibly MIST    Will cont to follow,    Charito Wilson NP  Hematology/Oncology  [facility]  [phone] (office)  Evenings and weekends, please call MD on call or office.

## 2025-01-21 NOTE — PROCEDURE NOTE - PROCEDURE FINDINGS AND DETAILS
CT guided right 10 Fr chest tube inserted. Appx 360cc clear yellow fluid aspirated. Drain placed to Pleur Evac.

## 2025-01-21 NOTE — PROGRESS NOTE ADULT - SUBJECTIVE AND OBJECTIVE BOX
Patient is a 74y old  Female who presents with a chief complaint of pleural effusion (19 Jan 2025 09:50)    Subjective:  Patient seen at bedside.  Patient continues with sob.  No overnight events.    MEDICATIONS  (STANDING):  artificial tears (preservative free) Ophthalmic Solution 1 Drop(s) Both EYES three times a day  benzonatate 100 milliGRAM(s) Oral three times a day  enoxaparin Injectable 60 milliGRAM(s) SubCutaneous every 24 hours  famotidine    Tablet 20 milliGRAM(s) Oral at bedtime  gabapentin 300 milliGRAM(s) Oral <User Schedule>  gabapentin 200 milliGRAM(s) Oral <User Schedule>  influenza  Vaccine (HIGH DOSE) 0.5 milliLiter(s) IntraMuscular once  lidocaine   4% Patch 1 Patch Transdermal daily  pantoprazole    Tablet 40 milliGRAM(s) Oral before breakfast  senna 2 Tablet(s) Oral at bedtime    MEDICATIONS  (PRN):  aluminum hydroxide/magnesium hydroxide/simethicone Suspension 30 milliLiter(s) Oral every 4 hours PRN Dyspepsia  melatonin 3 milliGRAM(s) Oral at bedtime PRN Insomnia  ondansetron   Disintegrating Tablet 4 milliGRAM(s) Oral every 6 hours PRN Nausea and/or Vomiting  ondansetron Injectable 4 milliGRAM(s) IV Push every 8 hours PRN Nausea and/or Vomiting  sodium chloride 0.65% Nasal 1 Spray(s) Both Nostrils four times a day PRN Nasal Congestion  traMADol 100 milliGRAM(s) Oral every 8 hours PRN Moderate Pain (4 - 6)      Vital Signs Last 24 Hrs:  21 Jan 2025  10:18)  T(C): 37.1   T(F) 98.7  HR: 96  BP: 136.78  RR: 18  O2 95%        PE  NAD  Awake, alert  Unlabored breathing  No rash grossly                                               9.2    6.15  )-----------( 211      ( 21 Jan 2025 10:18 )             31.8        134  |  97  |  13  ----------------------------<  91  4.2   |  26  |  0.59

## 2025-01-22 LAB
ANION GAP SERPL CALC-SCNC: 10 MMOL/L — SIGNIFICANT CHANGE UP (ref 7–14)
BUN SERPL-MCNC: 16 MG/DL — SIGNIFICANT CHANGE UP (ref 7–23)
CALCIUM SERPL-MCNC: 9.2 MG/DL — SIGNIFICANT CHANGE UP (ref 8.4–10.5)
CHLORIDE SERPL-SCNC: 98 MMOL/L — SIGNIFICANT CHANGE UP (ref 98–107)
CO2 SERPL-SCNC: 26 MMOL/L — SIGNIFICANT CHANGE UP (ref 22–31)
CREAT SERPL-MCNC: 0.68 MG/DL — SIGNIFICANT CHANGE UP (ref 0.5–1.3)
EGFR: 92 ML/MIN/1.73M2 — SIGNIFICANT CHANGE UP
GLUCOSE SERPL-MCNC: 98 MG/DL — SIGNIFICANT CHANGE UP (ref 70–99)
GRAM STN FLD: SIGNIFICANT CHANGE UP
HCT VFR BLD CALC: 29.2 % — LOW (ref 34.5–45)
HGB BLD-MCNC: 9 G/DL — LOW (ref 11.5–15.5)
MAGNESIUM SERPL-MCNC: 1.8 MG/DL — SIGNIFICANT CHANGE UP (ref 1.6–2.6)
MCHC RBC-ENTMCNC: 28.7 PG — SIGNIFICANT CHANGE UP (ref 27–34)
MCHC RBC-ENTMCNC: 30.8 G/DL — LOW (ref 32–36)
MCV RBC AUTO: 93 FL — SIGNIFICANT CHANGE UP (ref 80–100)
NIGHT BLUE STAIN TISS: SIGNIFICANT CHANGE UP
NRBC # BLD AUTO: 0 K/UL — SIGNIFICANT CHANGE UP (ref 0–0)
NRBC # BLD: 0 /100 WBCS — SIGNIFICANT CHANGE UP (ref 0–0)
NRBC # FLD: 0 K/UL — SIGNIFICANT CHANGE UP (ref 0–0)
NRBC BLD-RTO: 0 /100 WBCS — SIGNIFICANT CHANGE UP (ref 0–0)
PH FLD: 7.6 — SIGNIFICANT CHANGE UP
PHOSPHATE SERPL-MCNC: 3.9 MG/DL — SIGNIFICANT CHANGE UP (ref 2.5–4.5)
PLATELET # BLD AUTO: 206 K/UL — SIGNIFICANT CHANGE UP (ref 150–400)
POTASSIUM SERPL-MCNC: 4.1 MMOL/L — SIGNIFICANT CHANGE UP (ref 3.5–5.3)
POTASSIUM SERPL-SCNC: 4.1 MMOL/L — SIGNIFICANT CHANGE UP (ref 3.5–5.3)
RBC # BLD: 3.14 M/UL — LOW (ref 3.8–5.2)
RBC # FLD: 16.9 % — HIGH (ref 10.3–14.5)
SODIUM SERPL-SCNC: 134 MMOL/L — LOW (ref 135–145)
SPECIMEN SOURCE: SIGNIFICANT CHANGE UP
SPECIMEN SOURCE: SIGNIFICANT CHANGE UP
WBC # BLD: 7.24 K/UL — SIGNIFICANT CHANGE UP (ref 3.8–10.5)
WBC # FLD AUTO: 7.24 K/UL — SIGNIFICANT CHANGE UP (ref 3.8–10.5)

## 2025-01-22 PROCEDURE — 71045 X-RAY EXAM CHEST 1 VIEW: CPT | Mod: 26

## 2025-01-22 RX ORDER — DORNASE ALFA 1 MG/ML
5 SOLUTION RESPIRATORY (INHALATION) EVERY 12 HOURS
Refills: 0 | Status: DISCONTINUED | OUTPATIENT
Start: 2025-01-22 | End: 2025-01-27

## 2025-01-22 RX ORDER — BACTERIOSTATIC SODIUM CHLORIDE 0.9 %
30 VIAL (ML) INJECTION EVERY 12 HOURS
Refills: 0 | Status: DISCONTINUED | OUTPATIENT
Start: 2025-01-22 | End: 2025-02-06

## 2025-01-22 RX ORDER — ACETAMINOPHEN 160 MG/5ML
1000 SUSPENSION ORAL ONCE
Refills: 0 | Status: COMPLETED | OUTPATIENT
Start: 2025-01-22 | End: 2025-01-22

## 2025-01-22 RX ORDER — ALTEPLASE 100 MG
10 KIT INTRAVENOUS EVERY 12 HOURS
Refills: 0 | Status: DISCONTINUED | OUTPATIENT
Start: 2025-01-22 | End: 2025-01-27

## 2025-01-22 RX ADMIN — TRAMADOL HYDROCHLORIDE 100 MILLIGRAM(S): 100 TABLET, EXTENDED RELEASE ORAL at 11:34

## 2025-01-22 RX ADMIN — LIDOCAINE HYDROCHLORIDE 1 PATCH: 30 CREAM TOPICAL at 11:34

## 2025-01-22 RX ADMIN — GABAPENTIN 300 MILLIGRAM(S): 800 TABLET ORAL at 22:23

## 2025-01-22 RX ADMIN — TRAMADOL HYDROCHLORIDE 100 MILLIGRAM(S): 100 TABLET, EXTENDED RELEASE ORAL at 12:34

## 2025-01-22 RX ADMIN — GABAPENTIN 200 MILLIGRAM(S): 800 TABLET ORAL at 15:12

## 2025-01-22 RX ADMIN — ACETAMINOPHEN 1000 MILLIGRAM(S): 160 SUSPENSION ORAL at 23:23

## 2025-01-22 RX ADMIN — FAMOTIDINE 20 MILLIGRAM(S): 10 INJECTION INTRAVENOUS at 22:25

## 2025-01-22 RX ADMIN — Medication 1 DROP(S): at 15:12

## 2025-01-22 RX ADMIN — ALTEPLASE 10 MILLIGRAM(S): KIT at 14:30

## 2025-01-22 RX ADMIN — LIDOCAINE HYDROCHLORIDE 1 PATCH: 30 CREAM TOPICAL at 23:42

## 2025-01-22 RX ADMIN — DORNASE ALFA 5 MILLIGRAM(S): 1 SOLUTION RESPIRATORY (INHALATION) at 14:30

## 2025-01-22 RX ADMIN — GABAPENTIN 300 MILLIGRAM(S): 800 TABLET ORAL at 10:02

## 2025-01-22 RX ADMIN — ACETAMINOPHEN 400 MILLIGRAM(S): 160 SUSPENSION ORAL at 07:54

## 2025-01-22 RX ADMIN — ACETAMINOPHEN 1000 MILLIGRAM(S): 160 SUSPENSION ORAL at 22:23

## 2025-01-22 RX ADMIN — ACETAMINOPHEN 1000 MILLIGRAM(S): 160 SUSPENSION ORAL at 00:00

## 2025-01-22 RX ADMIN — Medication 1 DROP(S): at 22:25

## 2025-01-22 RX ADMIN — TRAMADOL HYDROCHLORIDE 100 MILLIGRAM(S): 100 TABLET, EXTENDED RELEASE ORAL at 19:44

## 2025-01-22 RX ADMIN — ACETAMINOPHEN 1000 MILLIGRAM(S): 160 SUSPENSION ORAL at 08:20

## 2025-01-22 RX ADMIN — PANTOPRAZOLE 40 MILLIGRAM(S): 20 TABLET, DELAYED RELEASE ORAL at 10:02

## 2025-01-22 RX ADMIN — Medication 100 MILLIGRAM(S): at 22:24

## 2025-01-22 RX ADMIN — TRAMADOL HYDROCHLORIDE 100 MILLIGRAM(S): 100 TABLET, EXTENDED RELEASE ORAL at 18:44

## 2025-01-22 RX ADMIN — Medication 100 MILLIGRAM(S): at 15:12

## 2025-01-22 RX ADMIN — LIDOCAINE HYDROCHLORIDE 1 PATCH: 30 CREAM TOPICAL at 20:42

## 2025-01-22 NOTE — PROGRESS NOTE ADULT - SUBJECTIVE AND OBJECTIVE BOX
Pt seen. Resting in bed. Family at bedside.   Pt c/o pain throughout Rt side, back. Worse since   PTC. Is frustrated with current pain management, wants  her regiment at [facility] to be followed. No resp distress.     Vital Signs Last 24 Hrs  T(C): 37.1 (22 Jan 2025 09:30), Max: 37.1 (22 Jan 2025 09:30)  T(F): 98.8 (22 Jan 2025 09:30), Max: 98.8 (22 Jan 2025 09:30)  HR: 104 (22 Jan 2025 09:30) (93 - 105)  BP: 140/56 (22 Jan 2025 09:30) (117/68 - 177/77)  BP(mean): --  RR: 18 (22 Jan 2025 09:30) (16 - 26)  SpO2: 97% (22 Jan 2025 09:30) (93% - 97%)    Parameters below as of 22 Jan 2025 09:30  Patient On (Oxygen Delivery Method): room air        A+O x 3  Rt ptc site c/d/i. DRainage serous, clear  Abt 400cc drained in total. On Suction.   No air leak.   CXR slightly improved.   Pleural cult NGTD    A/P: 74y F w PMHx Rectal cancer with local pelvic recurrence, metastases to lungs /bone and recurrent loculated R effusion.   1/20: frustrated by hospital course, agree w/ IR drainage of loculated effusion. S/p placement of Rt PTC by IR    Plan:   -CXR still w effusion. Will begin MIST protocol per Dr. Elizabeth  -Keep PTC on suction.   -Document output accurately on I/O each shift  -Daily CXR   -Consider Palliative consult for symptom management.   -Will cont to follow  Pt in agree to MIST, risk/benefits discussed.   Dr. Alex notified of the above.  Pt seen. Resting in bed. Family at bedside.   Pt c/o pain throughout Rt side, back. Worse since   PTC. Is frustrated with current pain management, wants  [place] [facility] [place]. No resp distress.     Vital Signs Last 24 Hrs  T(C): 37.1 (22 Jan 2025 09:30), Max: 37.1 (22 Jan 2025 09:30)  T(F): 98.8 (22 Jan 2025 09:30), Max: 98.8 (22 Jan 2025 09:30)  HR: 104 (22 Jan 2025 09:30) (93 - 105)  BP: 140/56 (22 Jan 2025 09:30) (117/68 - 177/77)  BP(mean): --  RR: 18 (22 Jan 2025 09:30) (16 - 26)  SpO2: 97% (22 Jan 2025 09:30) (93% - 97%)    Parameters below as of 22 Jan 2025 09:30  Patient On (Oxygen Delivery Method): room air        A+O x 3  Rt ptc site c/d/i. DRainage serous, clear  Abt 400cc drained in total. On Suction.   No air leak.   CXR slightly improved.   Pleural cult NGTD    A/P: 74y F w PMHx Rectal cancer with local pelvic recurrence, metastases to lungs /bone and recurrent loculated R effusion.   1/20: frustrated by hospital course, agree w/ IR drainage of loculated effusion. S/p placement of Rt PTC by IR    Plan:   -CXR still w effusion. Will begin MIST protocol per Dr. Elizabeth  -HOLD anticoagulation while MIST in progress  -Keep PTC on suction.   -Document output accurately on I/O each shift  -Daily CXR   -Consider Palliative consult for symptom management.   -Will cont to follow  Pt in agree to MIST, risk/benefits discussed.   Dr. Alex notified of the above.

## 2025-01-22 NOTE — PROGRESS NOTE ADULT - SUBJECTIVE AND OBJECTIVE BOX
Patient is a 74y old  Female who presents with a chief complaint of pleural effusion (21 Jan 2025 13:29)  s/p Thoracentesis 1/21, 360 cc's removed, await pleural studies and cytology, right chest tube placement    MEDICATIONS  (STANDING):  artificial tears (preservative free) Ophthalmic Solution 1 Drop(s) Both EYES three times a day  benzonatate 100 milliGRAM(s) Oral three times a day  famotidine    Tablet 20 milliGRAM(s) Oral at bedtime  gabapentin 300 milliGRAM(s) Oral <User Schedule>  gabapentin 200 milliGRAM(s) Oral <User Schedule>  influenza  Vaccine (HIGH DOSE) 0.5 milliLiter(s) IntraMuscular once  lidocaine   4% Patch 1 Patch Transdermal daily  pantoprazole    Tablet 40 milliGRAM(s) Oral before breakfast  senna 2 Tablet(s) Oral at bedtime    MEDICATIONS  (PRN):  acetaminophen     Tablet .. 1000 milliGRAM(s) Oral every 8 hours PRN Mild Pain (1 - 3), Moderate Pain (4 - 6), Severe Pain (7 - 10)  aluminum hydroxide/magnesium hydroxide/simethicone Suspension 30 milliLiter(s) Oral every 4 hours PRN Dyspepsia  melatonin 3 milliGRAM(s) Oral at bedtime PRN Insomnia  ondansetron   Disintegrating Tablet 4 milliGRAM(s) Oral every 6 hours PRN Nausea and/or Vomiting  ondansetron Injectable 4 milliGRAM(s) IV Push every 8 hours PRN Nausea and/or Vomiting  sodium chloride 0.65% Nasal 1 Spray(s) Both Nostrils four times a day PRN Nasal Congestion  traMADol 100 milliGRAM(s) Oral every 8 hours PRN Moderate Pain (4 - 6)        Vital Signs Last 24 Hrs  T(C): 36.6 (21 Jan 2025 21:20), Max: 37.1 (21 Jan 2025 10:18)  T(F): 97.9 (21 Jan 2025 21:20), Max: 98.7 (21 Jan 2025 10:18)  HR: 98 (21 Jan 2025 21:20) (93 - 105)  BP: 122/62 (21 Jan 2025 21:20) (117/68 - 177/77)  BP(mean): --  RR: 18 (21 Jan 2025 21:20) (16 - 26)  SpO2: 97% (21 Jan 2025 21:20) (93% - 97%)    Parameters below as of 21 Jan 2025 21:20  Patient On (Oxygen Delivery Method): room air        PE  NAD  Awake, alert  Anicteric, MMM  RRR, CT  CTAB  Abd soft, NT, ND  No c/c/e  No rash grossly                            9.2    6.15  )-----------( 211      ( 21 Jan 2025 05:54 )             31.8       01-21    134[L]  |  97[L]  |  13  ----------------------------<  91  4.2   |  26  |  0.59    Ca    9.6      21 Jan 2025 05:54  Phos  2.7     01-21  Mg     1.80     01-21

## 2025-01-22 NOTE — PROGRESS NOTE ADULT - SUBJECTIVE AND OBJECTIVE BOX
SUBJECTIVE/ OVERNIGHT EVENTS:  --- Coverage for Dr. Alex ---  feels well  s/p chest tube  wants IV tylenol  no cp, no sob, no n/v/d. no abdominal pain.  no headache, no dizziness.   the sister at bedside.         --------------------------------------------------------------------------------------------  LABS:                        9.0    7.24  )-----------( 206      ( 22 Jan 2025 05:04 )             29.2     01-22    134[L]  |  98  |  16  ----------------------------<  98  4.1   |  26  |  0.68    Ca    9.2      22 Jan 2025 05:04  Phos  3.9     01-22  Mg     1.80     01-22      PT/INR - ( 21 Jan 2025 05:54 )   PT: 12.1 sec;   INR: 1.04 ratio         PTT - ( 21 Jan 2025 05:54 )  PTT:29.3 sec  CAPILLARY BLOOD GLUCOSE            Urinalysis Basic - ( 22 Jan 2025 05:04 )    Color: x / Appearance: x / SG: x / pH: x  Gluc: 98 mg/dL / Ketone: x  / Bili: x / Urobili: x   Blood: x / Protein: x / Nitrite: x   Leuk Esterase: x / RBC: x / WBC x   Sq Epi: x / Non Sq Epi: x / Bacteria: x        RADIOLOGY & ADDITIONAL TESTS:    Imaging Personally Reviewed:  [x] YES  [ ] NO    Consultant(s) Notes Reviewed:  [x] YES  [ ] NO    MEDICATIONS  (STANDING):  alteplase  Injectable for Pleural Effusion 10 milliGRAM(s) IntraPleural. every 12 hours  artificial tears (preservative free) Ophthalmic Solution 1 Drop(s) Both EYES three times a day  benzonatate 100 milliGRAM(s) Oral three times a day  dornase lauryn Solution for Pleural Effusion 5 milliGRAM(s) IntraPleural. every 12 hours  famotidine    Tablet 20 milliGRAM(s) Oral at bedtime  gabapentin 300 milliGRAM(s) Oral <User Schedule>  gabapentin 200 milliGRAM(s) Oral <User Schedule>  influenza  Vaccine (HIGH DOSE) 0.5 milliLiter(s) IntraMuscular once  lidocaine   4% Patch 1 Patch Transdermal daily  pantoprazole    Tablet 40 milliGRAM(s) Oral before breakfast  senna 2 Tablet(s) Oral at bedtime  sodium chloride 0.9% Solution for Pleural Effusion 30 milliLiter(s) IntraPleural. every 12 hours    MEDICATIONS  (PRN):  acetaminophen     Tablet .. 1000 milliGRAM(s) Oral every 8 hours PRN Mild Pain (1 - 3), Moderate Pain (4 - 6), Severe Pain (7 - 10)  aluminum hydroxide/magnesium hydroxide/simethicone Suspension 30 milliLiter(s) Oral every 4 hours PRN Dyspepsia  melatonin 3 milliGRAM(s) Oral at bedtime PRN Insomnia  ondansetron   Disintegrating Tablet 4 milliGRAM(s) Oral every 6 hours PRN Nausea and/or Vomiting  ondansetron Injectable 4 milliGRAM(s) IV Push every 8 hours PRN Nausea and/or Vomiting  sodium chloride 0.65% Nasal 1 Spray(s) Both Nostrils four times a day PRN Nasal Congestion  traMADol 100 milliGRAM(s) Oral every 8 hours PRN Moderate Pain (4 - 6)      Care Discussed with Consultants/Other Providers [x] YES  [ ] NO    Vital Signs Last 24 Hrs  T(C): 37.1 (22 Jan 2025 16:20), Max: 37.1 (22 Jan 2025 09:30)  T(F): 98.8 (22 Jan 2025 16:20), Max: 98.8 (22 Jan 2025 09:30)  HR: 104 (22 Jan 2025 16:20) (93 - 104)  BP: 123/69 (22 Jan 2025 16:20) (117/72 - 177/77)  BP(mean): --  RR: 18 (22 Jan 2025 16:20) (16 - 26)  SpO2: 98% (22 Jan 2025 16:20) (93% - 98%)    Parameters below as of 22 Jan 2025 16:20  Patient On (Oxygen Delivery Method): room air      I&O's Summary    21 Jan 2025 07:01  -  22 Jan 2025 07:00  --------------------------------------------------------  IN: 0 mL / OUT: 65 mL / NET: -65 mL        PHYSICAL EXAM:  GENERAL: NAD, thin-elderly, comfortable on room air  HEAD:  Atraumatic, Normocephalic  EYES: EOMI, PERRLA, conjunctiva and sclera clear  NECK: Supple, No JVD  CHEST/LUNG: mild decrease breath sounds bilaterally; No wheeze +chest tube  HEART: Regular rate and rhythm; No murmurs, rubs, or gallops  ABDOMEN: Soft, Nontender, Nondistended; Bowel sounds present  Neuro: AAOx3, no focal weakness   EXTREMITIES:  2+ Peripheral Pulses, No clubbing, cyanosis, or edema  SKIN: No rashes or lesions

## 2025-01-22 NOTE — PROGRESS NOTE ADULT - ASSESSMENT
74 year old female with history of recurrent rectal cancer admitted for dyspnea with right pleural effusion    1. Recurrent metastatic rectal cancer  ·	Patient follows with [facility]   ·	recent progression on FOLFOX  ·	planning to switch to lonsurf and bevacizumab  ·	s/p palliative radiation therapy to the right rib area as well as spine.    ·	no disease directed therapy as inpatient  ·	patient to follow with [facility] upon discharge    2.  Dyspnea  ·	Patient has loculated right pleural effusion  ·	Appreciate CT surgery recs  ·	CT guided right 10 Fr chest tube inserted. Appx 360cc clear yellow fluid aspirated. Drain placed to Pleur Evac.  ·	await pleural studies and cytology    Will cont to follow,    Danay Eaton NP  Hematology/Oncology  [facility]  [phone] (Office)  [phone] (Alt office)  Evenings and weekends please call MD on call or office

## 2025-01-22 NOTE — CHART NOTE - NSCHARTNOTEFT_GEN_A_CORE
Informed consent obtained. Under aseptic technique, Alteplase 10mg and Dornase 5mg instilled via right pigtail for fibrinolysis. Sterile flush given before and after. PTC to remain clamped x 1 hr then unclamp and continue to strict suction. Pt tolerated  procedure well.

## 2025-01-23 DIAGNOSIS — Z71.89 OTHER SPECIFIED COUNSELING: ICD-10-CM

## 2025-01-23 DIAGNOSIS — R53.81 OTHER MALAISE: ICD-10-CM

## 2025-01-23 DIAGNOSIS — Z51.5 ENCOUNTER FOR PALLIATIVE CARE: ICD-10-CM

## 2025-01-23 DIAGNOSIS — G89.3 NEOPLASM RELATED PAIN (ACUTE) (CHRONIC): ICD-10-CM

## 2025-01-23 LAB
ANION GAP SERPL CALC-SCNC: 9 MMOL/L — SIGNIFICANT CHANGE UP (ref 7–14)
BUN SERPL-MCNC: 14 MG/DL — SIGNIFICANT CHANGE UP (ref 7–23)
CALCIUM SERPL-MCNC: 9.2 MG/DL — SIGNIFICANT CHANGE UP (ref 8.4–10.5)
CHLORIDE SERPL-SCNC: 97 MMOL/L — LOW (ref 98–107)
CO2 SERPL-SCNC: 27 MMOL/L — SIGNIFICANT CHANGE UP (ref 22–31)
CREAT SERPL-MCNC: 0.63 MG/DL — SIGNIFICANT CHANGE UP (ref 0.5–1.3)
EGFR: 94 ML/MIN/1.73M2 — SIGNIFICANT CHANGE UP
GLUCOSE SERPL-MCNC: 88 MG/DL — SIGNIFICANT CHANGE UP (ref 70–99)
HCT VFR BLD CALC: 30.1 % — LOW (ref 34.5–45)
HGB BLD-MCNC: 9.4 G/DL — LOW (ref 11.5–15.5)
MAGNESIUM SERPL-MCNC: 1.9 MG/DL — SIGNIFICANT CHANGE UP (ref 1.6–2.6)
MCHC RBC-ENTMCNC: 29.2 PG — SIGNIFICANT CHANGE UP (ref 27–34)
MCHC RBC-ENTMCNC: 31.2 G/DL — LOW (ref 32–36)
MCV RBC AUTO: 93.5 FL — SIGNIFICANT CHANGE UP (ref 80–100)
NRBC # BLD AUTO: 0 K/UL — SIGNIFICANT CHANGE UP (ref 0–0)
NRBC # BLD: 0 /100 WBCS — SIGNIFICANT CHANGE UP (ref 0–0)
NRBC # FLD: 0 K/UL — SIGNIFICANT CHANGE UP (ref 0–0)
NRBC BLD-RTO: 0 /100 WBCS — SIGNIFICANT CHANGE UP (ref 0–0)
PHOSPHATE SERPL-MCNC: 3.4 MG/DL — SIGNIFICANT CHANGE UP (ref 2.5–4.5)
PLATELET # BLD AUTO: 188 K/UL — SIGNIFICANT CHANGE UP (ref 150–400)
POTASSIUM SERPL-MCNC: 4.1 MMOL/L — SIGNIFICANT CHANGE UP (ref 3.5–5.3)
POTASSIUM SERPL-SCNC: 4.1 MMOL/L — SIGNIFICANT CHANGE UP (ref 3.5–5.3)
RBC # BLD: 3.22 M/UL — LOW (ref 3.8–5.2)
RBC # FLD: 16.7 % — HIGH (ref 10.3–14.5)
SODIUM SERPL-SCNC: 133 MMOL/L — LOW (ref 135–145)
WBC # BLD: 6.8 K/UL — SIGNIFICANT CHANGE UP (ref 3.8–10.5)
WBC # FLD AUTO: 6.8 K/UL — SIGNIFICANT CHANGE UP (ref 3.8–10.5)

## 2025-01-23 PROCEDURE — 71045 X-RAY EXAM CHEST 1 VIEW: CPT | Mod: 26

## 2025-01-23 PROCEDURE — 99497 ADVNCD CARE PLAN 30 MIN: CPT | Mod: GC,25

## 2025-01-23 PROCEDURE — 99498 ADVNCD CARE PLAN ADDL 30 MIN: CPT | Mod: GC,25

## 2025-01-23 PROCEDURE — 99223 1ST HOSP IP/OBS HIGH 75: CPT | Mod: GC

## 2025-01-23 RX ORDER — POLYETHYLENE GLYCOL 3350 17 G/17G
17 POWDER, FOR SOLUTION ORAL DAILY
Refills: 0 | Status: DISCONTINUED | OUTPATIENT
Start: 2025-01-23 | End: 2025-02-06

## 2025-01-23 RX ORDER — BACTERIOSTATIC SODIUM CHLORIDE 0.9 %
1000 VIAL (ML) INJECTION
Refills: 0 | Status: DISCONTINUED | OUTPATIENT
Start: 2025-01-23 | End: 2025-01-24

## 2025-01-23 RX ORDER — LIDOCAINE HYDROCHLORIDE 30 MG/G
1 CREAM TOPICAL ONCE
Refills: 0 | Status: COMPLETED | OUTPATIENT
Start: 2025-01-23 | End: 2025-01-23

## 2025-01-23 RX ORDER — ACETAMINOPHEN 160 MG/5ML
1000 SUSPENSION ORAL EVERY 8 HOURS
Refills: 0 | Status: DISCONTINUED | OUTPATIENT
Start: 2025-01-23 | End: 2025-01-29

## 2025-01-23 RX ORDER — ACETAMINOPHEN 160 MG/5ML
1000 SUSPENSION ORAL ONCE
Refills: 0 | Status: COMPLETED | OUTPATIENT
Start: 2025-01-23 | End: 2025-01-23

## 2025-01-23 RX ORDER — HEPARIN SODIUM,PORCINE 10000/ML
5000 VIAL (ML) INJECTION EVERY 12 HOURS
Refills: 0 | Status: DISCONTINUED | OUTPATIENT
Start: 2025-01-23 | End: 2025-01-27

## 2025-01-23 RX ORDER — ACETAMINOPHEN 160 MG/5ML
650 SUSPENSION ORAL EVERY 6 HOURS
Refills: 0 | Status: DISCONTINUED | OUTPATIENT
Start: 2025-01-23 | End: 2025-01-23

## 2025-01-23 RX ORDER — MORPHINE SULFATE 60 MG/1
5 TABLET, FILM COATED, EXTENDED RELEASE ORAL EVERY 4 HOURS
Refills: 0 | Status: DISCONTINUED | OUTPATIENT
Start: 2025-01-23 | End: 2025-01-24

## 2025-01-23 RX ORDER — ACETAMINOPHEN 160 MG/5ML
1000 SUSPENSION ORAL ONCE
Refills: 0 | Status: COMPLETED | OUTPATIENT
Start: 2025-01-24 | End: 2025-01-24

## 2025-01-23 RX ADMIN — GABAPENTIN 300 MILLIGRAM(S): 800 TABLET ORAL at 23:11

## 2025-01-23 RX ADMIN — GABAPENTIN 300 MILLIGRAM(S): 800 TABLET ORAL at 10:13

## 2025-01-23 RX ADMIN — ACETAMINOPHEN 1000 MILLIGRAM(S): 160 SUSPENSION ORAL at 06:00

## 2025-01-23 RX ADMIN — DORNASE ALFA 5 MILLIGRAM(S): 1 SOLUTION RESPIRATORY (INHALATION) at 02:21

## 2025-01-23 RX ADMIN — Medication 100 MILLIGRAM(S): at 05:23

## 2025-01-23 RX ADMIN — LIDOCAINE HYDROCHLORIDE 1 PATCH: 30 CREAM TOPICAL at 23:11

## 2025-01-23 RX ADMIN — Medication 100 MILLIGRAM(S): at 23:10

## 2025-01-23 RX ADMIN — PANTOPRAZOLE 40 MILLIGRAM(S): 20 TABLET, DELAYED RELEASE ORAL at 08:19

## 2025-01-23 RX ADMIN — ACETAMINOPHEN 1000 MILLIGRAM(S): 160 SUSPENSION ORAL at 23:18

## 2025-01-23 RX ADMIN — ALTEPLASE 10 MILLIGRAM(S): KIT at 16:21

## 2025-01-23 RX ADMIN — Medication 60 MILLILITER(S): at 20:09

## 2025-01-23 RX ADMIN — MORPHINE SULFATE 5 MILLIGRAM(S): 60 TABLET, FILM COATED, EXTENDED RELEASE ORAL at 19:58

## 2025-01-23 RX ADMIN — ONDANSETRON 4 MILLIGRAM(S): 4 TABLET, ORALLY DISINTEGRATING ORAL at 14:07

## 2025-01-23 RX ADMIN — MORPHINE SULFATE 5 MILLIGRAM(S): 60 TABLET, FILM COATED, EXTENDED RELEASE ORAL at 12:24

## 2025-01-23 RX ADMIN — Medication 1 DROP(S): at 23:09

## 2025-01-23 RX ADMIN — FAMOTIDINE 20 MILLIGRAM(S): 10 INJECTION INTRAVENOUS at 23:10

## 2025-01-23 RX ADMIN — GABAPENTIN 200 MILLIGRAM(S): 800 TABLET ORAL at 20:26

## 2025-01-23 RX ADMIN — ACETAMINOPHEN 400 MILLIGRAM(S): 160 SUSPENSION ORAL at 05:22

## 2025-01-23 RX ADMIN — Medication 1 DROP(S): at 14:08

## 2025-01-23 RX ADMIN — Medication 1 DROP(S): at 07:19

## 2025-01-23 RX ADMIN — Medication 30 MILLILITER(S): at 02:19

## 2025-01-23 RX ADMIN — ALTEPLASE 10 MILLIGRAM(S): KIT at 02:21

## 2025-01-23 NOTE — CONSULT NOTE ADULT - PROBLEM SELECTOR RECOMMENDATION 5
- Patient declines filling out HCP  - Code status: FULL - Symptom management as above  - GaP team will continue to follow  - In the event of newly developing, evolving, or worsening symptoms, please contact the Palliative Medicine team via pager ([phone] #[phone])

## 2025-01-23 NOTE — CONSULT NOTE ADULT - PROBLEM SELECTOR RECOMMENDATION 4
The patient requires nursing assistance with ADLs  - Supportive care.  - Turn and position.  - Continue with good skin care - Patient declines filling out HCP  - Code status: FULL - Patient declines filling out HCP  - Code status: FULL  See GOC note  16 minutes spent on advanced care planning/ goals of care

## 2025-01-23 NOTE — CHART NOTE - NSCHARTNOTEFT_GEN_A_CORE
Patient Demographic Information (PDI)       PDI	First Name	Last Name	Birth Date	Gender	Street Address	[facility]	Zip Code  A	Chloe David	1950	Female	[address] [facility]	[address]  B	Chloe Delarosa	1950	Female	[address] [facility]	[address]    Prescription Information      PDI Filter:    PDI	Current Rx	Drug Type	Rx Written	Rx Dispensed	Drug	Quantity	Days Supply	Prescriber Name	Prescriber ERICA #	Payment Method	Dispenser  A	N	O	11/20/2024	11/22/2024	tramadol hcl 50 mg tablet	180	30	Ruth Noe)	[id number]	Medicare	[id number]  A	N	O	07/11/2024	07/12/2024	tramadol hcl 50 mg tablet	45	15	Goldberg, Zoe MD	[id number]	Medicare	[id number]  B	N	O	01/02/2025	01/02/2025	tramadol hcl 50 mg tablet	112	14	[facility]	[id number]	Insurance	[facility]  B	N	O	04/05/2024	04/05/2024	tramadol hcl 50 mg tablet	20	5	Zoie Neely	[id number]	Insurance	[facility]

## 2025-01-23 NOTE — CONSULT NOTE ADULT - SUBJECTIVE AND OBJECTIVE BOX
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 Pt states at baseline she was ambulating 0980-2586 steps per day and now can barely walk across the room to the bathroom. Does have some associated chest wall pain that also relates to previous RT, which is stable. No current cough, fever, chills. Fairly good appetite and tolerating regular diet. Eager to have effusion drained as she needs to return home to receive medication as part of a clinical trial. (18 Jan 2025 12:40)    Patient seen and examined at bedside, patient reports continuing to have pain, worse in her R chest where she had chest tube placed, currently 2/10, however when she moves pain can be excruciating, reports that it was as bad as 8/10 this morning when she woke up. Pt states that her pain regimen from [facility] is Tylenol 1000mg q8h, tramadol 50mg q8h, and if she has breakthrough pain will take aleve. She admits that pain is not controlled and is not sure if the medications are working.   We discussed starting morphine as an alternative to help manage pain, discussed side effects, risk factors. Advised her that we are available to adjust pain medications. Chart review, in 24hrs 7AM-7AM, patient used Tramadol 100mg x2 and IV tylenol.         PERTINENT PM/SXH:   No pertinent past medical history    Colorectal cancer      No significant past surgical history    S/P colon resection      FAMILY HISTORY:  No pertinent family history in first degree relatives      Family Hx substance abuse [ ]yes [ ]no  ITEMS NOT CHECKED ARE NOT PRESENT    SOCIAL HISTORY:   Significant other/partner[ ]  Children[ ]  Pentecostal/Spirituality:   Substance hx:  [ ]   Tobacco hx:  [ ]   Alcohol hx: [ ]   Home Opioid hx:  [x ] I-Stop Reference No: See ISTOP note  Living Situation: [x ]Home  [ ]Long term care  [ ]Rehab [ ]Other    ADVANCE DIRECTIVES:    DNR/MOLST  [ ]  Living Will  [ ]   DECISION MAKER(s):  [ ] Health Care Proxy(s)  [ ] Surrogate(s)  [ ] Guardian           Name(s): Phone Number(s):    BASELINE (I)ADL(s) (prior to admission):  [place]: [ x]Total  [ ] Moderate [ ]Dependent    Allergies    sulfa drugs (Pruritus)    Intolerances    MEDICATIONS  (STANDING):  acetaminophen     Tablet .. 650 milliGRAM(s) Oral every 6 hours  alteplase  Injectable for Pleural Effusion 10 milliGRAM(s) IntraPleural. every 12 hours  artificial tears (preservative free) Ophthalmic Solution 1 Drop(s) Both EYES three times a day  benzonatate 100 milliGRAM(s) Oral three times a day  dornase lauryn Solution for Pleural Effusion 5 milliGRAM(s) IntraPleural. every 12 hours  famotidine    Tablet 20 milliGRAM(s) Oral at bedtime  gabapentin 300 milliGRAM(s) Oral <User Schedule>  gabapentin 200 milliGRAM(s) Oral <User Schedule>  heparin   Injectable 5000 Unit(s) SubCutaneous every 12 hours  influenza  Vaccine (HIGH DOSE) 0.5 milliLiter(s) IntraMuscular once  lidocaine   4% Patch 1 Patch Transdermal daily  pantoprazole    Tablet 40 milliGRAM(s) Oral before breakfast  senna 2 Tablet(s) Oral at bedtime  sodium chloride 0.9% Solution for Pleural Effusion 30 milliLiter(s) IntraPleural. every 12 hours  sodium chloride 0.9%. 1000 milliLiter(s) (60 mL/Hr) IV Continuous <Continuous>    MEDICATIONS  (PRN):  aluminum hydroxide/magnesium hydroxide/simethicone Suspension 30 milliLiter(s) Oral every 4 hours PRN Dyspepsia  melatonin 3 milliGRAM(s) Oral at bedtime PRN Insomnia  morphine   Solution 5 milliGRAM(s) Oral every 4 hours PRN Severe Pain (7 - 10)  ondansetron   Disintegrating Tablet 4 milliGRAM(s) Oral every 6 hours PRN Nausea and/or Vomiting  ondansetron Injectable 4 milliGRAM(s) IV Push every 8 hours PRN Nausea and/or Vomiting  sodium chloride 0.65% Nasal 1 Spray(s) Both Nostrils four times a day PRN Nasal Congestion    PRESENT SYMPTOMS: [ ]Unable to self-report see CPOT, PAINADs, RDOS  Source if other than patient:  [ ]Family   [ ]Team     Pain: [x ]yes [ ]no  QOL impact - wakes her up from sleep  Location -  R chest wall                  Aggravating factors - movement  Quality - sharp  Radiation - across R chest  Timing- constant  Severity (0-10 scale): can be as severe as 8/10  Minimal acceptable level (0-10 scale): 2/10    PCSSQ [Palliative Care Spiritual Screening Question]   Severity (0-10):  Score of 4 or > indicate consideration of Chaplaincy referral.  Chaplaincy Referral: [ ] yes [x] refused [ ] following    Caregiver [place]? : [ ] yes [ ] no [x ] deferred:  Social work referral [ ] Patient & Family Centered Care Referral [ ]     Anticipatory Grief Present?: [ ] yes [ ] no  [x ] deferred: Social work referral [ ]  Patient & Family Centered Care Referral [ ]       Other Symptoms:  [x ]All other review of systems negative     PHYSICAL EXAM:  Vital Signs Last 24 Hrs  T(C): 36.9 (23 Jan 2025 09:37), Max: 37.1 (22 Jan 2025 16:20)  T(F): 98.4 (23 Jan 2025 09:37), Max: 98.8 (22 Jan 2025 16:20)  HR: 102 (23 Jan 2025 09:37) (89 - 104)  BP: 125/68 (23 Jan 2025 09:37) (122/69 - 133/70)  BP(mean): --  RR: 18 (23 Jan 2025 09:37) (17 - 18)  SpO2: 96% (23 Jan 2025 09:37) (96% - 98%)    Parameters below as of 23 Jan 2025 09:37  Patient On (Oxygen Delivery Method): room air     I&O's Summary    22 Jan 2025 07:01  -  23 Jan 2025 07:00  --------------------------------------------------------  IN: 120 mL / OUT: 200 mL / NET: -80 mL    23 Jan 2025 07:01  -  23 Jan 2025 13:13  --------------------------------------------------------  IN: 0 mL / OUT: 450 mL / NET: -450 mL      GENERAL: [ ]Cachexia    [x ]Alert  [x ]Oriented x 3  [ ]Lethargic  [ ]Unarousable  [x ]Verbal  [ ]Non-Verbal  Behavioral:   [ ] Anxiety  [ ] Delirium [ ] Agitation [ ] Other  HEENT:  [x ]Normal   [ ]Dry mouth   [ ]ET Tube/Trach  [ ]Oral lesions  PULMONARY:   [ ]Clear [ ]Tachypnea  [ ]Audible excessive secretions   [ ]Rhonchi        [ ]Right [ ]Left [ ]Bilateral  [ ]Crackles        [ ]Right [ ]Left [ ]Bilateral  [ ]Wheezing     [ ]Right [ ]Left [ ]Bilateral  [x ]Diminished breath sounds [ x]right [ ]left [ ]bilateral  CARDIOVASCULAR:    [ x]Regular [ ]Irregular [ ]Tachy  [ ]Luther [ ]Murmur [ ]Other  GASTROINTESTINAL:  [ x]Soft  [ ]Distended   [x ]+BS  [ ]Non tender [ ]Tender  [ ]Other [ ]PEG [ ]OGT/ NGT  Last BM: 1/22/25 [x] Colostomy bag LLQ  GENITOURINARY:  [x ]Normal [ ] Incontinent   [ ]Oliguria/Anuria   [ ]Nj  MUSCULOSKELETAL:   [x ]Normal   [ ]Weakness  [ ]Bed/Wheelchair bound [ ]Edema  NEUROLOGIC:   [x ]No focal deficits  [ ]Cognitive impairment  [ ]Dysphagia [ ]Dysarthria [ ]Paresis [ ]Other   SKIN:   [x ]Normal  [ ]Rash  [ ]Other  [ ]Pressure ulcer(s)       Present on admission [ ]y [ ]n    CRITICAL CARE:  [ ] Shock Present  [ ]Septic [ ]Cardiogenic [ ]Neurologic [ ]Hypovolemic  [ ]  Vasopressors [ ]  Inotropes   [ ]Respiratory failure present [ ]Mechanical ventilation [ ]Non-invasive ventilatory support [ ]High flow    [ ]Acute  [ ]Chronic [ ]Hypoxic  [ ]Hypercarbic [ ]Other  [ ]Other organ failure     LABS:                        9.4    6.80  )-----------( 188      ( 23 Jan 2025 05:22 )             30.1   01-23    133[L]  |  97[L]  |  14  ----------------------------<  88  4.1   |  27  |  0.63    Ca    9.2      23 Jan 2025 05:22  Phos  3.4     01-23  Mg     1.90     01-23        Urinalysis Basic - ( 23 Jan 2025 05:22 )    Color: x / Appearance: x / SG: x / pH: x  Gluc: 88 mg/dL / Ketone: x  / Bili: x / Urobili: x   Blood: x / Protein: x / Nitrite: x   Leuk Esterase: x / RBC: x / WBC x   Sq Epi: x / Non Sq Epi: x / Bacteria: x      RADIOLOGY & ADDITIONAL STUDIES:    PROTEIN CALORIE MALNUTRITION PRESENT: [ ]mild [ ]moderate [ ]severe [ ]underweight [ ]morbid obesity  https://www.andeal.org/vault/2440/web/files/ONC/Table_Clinical%20Characteristics%20to%20Document%20Malnutrition-White%20JV%20et%20al%202012.pdf    Height (cm): 165.1 (01-18-25 @ 05:00), 152.4 (10-29-24 @ 23:18)  Weight (kg): 52.2 (01-17-25 @ 15:55), 54.4 (10-29-24 @ 15:36)  BMI (kg/m2): 19.2 (01-18-25 @ 05:00), 22.5 (01-17-25 @ 15:55), 23.4 (10-29-24 @ 23:18)    [ ]PPSV2 < or = to 30% [ ]significant weight loss  [ ]poor nutritional intake  [ ]anasarca[ ]Artificial Nutrition      Other REFERRALS:  [ ]Hospice  [ ]Child Life  [ ]Social Work  [ ]Case management [ ]Holistic Therapy     Care Coordination Assessment 201 [C. 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 Pt states at baseline she was ambulating 2893-6366 steps per day and now can barely walk across the room to the bathroom. Does have some associated chest wall pain that also relates to previous RT, which is stable. No current cough, fever, chills. Fairly good appetite and tolerating regular diet. Eager to have effusion drained as she needs to return home to receive medication as part of a clinical trial. (18 Jan 2025 12:40)    Interval History:   Patient seen and examined at bedside, patient reports continuing to have pain, worse in her R chest where she had chest tube placed, currently 2/10, however when she moves pain can be excruciating, reports that it was as bad as 8/10 this morning when she woke up. Pt states that her pain regimen from [facility] is Tylenol 1000mg q8h, tramadol 50mg q8h, and if she has breakthrough pain will take aleve. She admits that pain is not controlled and is not sure if the medications are working.   We discussed starting morphine as an alternative to help manage pain, discussed side effects, risk factors. Advised her that we are available to adjust pain medications. Chart review, in 24hrs 7AM-7AM, patient used Tramadol 100mg x2 and IV tylenol.     PERTINENT PM/SXH:   No pertinent past medical history  Colorectal cancer  No significant past surgical history  S/P colon resection    FAMILY HISTORY:  No pertinent family history in first degree relatives    ITEMS NOT CHECKED ARE NOT PRESENT    SOCIAL HISTORY:   Significant other/partner[ ]  Children[ ]  Congregation/Spirituality:   Substance hx:  [ ]   Tobacco hx:  [ ]   Alcohol hx: [ ]   Home Opioid hx:  [x ] I-Stop Reference No: See ISTOP note  Living Situation: [x ]Home  [ ]Long term care  [ ]Rehab [ ]Other    ADVANCE DIRECTIVES:    DNR/MOLST  [ ]  Living Will  [ ]   DECISION MAKER(s):  [ ] Health Care Proxy(s)  [x ] Surrogate(s)  [ ] Guardian           Name(s): Phone Number(s):  Sister [place]: [phone]    BASELINE (I)ADL(s) (prior to admission):  [place]: [ x]Total  [ ] Moderate [ ]Dependent    Allergies    sulfa drugs (Pruritus)    Intolerances    MEDICATIONS  (STANDING):  acetaminophen     Tablet .. 650 milliGRAM(s) Oral every 6 hours  alteplase  Injectable for Pleural Effusion 10 milliGRAM(s) IntraPleural. every 12 hours  artificial tears (preservative free) Ophthalmic Solution 1 Drop(s) Both EYES three times a day  benzonatate 100 milliGRAM(s) Oral three times a day  dornase lauryn Solution for Pleural Effusion 5 milliGRAM(s) IntraPleural. every 12 hours  famotidine    Tablet 20 milliGRAM(s) Oral at bedtime  gabapentin 300 milliGRAM(s) Oral <User Schedule>  gabapentin 200 milliGRAM(s) Oral <User Schedule>  heparin   Injectable 5000 Unit(s) SubCutaneous every 12 hours  influenza  Vaccine (HIGH DOSE) 0.5 milliLiter(s) IntraMuscular once  lidocaine   4% Patch 1 Patch Transdermal daily  pantoprazole    Tablet 40 milliGRAM(s) Oral before breakfast  senna 2 Tablet(s) Oral at bedtime  sodium chloride 0.9% Solution for Pleural Effusion 30 milliLiter(s) IntraPleural. every 12 hours  sodium chloride 0.9%. 1000 milliLiter(s) (60 mL/Hr) IV Continuous <Continuous>    MEDICATIONS  (PRN):  aluminum hydroxide/magnesium hydroxide/simethicone Suspension 30 milliLiter(s) Oral every 4 hours PRN Dyspepsia  melatonin 3 milliGRAM(s) Oral at bedtime PRN Insomnia  morphine   Solution 5 milliGRAM(s) Oral every 4 hours PRN Severe Pain (7 - 10)  ondansetron   Disintegrating Tablet 4 milliGRAM(s) Oral every 6 hours PRN Nausea and/or Vomiting  ondansetron Injectable 4 milliGRAM(s) IV Push every 8 hours PRN Nausea and/or Vomiting  sodium chloride 0.65% Nasal 1 Spray(s) Both Nostrils four times a day PRN Nasal Congestion    PRESENT SYMPTOMS: [ ]Unable to self-report see CPOT, PAINADs, RDOS  Source if other than patient:  [ ]Family   [ ]Team     Pain: [x ]yes [ ]no  QOL impact - wakes her up from sleep  Location -  R chest wall                  Aggravating factors - movement  Quality - sharp  Radiation - across R chest  Timing- constant  Severity (0-10 scale): can be as severe as 8/10  Minimal acceptable level (0-10 scale): 2/10    PCSSQ [Palliative Care Spiritual Screening Question]   Severity (0-10):  Score of 4 or > indicate consideration of Chaplaincy referral.  Chaplaincy Referral: [ ] yes [x] refused [ ] following    Caregiver [place]? : [ ] yes [ ] no [x ] deferred:  Social work referral [ ] Patient & Family Centered Care Referral [ ]     Anticipatory Grief Present?: [ ] yes [ ] no  [x ] deferred: Social work referral [ ]  Patient & Family Centered Care Referral [ ]       Other Symptoms:  [x ]All other review of systems negative     PHYSICAL EXAM:  Vital Signs Last 24 Hrs  T(C): 36.9 (23 Jan 2025 09:37), Max: 37.1 (22 Jan 2025 16:20)  T(F): 98.4 (23 Jan 2025 09:37), Max: 98.8 (22 Jan 2025 16:20)  HR: 102 (23 Jan 2025 09:37) (89 - 104)  BP: 125/68 (23 Jan 2025 09:37) (122/69 - 133/70)  BP(mean): --  RR: 18 (23 Jan 2025 09:37) (17 - 18)  SpO2: 96% (23 Jan 2025 09:37) (96% - 98%)    Parameters below as of 23 Jan 2025 09:37  Patient On (Oxygen Delivery Method): room air     I&O's Summary    22 Jan 2025 07:01  -  23 Jan 2025 07:00  --------------------------------------------------------  IN: 120 mL / OUT: 200 mL / NET: -80 mL    23 Jan 2025 07:01  -  23 Jan 2025 13:13  --------------------------------------------------------  IN: 0 mL / OUT: 450 mL / NET: -450 mL      GENERAL: [ ]Cachexia    [x ]Alert  [x ]Oriented x 3  [ ]Lethargic  [ ]Unarousable  [x ]Verbal  [ ]Non-Verbal  Behavioral:   [x ] Anxiety  [ ] Delirium [ ] Agitation [ ] Other  HEENT:  [x ]Normal   [ ]Dry mouth   [ ]ET Tube/Trach  [ ]Oral lesions  PULMONARY:   [ ]Clear [ ]Tachypnea  [ ]Audible excessive secretions   [ ]Rhonchi        [ ]Right [ ]Left [ ]Bilateral  [ ]Crackles        [ ]Right [ ]Left [ ]Bilateral  [ ]Wheezing     [ ]Right [ ]Left [ ]Bilateral  [x ]Diminished breath sounds [ x]right [ ]left [ ]bilateral  CARDIOVASCULAR:    [ x]Regular [ ]Irregular [ ]Tachy  [ ]Luther [ ]Murmur [ ]Other  GASTROINTESTINAL:  [ x]Soft  [ ]Distended   [x ]+BS  [ x]Non tender [ ]Tender  [ ]Other [ ]PEG [ ]OGT/ NGT  Last BM: 1/22/25 [x] Colostomy bag LLQ- stool in colostomy  GENITOURINARY:  [x ]Normal [ ] Incontinent   [ ]Oliguria/Anuria   [ ]Nj  MUSCULOSKELETAL:   [x ]Normal   [ ]Weakness  [ ]Bed/Wheelchair bound [ ]Edema  NEUROLOGIC:   [x ]No focal deficits  [ ]Cognitive impairment  [ ]Dysphagia [ ]Dysarthria [ ]Paresis [ ]Other   SKIN:   [x ]Normal  [ ]Rash  [ ]Other  [ ]Pressure ulcer(s)       Present on admission [ ]y [ ]n    CRITICAL CARE:  [ ] Shock Present  [ ]Septic [ ]Cardiogenic [ ]Neurologic [ ]Hypovolemic  [ ]  Vasopressors [ ]  Inotropes   [ ]Respiratory failure present [ ]Mechanical ventilation [ ]Non-invasive ventilatory support [ ]High flow    [ ]Acute  [ ]Chronic [ ]Hypoxic  [ ]Hypercarbic [ ]Other  [ ]Other organ failure     LABS:                        9.4    6.80  )-----------( 188      ( 23 Jan 2025 05:22 )             30.1   01-23    133[L]  |  97[L]  |  14  ----------------------------<  88  4.1   |  27  |  0.63    Ca    9.2      23 Jan 2025 05:22  Phos  3.4     01-23  Mg     1.90     01-23        Urinalysis Basic - ( 23 Jan 2025 05:22 )    Color: x / Appearance: x / SG: x / pH: x  Gluc: 88 mg/dL / Ketone: x  / Bili: x / Urobili: x   Blood: x / Protein: x / Nitrite: x   Leuk Esterase: x / RBC: x / WBC x   Sq Epi: x / Non Sq Epi: x / Bacteria: x      RADIOLOGY & ADDITIONAL STUDIES: reviewed     PROTEIN CALORIE MALNUTRITION PRESENT: [ ]mild [ ]moderate [ ]severe [ ]underweight [ ]morbid obesity  https://www.andeal.org/vault/2440/web/files/ONC/Table_Clinical%20Characteristics%20to%20Document%20Malnutrition-White%20JV%20et%20al%202012.pdf    Height (cm): 165.1 (01-18-25 @ 05:00), 152.4 (10-29-24 @ 23:18)  Weight (kg): 52.2 (01-17-25 @ 15:55), 54.4 (10-29-24 @ 15:36)  BMI (kg/m2): 19.2 (01-18-25 @ 05:00), 22.5 (01-17-25 @ 15:55), 23.4 (10-29-24 @ 23:18)    [ ]PPSV2 < or = to 30% [ ]significant weight loss  [ ]poor nutritional intake  [ ]anasarca[ ]Artificial Nutrition      Other REFERRALS:  [ ]Hospice  [ ]Child Life  [ ]Social Work  [x ]Case management [ ]Holistic Therapy     Care Coordination Assessment 201 [C. Provider] (01-21-25 @ 14:09)      Palliative Performance Scale:  http://npcrc.org/files/news/palliative_performance_scale_ppsv2.pdf  (Ctrl +  left click to view)  Respiratory Distress Observation Tool:  https://homecareinformation.net/handouts/hen/Respiratory_Distress_Observation_Scale.pdf (Ctrl +  left click to view)  PAINAD Score:  http://geriatrictoolkit.[place].[facility]/cog/painad.pdf (Ctrl +  left click to view)

## 2025-01-23 NOTE — PATIENT PROFILE ADULT - IS THERE A SUSPICION OF ABUSE/NEGLIGENCE?
Patient: Nicole Nunes    Procedure Summary       Date: 01/23/25 Room / Location: [facility] 06 / [facility]    Anesthesia Start: 1116 Anesthesia Stop: 1158    Procedure: EXCISION, LESION, BREAST (Left) Diagnosis:       Fibroadenoma of right breast      (Fibroadenoma of right breast [D24.1])    Surgeons: Ruth Salazar MD Responsible Provider: Kori Granados MD    Anesthesia Type: MAC ASA Status: 2            Anesthesia Type: MAC    Vitals Value Taken Time   /57 01/23/25 1158   Temp  01/23/25 1158   Pulse 95 01/23/25 1158   Resp 15 01/23/25 1158   SpO2 95 01/23/25 1158       Anesthesia Post Evaluation    Patient location during evaluation: bedside  Patient participation: complete - patient participated  Level of consciousness: awake and alert  Pain score: 0  Pain management: adequate  Airway patency: patent  Cardiovascular status: acceptable and stable  Respiratory status: acceptable and room air  Hydration status: acceptable  Postoperative Nausea and Vomiting: none        No notable events documented.    
Quality 317: Preventative Care And Screening: Screening For High Blood Pressure And Follow-Up Documented: Pre-hypertensive or hypertensive blood pressure reading documented, and the indicated follow-up is documented
Detail Level: Detailed
Additional Notes: Pt referred to PCP
no

## 2025-01-23 NOTE — CONSULT NOTE ADULT - NSCONSULTADDITIONALINFOA_GEN_ALL_CORE
Allen Oliveros MD  Hospice and Palliative Care Fellow  Geriatrics and Palliative Care Team  This note and recommendations are not finalized until signed by attending physician.

## 2025-01-23 NOTE — CONSULT NOTE ADULT - PROBLEM SELECTOR RECOMMENDATION 2
- patient s/p chest tube placement  - started on MIST protocol per CT surgery  - CXR 1/23: IMPRESSION: Follow-up with right-sided chest tube and loculated effusion with tiny pneumothorax unchanged.  - management per primary and CT surgery

## 2025-01-23 NOTE — PROGRESS NOTE ADULT - SUBJECTIVE AND OBJECTIVE BOX
Subjective: pt seen and examined w Dr Elizabeth  no new complaints    Vital Signs:  Vital Signs Last 24 Hrs  T(C): 36.9 (01-23-25 @ 09:37), Max: 36.9 (01-23-25 @ 09:37)  T(F): 98.4 (01-23-25 @ 09:37), Max: 98.4 (01-23-25 @ 09:37)  HR: 102 (01-23-25 @ 09:37) (89 - 102)  BP: 125/68 (01-23-25 @ 09:37) (122/69 - 133/70)  RR: 18 (01-23-25 @ 09:37) (17 - 18)  SpO2: 96% (01-23-25 @ 09:37) (96% - 97%) on (O2)    PE  A+O x 3  Rt ptc site c/d/i. DRainage serous, clear  Abt 400cc drained in total. On Suction.   No air leak.   CXR slightly improved.   Pleural cult NGTD    Relevant labs, radiology and Medications reviewed                        9.4    6.80  )-----------( 188      ( 23 Jan 2025 05:22 )             30.1     01-23    133[L]  |  97[L]  |  14  ----------------------------<  88  4.1   |  27  |  0.63    Ca    9.2      23 Jan 2025 05:22  Phos  3.4     01-23  Mg     1.90     01-23        MEDICATIONS  (STANDING):  acetaminophen     Tablet .. 1000 milliGRAM(s) Oral every 8 hours  alteplase  Injectable for Pleural Effusion 10 milliGRAM(s) IntraPleural. every 12 hours  artificial tears (preservative free) Ophthalmic Solution 1 Drop(s) Both EYES three times a day  benzonatate 100 milliGRAM(s) Oral three times a day  dornase lauryn Solution for Pleural Effusion 5 milliGRAM(s) IntraPleural. every 12 hours  famotidine    Tablet 20 milliGRAM(s) Oral at bedtime  gabapentin 300 milliGRAM(s) Oral <User Schedule>  gabapentin 200 milliGRAM(s) Oral <User Schedule>  heparin   Injectable 5000 Unit(s) SubCutaneous every 12 hours  influenza  Vaccine (HIGH DOSE) 0.5 milliLiter(s) IntraMuscular once  lidocaine   4% Patch 1 Patch Transdermal daily  pantoprazole    Tablet 40 milliGRAM(s) Oral before breakfast  polyethylene glycol 3350 17 Gram(s) Oral daily  senna 2 Tablet(s) Oral at bedtime  sodium chloride 0.9% Solution for Pleural Effusion 30 milliLiter(s) IntraPleural. every 12 hours  sodium chloride 0.9%. 1000 milliLiter(s) (60 mL/Hr) IV Continuous <Continuous>    MEDICATIONS  (PRN):  aluminum hydroxide/magnesium hydroxide/simethicone Suspension 30 milliLiter(s) Oral every 4 hours PRN Dyspepsia  melatonin 3 milliGRAM(s) Oral at bedtime PRN Insomnia  morphine   Solution 5 milliGRAM(s) Oral every 4 hours PRN Severe Pain (7 - 10)  ondansetron   Disintegrating Tablet 4 milliGRAM(s) Oral every 6 hours PRN Nausea and/or Vomiting  ondansetron Injectable 4 milliGRAM(s) IV Push every 8 hours PRN Nausea and/or Vomiting  sodium chloride 0.65% Nasal 1 Spray(s) Both Nostrils four times a day PRN Nasal Congestion    Pertinent Physical Exam  I&O's Summary    22 Jan 2025 07:01  -  23 Jan 2025 07:00  --------------------------------------------------------  IN: 120 mL / OUT: 200 mL / NET: -80 mL    23 Jan 2025 07:01  -  23 Jan 2025 16:28  --------------------------------------------------------  IN: 0 mL / OUT: 450 mL / NET: -450 mL            Assessment  74F with rectal cancer with bony and lung metastases s/p RT currently on chemotherapy, 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 Pt s/p R PTC by IR and is currently undergoing MIST therapy.      PLAN  -CXR still w effusion.   - MIST dose #3 this afternoon  -HOLD anticoagulation while MIST in progress  -Keep PTC on suction.   -Document output accurately on I/O each shift  -Daily CXR   rest of care per primary medical team    Ashanti JUSTIN [id number]

## 2025-01-23 NOTE — CONSULT NOTE ADULT - CONVERSATION DETAILS
Dr. Oliveros, palliative fellow, Dr. Stewart, geriatrics-palliative attending, and Gemini Cruz, palliative care , met with patient today. We introduced ourselves as part of the palliative care team.   Patient verbalized concern regarding not receiving chemotherapy while she is here and that her goals are to improve and continue to receive treatment with her oncologist. Shares that she values life and that she would want to keep fighting and not give up. We discussed establishing a healthcare proxy as a voice and advocate for her in the case she cannot make medical decisions for herself, reinforced that we would always approach her to make her own medical decisions if she is able to and that it would not affect her current course of treatment. She declined filling out a HCP, worried that they would not follow her wishes. We informed her that if she does not establish a healthcare proxy, we would follow the wishes of her surrogate, established via [facility] surrogate laws which would be her one sibiling/sister. We inquired about resuscitative measures including compressions and intubation, pt states she would want that if there is a chance that it could help her. Dr. Oliveros, palliative fellow, Dr. Stewart, geriatrics-palliative attending, and Gemini Cruz, palliative care , met with patient today. We introduced ourselves as part of the palliative care team.     Patient verbalized concern regarding her cancer progressing while not receiving chemotherapy in the hospital. She states that her goals are to improve and continue to receive treatment with her oncologist. Shares that she values life and that she would want to keep fighting and not give up. We inquired about resuscitative measures including compressions and intubation, pt states she would want that if there is a chance that it could help her.    We discussed establishing a healthcare proxy as a voice and advocate for her in the case she cannot make medical decisions for herself, reinforced that we would always approach her to make her own medical decisions if she is able to and that it would not affect her current course of treatment. She declined filling out a HCP, worried that they would not follow her wishes. We informed her that if she does not establish a healthcare proxy, we would follow the wishes of her surrogate, established via [facility] surrogate laws which would be her one sibiling/sister. Encouraged her to further consider appointment of HCP.

## 2025-01-23 NOTE — PROGRESS NOTE ADULT - ASSESSMENT
74 year old female with history of recurrent rectal cancer admitted for dyspnea with right pleural effusion    1. Recurrent metastatic rectal cancer  ·	Patient follows with [facility]   ·	recent progression on FOLFOX  ·	planning to switch to lonsurf and bevacizumab  ·	s/p palliative radiation therapy to the right rib area as well as spine.    ·	no disease directed therapy as inpatient  ·	patient to follow with [facility] upon discharge    2.  Dyspnea  ·	Patient has loculated right pleural effusion  ·	 Appx 360cc clear yellow fluid aspirated. Drain placed to Pleur Evac.  ·	await pleural studies and cytology  ·	Appreciate CT surgery recs    Will cont to follow,    Danay Eaton NP  Hematology/Oncology  [facility]  [phone] (Office)  [phone] (Alt office)  Evenings and weekends please call MD on call or office

## 2025-01-23 NOTE — CONSULT NOTE ADULT - PROBLEM SELECTOR RECOMMENDATION 9
- Discussed with patient that we should make adjustments to attempt for improved pain control  - D/c tramadol  - Start Morphine 5mg PO q4h PRN severe pain (hold for RR < 12, oversedation, hypotension)  - Tylenol 650mg q6h ATC  - Continue gabapentin 300mg/200mg/300mg  - bowel regimen while on opioids: pt refusing senna, start on miralax qd  - Zofran PRN nausea/vomiting - Discussed with patient that we should make adjustments to attempt for improved pain control  - D/c tramadol  - Start Morphine 5mg PO q4h PRN severe pain (hold for RR < 12, oversedation, hypotension)  - Tylenol 1000mg q8h ATC  - Continue gabapentin 300mg/200mg/300mg  - bowel regimen while on opioids: pt refusing senna, start on miralax qd  - Zofran PRN nausea/vomiting - Outpatient oncology: [facility] Dr. Zoe Goldberg, MD  - oncology following: recent progression on FOLFOX, planning to switch to lonsurf and bevacizumab. No disease directed therapy as inpatient. Patient to follow with [facility] upon discharge

## 2025-01-23 NOTE — CONSULT NOTE ADULT - PROBLEM SELECTOR RECOMMENDATION 3
- Outpatient oncology: [facility] Dr. Zoe Goldberg, MD  - oncology following: recent progression on FOLFOX, planning to switch to lonsurf and bevacizumab. No disease directed therapy as inpatient. Patient to follow with [facility] upon discharge - Discussed with patient that we should make adjustments to attempt for improved pain control  - D/c tramadol  - Start Morphine Solution 5mg PO q4h PRN severe pain (hold for RR < 12, oversedation, hypotension)  - Tylenol 1000mg q8h ATC  - Continue gabapentin 300mg/200mg/300mg  - bowel regimen while on opioids: pt refusing senna, start on miralax qd  - Zofran PRN nausea/vomiting

## 2025-01-23 NOTE — CONSULT NOTE ADULT - ATTENDING COMMENTS
Patient was seen and evaluated with Dr. Oliveros, Palliative Medicine Fellow, during bedside rounds.   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     # Colorectal Cancer  - Patient with recurrent metastatic rectal cancer s/p colostomy s/p palliative radiation therapy to the right rib area as well as spine with recent progression on Folfox.    - CT Chest 1/17 - New and increased lytic masses involving right-sided ribs . Stable lytic metastasis of the right posterior elements at T12.Bilateral pulmonary nodules slightly increased in size.Left adrenal nodule, new from prior study.  - Follows with [facility]   - Oncology recommendations appreciated - planning to switch to lonsurf and bevacizumab outpatient     # Pleural Effusion  - CT Chest 1/17- Moderate loculated right pleural effusion.  - s/p R PTC by IR and is currently undergoing MIST therapy.  - CT surgery recommendations appreciated   - management as per primary team    # Neoplasm Related Pain  - Patient with sharp pain at right chest wall at chest tube placement site worse with movement with radiation of pain across chest. Pain at its worse is 8/10 and tolerable at 2/10. Pain has not been well managed especially in morning   Pt states that her pain regimen from [facility] is Tylenol 1000mg q8h, tramadol 50mg q8h, and if she has breakthrough pain will take aleve. She admits that pain is not controlled and is not sure if the medications are working.   - In past 24 hours, received Tramadol 100mg x2 and IV tylenol.   - Extensively educated/ counseled patient about pain regimen including d/c Tramadol and rotating to morphine. Addressed her questions regarding risks/ side effects of morphine   - Start PO Tylenol 1000mg q8 ATC; patient may refuse doses  - Continue home regimen of PO Gabapentin 300mg, 200mg, 300mg   - Start PO Morphine Solution 5mg q4 PRN severe pain (hold for hypotension, oversedation, respiratory depression)  - Bowel regimen while on opiates: pt refusing senna, start on miralax qd    # Advanced Care Planning/ goals of care  Extensively educated/ counseled patient about importance of HCP appointment.  She declined filling out a HCP, worried that they would not follow her wishes. We informed her that if she does not establish a healthcare proxy, we would follow the wishes of her surrogate, established via [facility] surrogate laws which would be her one sibiling/sister. Encouraged her to further consider appointment of HCP.  Patient Shares that she values life and as such would want to continue DMT and worries her cancer is progressing while off treatment in the hospital.   Advanced care planning extensively discussed. Reviewed the risks and benefits of resuscitative measures including cardiopulmonary resuscitation and mechanical ventilation at the end of life in the setting of advanced malignancy. She states she would want attempts of resuscitative measures of CPR/ mechanical intubation.   Patient is FULL CODE  See GOC note  16 minutes spent on advanced care planning/ goals of care     Case reviewed with primary team   Thank you for allowing us to participate in your patient's care. Please page [phone] for any questions/concerns. Patient was seen and evaluated with Dr. Oliveros, Palliative Medicine Fellow, during bedside rounds.   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     # Colorectal Cancer  - Patient with recurrent metastatic rectal cancer s/p colostomy s/p palliative radiation therapy to the right rib area as well as spine with recent progression on Folfox.    - CT Chest 1/17 - New and increased lytic masses involving right-sided ribs . Stable lytic metastasis of the right posterior elements at T12.Bilateral pulmonary nodules slightly increased in size.Left adrenal nodule, new from prior study.  - Follows with [facility] [place]   - Oncology recommendations appreciated - planning to switch to lonsurf and bevacizumab outpatient     # Pleural Effusion  - CT Chest 1/17- Moderate loculated right pleural effusion.  - s/p R PTC by IR and is currently undergoing MIST therapy.  - CT surgery recommendations appreciated   - management as per primary team    # Neoplasm Related Pain  - Patient with sharp pain at right chest wall at chest tube placement site worse with movement with radiation of pain across chest. Pain at its worse is 8/10 and tolerable at 2/10. Pain has not been well managed especially in morning   Pt states that her pain regimen from [facility] [place] is Tylenol 1000mg q8h, tramadol 50mg q8h, and if she has breakthrough pain will take aleve. She admits that pain is not controlled and is not sure if the medications are working.   - In past 24 hours, received Tramadol 100mg x2 and IV tylenol.   - Extensively educated/ counseled patient about pain regimen including d/c Tramadol and rotating to morphine. Addressed her questions regarding risks/ side effects of morphine   - Start PO Tylenol 1000mg q8 ATC; patient may refuse doses  - Continue home regimen of PO Gabapentin 300mg, 200mg, 300mg   - Start PO Morphine Solution 5mg q4 PRN severe pain (hold for hypotension, oversedation, respiratory depression)  - Bowel regimen while on opiates: pt refusing senna, start on miralax qd  - Follows with Dr. Hermilo Noe at [facility] [place] for palliative care     # Advanced Care Planning/ goals of care  Extensively educated/ counseled patient about importance of HCP appointment.  She declined filling out a HCP, worried that they would not follow her wishes. We informed her that if she does not establish a healthcare proxy, we would follow the wishes of her surrogate, established via [facility] surrogate laws which would be her one sibiling/sister. Encouraged her to further consider appointment of HCP.  Patient Shares that she values life and as such would want to continue DMT and worries her cancer is progressing while off treatment in the hospital.   Advanced care planning extensively discussed. Reviewed the risks and benefits of resuscitative measures including cardiopulmonary resuscitation and mechanical ventilation at the end of life in the setting of advanced malignancy. She states she would want attempts of resuscitative measures of CPR/ mechanical intubation.   Patient is FULL CODE  See GOC note  16 minutes spent on advanced care planning/ goals of care     Case reviewed with primary team   Thank you for allowing us to participate in your patient's care. Please page [phone] for any questions/concerns.

## 2025-01-23 NOTE — CHART NOTE - NSCHARTNOTEFT_GEN_A_CORE
CHF STAR Education:  Patient was reviewed per chart ; CHF education not provided due to:  [ x ] Patient not appropriate; education deferred - patient has no diagnosis of CHF  [ ] Patient not amenable; education declined     Jimena Norwood MS RD CDN Pager #[phone], MS Teams preferred.

## 2025-01-23 NOTE — CONSULT NOTE ADULT - PROBLEM SELECTOR RECOMMENDATION 6
- Symptom management as above  - GaP team will continue to follow  - In the event of newly developing, evolving, or worsening symptoms, please contact the Palliative Medicine team via pager ([facility] #[phone])

## 2025-01-23 NOTE — PROGRESS NOTE ADULT - SUBJECTIVE AND OBJECTIVE BOX
Patient is a 74y old  Female who presents with a chief complaint of pleural effusion (22 Jan 2025 13:23)  s/p pigtail, c/o pain at site, pleural studies pending      MEDICATIONS  (STANDING):  alteplase  Injectable for Pleural Effusion 10 milliGRAM(s) IntraPleural. every 12 hours  artificial tears (preservative free) Ophthalmic Solution 1 Drop(s) Both EYES three times a day  benzonatate 100 milliGRAM(s) Oral three times a day  dornase lauryn Solution for Pleural Effusion 5 milliGRAM(s) IntraPleural. every 12 hours  famotidine    Tablet 20 milliGRAM(s) Oral at bedtime  gabapentin 300 milliGRAM(s) Oral <User Schedule>  gabapentin 200 milliGRAM(s) Oral <User Schedule>  heparin   Injectable 5000 Unit(s) SubCutaneous every 12 hours  influenza  Vaccine (HIGH DOSE) 0.5 milliLiter(s) IntraMuscular once  lidocaine   4% Patch 1 Patch Transdermal daily  pantoprazole    Tablet 40 milliGRAM(s) Oral before breakfast  senna 2 Tablet(s) Oral at bedtime  sodium chloride 0.9% Solution for Pleural Effusion 30 milliLiter(s) IntraPleural. every 12 hours  sodium chloride 0.9%. 1000 milliLiter(s) (60 mL/Hr) IV Continuous <Continuous>    MEDICATIONS  (PRN):  acetaminophen     Tablet .. 1000 milliGRAM(s) Oral every 8 hours PRN Mild Pain (1 - 3), Moderate Pain (4 - 6), Severe Pain (7 - 10)  aluminum hydroxide/magnesium hydroxide/simethicone Suspension 30 milliLiter(s) Oral every 4 hours PRN Dyspepsia  melatonin 3 milliGRAM(s) Oral at bedtime PRN Insomnia  ondansetron   Disintegrating Tablet 4 milliGRAM(s) Oral every 6 hours PRN Nausea and/or Vomiting  ondansetron Injectable 4 milliGRAM(s) IV Push every 8 hours PRN Nausea and/or Vomiting  sodium chloride 0.65% Nasal 1 Spray(s) Both Nostrils four times a day PRN Nasal Congestion  traMADol 100 milliGRAM(s) Oral every 8 hours PRN Moderate Pain (4 - 6)      Vital Signs Last 24 Hrs  T(C): 36.9 (23 Jan 2025 09:37), Max: 37.1 (22 Jan 2025 16:20)  T(F): 98.4 (23 Jan 2025 09:37), Max: 98.8 (22 Jan 2025 16:20)  HR: 102 (23 Jan 2025 09:37) (89 - 104)  BP: 125/68 (23 Jan 2025 09:37) (122/69 - 133/70)  BP(mean): --  RR: 18 (23 Jan 2025 09:37) (17 - 18)  SpO2: 96% (23 Jan 2025 09:37) (96% - 98%)    Parameters below as of 23 Jan 2025 09:37  Patient On (Oxygen Delivery Method): room air        PE  NAD  Awake, alert  Anicteric, MMM  RRR  CTAB  Abd soft, NT, ND  No c/c/e  No rash grossly                            9.4    6.80  )-----------( 188      ( 23 Jan 2025 05:22 )             30.1       01-23    133[L]  |  97[L]  |  14  ----------------------------<  88  4.1   |  27  |  0.63    Ca    9.2      23 Jan 2025 05:22  Phos  3.4     01-23  Mg     1.90     01-23

## 2025-01-23 NOTE — CHART NOTE - NSCHARTNOTEFT_GEN_A_CORE
Under aseptic technique, Alteplase 10mg and Dornase 5mg instilled via right pigtail for fibrinolysis. Sterile flush given before and after. Patient tolerated procedure well, no complication noted.   PTC to remain clamped x 1 hr then unclamp and continue to strict suction. Patient and nurse made aware and verbalized understanding

## 2025-01-23 NOTE — CONSULT NOTE ADULT - ASSESSMENT
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 GaP team consulted for GOC and symptom management.

## 2025-01-23 NOTE — PROGRESS NOTE ADULT - SUBJECTIVE AND OBJECTIVE BOX
SUBJECTIVE/ OVERNIGHT EVENTS:  --- Coverage for Dr. Alex ---  requesting IVF  states she needs it   gentle IVF ordered   risk/benefits explained  no cp, no sob, no n/v/d. no abdominal pain.  no headache, no dizziness.   pain is better today  palliative on the case for pain management.     --------------------------------------------------------------------------------------------  LABS:                        9.4    6.80  )-----------( 188      ( 23 Jan 2025 05:22 )             30.1     01-23    133[L]  |  97[L]  |  14  ----------------------------<  88  4.1   |  27  |  0.63    Ca    9.2      23 Jan 2025 05:22  Phos  3.4     01-23  Mg     1.90     01-23        CAPILLARY BLOOD GLUCOSE            Urinalysis Basic - ( 23 Jan 2025 05:22 )    Color: x / Appearance: x / SG: x / pH: x  Gluc: 88 mg/dL / Ketone: x  / Bili: x / Urobili: x   Blood: x / Protein: x / Nitrite: x   Leuk Esterase: x / RBC: x / WBC x   Sq Epi: x / Non Sq Epi: x / Bacteria: x        RADIOLOGY & ADDITIONAL TESTS:    Imaging Personally Reviewed:  [x] YES  [ ] NO    Consultant(s) Notes Reviewed:  [x] YES  [ ] NO    MEDICATIONS  (STANDING):  acetaminophen     Tablet .. 1000 milliGRAM(s) Oral every 8 hours  alteplase  Injectable for Pleural Effusion 10 milliGRAM(s) IntraPleural. every 12 hours  artificial tears (preservative free) Ophthalmic Solution 1 Drop(s) Both EYES three times a day  benzonatate 100 milliGRAM(s) Oral three times a day  dornase lauryn Solution for Pleural Effusion 5 milliGRAM(s) IntraPleural. every 12 hours  famotidine    Tablet 20 milliGRAM(s) Oral at bedtime  gabapentin 300 milliGRAM(s) Oral <User Schedule>  gabapentin 200 milliGRAM(s) Oral <User Schedule>  heparin   Injectable 5000 Unit(s) SubCutaneous every 12 hours  influenza  Vaccine (HIGH DOSE) 0.5 milliLiter(s) IntraMuscular once  lidocaine   4% Patch 1 Patch Transdermal daily  pantoprazole    Tablet 40 milliGRAM(s) Oral before breakfast  polyethylene glycol 3350 17 Gram(s) Oral daily  senna 2 Tablet(s) Oral at bedtime  sodium chloride 0.9% Solution for Pleural Effusion 30 milliLiter(s) IntraPleural. every 12 hours  sodium chloride 0.9%. 1000 milliLiter(s) (60 mL/Hr) IV Continuous <Continuous>    MEDICATIONS  (PRN):  aluminum hydroxide/magnesium hydroxide/simethicone Suspension 30 milliLiter(s) Oral every 4 hours PRN Dyspepsia  melatonin 3 milliGRAM(s) Oral at bedtime PRN Insomnia  morphine   Solution 5 milliGRAM(s) Oral every 4 hours PRN Severe Pain (7 - 10)  ondansetron   Disintegrating Tablet 4 milliGRAM(s) Oral every 6 hours PRN Nausea and/or Vomiting  ondansetron Injectable 4 milliGRAM(s) IV Push every 8 hours PRN Nausea and/or Vomiting  sodium chloride 0.65% Nasal 1 Spray(s) Both Nostrils four times a day PRN Nasal Congestion      Care Discussed with Consultants/Other Providers [x] YES  [ ] NO    Vital Signs Last 24 Hrs  T(C): 37.1 (23 Jan 2025 16:22), Max: 37.1 (23 Jan 2025 16:22)  T(F): 98.8 (23 Jan 2025 16:22), Max: 98.8 (23 Jan 2025 16:22)  HR: 110 (23 Jan 2025 16:22) (89 - 110)  BP: 125/70 (23 Jan 2025 16:22) (122/69 - 133/70)  BP(mean): --  RR: 18 (23 Jan 2025 16:22) (17 - 18)  SpO2: 97% (23 Jan 2025 16:22) (96% - 97%)    Parameters below as of 23 Jan 2025 09:37  Patient On (Oxygen Delivery Method): room air      I&O's Summary    22 Jan 2025 07:01  -  23 Jan 2025 07:00  --------------------------------------------------------  IN: 120 mL / OUT: 200 mL / NET: -80 mL    23 Jan 2025 07:01  -  23 Jan 2025 17:36  --------------------------------------------------------  IN: 0 mL / OUT: 450 mL / NET: -450 mL        PHYSICAL EXAM:  GENERAL: NAD, thin-elderly, comfortable on room air  HEAD:  Atraumatic, Normocephalic  EYES: EOMI, PERRLA, conjunctiva and sclera clear  NECK: Supple, No JVD  CHEST/LUNG: mild decrease breath sounds bilaterally; No wheeze +chest tube  HEART: Regular rate and rhythm; No murmurs, rubs, or gallops  ABDOMEN: Soft, Nontender, Nondistended; Bowel sounds present  Neuro: AAOx3, no focal weakness   EXTREMITIES:  2+ Peripheral Pulses, No clubbing, cyanosis, or edema  SKIN: No rashes or lesions

## 2025-01-24 LAB
ANION GAP SERPL CALC-SCNC: 10 MMOL/L — SIGNIFICANT CHANGE UP (ref 7–14)
BUN SERPL-MCNC: 14 MG/DL — SIGNIFICANT CHANGE UP (ref 7–23)
CALCIUM SERPL-MCNC: 9.4 MG/DL — SIGNIFICANT CHANGE UP (ref 8.4–10.5)
CHLORIDE SERPL-SCNC: 102 MMOL/L — SIGNIFICANT CHANGE UP (ref 98–107)
CO2 SERPL-SCNC: 25 MMOL/L — SIGNIFICANT CHANGE UP (ref 22–31)
CREAT SERPL-MCNC: 0.71 MG/DL — SIGNIFICANT CHANGE UP (ref 0.5–1.3)
EGFR: 90 ML/MIN/1.73M2 — SIGNIFICANT CHANGE UP
GLUCOSE SERPL-MCNC: 94 MG/DL — SIGNIFICANT CHANGE UP (ref 70–99)
HCT VFR BLD CALC: 32.5 % — LOW (ref 34.5–45)
HGB BLD-MCNC: 9.6 G/DL — LOW (ref 11.5–15.5)
MAGNESIUM SERPL-MCNC: 2 MG/DL — SIGNIFICANT CHANGE UP (ref 1.6–2.6)
MCHC RBC-ENTMCNC: 28.5 PG — SIGNIFICANT CHANGE UP (ref 27–34)
MCHC RBC-ENTMCNC: 29.5 G/DL — LOW (ref 32–36)
MCV RBC AUTO: 96.4 FL — SIGNIFICANT CHANGE UP (ref 80–100)
NRBC # BLD AUTO: 0 K/UL — SIGNIFICANT CHANGE UP (ref 0–0)
NRBC # BLD: 0 /100 WBCS — SIGNIFICANT CHANGE UP (ref 0–0)
NRBC # FLD: 0 K/UL — SIGNIFICANT CHANGE UP (ref 0–0)
NRBC BLD-RTO: 0 /100 WBCS — SIGNIFICANT CHANGE UP (ref 0–0)
PHOSPHATE SERPL-MCNC: 3.9 MG/DL — SIGNIFICANT CHANGE UP (ref 2.5–4.5)
PLATELET # BLD AUTO: 180 K/UL — SIGNIFICANT CHANGE UP (ref 150–400)
POTASSIUM SERPL-MCNC: 4.1 MMOL/L — SIGNIFICANT CHANGE UP (ref 3.5–5.3)
POTASSIUM SERPL-SCNC: 4.1 MMOL/L — SIGNIFICANT CHANGE UP (ref 3.5–5.3)
RBC # BLD: 3.37 M/UL — LOW (ref 3.8–5.2)
RBC # FLD: 17.2 % — HIGH (ref 10.3–14.5)
SODIUM SERPL-SCNC: 137 MMOL/L — SIGNIFICANT CHANGE UP (ref 135–145)
WBC # BLD: 7.33 K/UL — SIGNIFICANT CHANGE UP (ref 3.8–10.5)
WBC # FLD AUTO: 7.33 K/UL — SIGNIFICANT CHANGE UP (ref 3.8–10.5)

## 2025-01-24 PROCEDURE — 99233 SBSQ HOSP IP/OBS HIGH 50: CPT | Mod: GC

## 2025-01-24 PROCEDURE — 32562 LYSE CHEST FIBRIN SUBQ DAY: CPT | Mod: RT

## 2025-01-24 PROCEDURE — 71045 X-RAY EXAM CHEST 1 VIEW: CPT | Mod: 26

## 2025-01-24 PROCEDURE — 99497 ADVNCD CARE PLAN 30 MIN: CPT | Mod: GC,25

## 2025-01-24 RX ORDER — MORPHINE SULFATE 60 MG/1
5 TABLET, FILM COATED, EXTENDED RELEASE ORAL EVERY 4 HOURS
Refills: 0 | Status: DISCONTINUED | OUTPATIENT
Start: 2025-01-24 | End: 2025-01-27

## 2025-01-24 RX ORDER — MORPHINE SULFATE 60 MG/1
10 TABLET, FILM COATED, EXTENDED RELEASE ORAL EVERY 4 HOURS
Refills: 0 | Status: DISCONTINUED | OUTPATIENT
Start: 2025-01-24 | End: 2025-01-27

## 2025-01-24 RX ADMIN — ACETAMINOPHEN 400 MILLIGRAM(S): 160 SUSPENSION ORAL at 07:26

## 2025-01-24 RX ADMIN — PANTOPRAZOLE 40 MILLIGRAM(S): 20 TABLET, DELAYED RELEASE ORAL at 07:31

## 2025-01-24 RX ADMIN — LIDOCAINE HYDROCHLORIDE 1 PATCH: 30 CREAM TOPICAL at 11:18

## 2025-01-24 RX ADMIN — GABAPENTIN 200 MILLIGRAM(S): 800 TABLET ORAL at 17:47

## 2025-01-24 RX ADMIN — Medication 100 MILLIGRAM(S): at 22:10

## 2025-01-24 RX ADMIN — LIDOCAINE HYDROCHLORIDE 1 PATCH: 30 CREAM TOPICAL at 11:00

## 2025-01-24 RX ADMIN — ACETAMINOPHEN 1000 MILLIGRAM(S): 160 SUSPENSION ORAL at 07:50

## 2025-01-24 RX ADMIN — Medication 1 DROP(S): at 22:10

## 2025-01-24 RX ADMIN — ACETAMINOPHEN 1000 MILLIGRAM(S): 160 SUSPENSION ORAL at 00:18

## 2025-01-24 RX ADMIN — FAMOTIDINE 20 MILLIGRAM(S): 10 INJECTION INTRAVENOUS at 22:11

## 2025-01-24 RX ADMIN — MORPHINE SULFATE 5 MILLIGRAM(S): 60 TABLET, FILM COATED, EXTENDED RELEASE ORAL at 11:50

## 2025-01-24 RX ADMIN — MORPHINE SULFATE 5 MILLIGRAM(S): 60 TABLET, FILM COATED, EXTENDED RELEASE ORAL at 16:35

## 2025-01-24 RX ADMIN — Medication 1 DROP(S): at 07:27

## 2025-01-24 RX ADMIN — Medication 1 DROP(S): at 14:13

## 2025-01-24 RX ADMIN — LIDOCAINE HYDROCHLORIDE 1 PATCH: 30 CREAM TOPICAL at 23:00

## 2025-01-24 RX ADMIN — GABAPENTIN 300 MILLIGRAM(S): 800 TABLET ORAL at 11:18

## 2025-01-24 RX ADMIN — ACETAMINOPHEN 1000 MILLIGRAM(S): 160 SUSPENSION ORAL at 19:03

## 2025-01-24 RX ADMIN — MORPHINE SULFATE 5 MILLIGRAM(S): 60 TABLET, FILM COATED, EXTENDED RELEASE ORAL at 11:22

## 2025-01-24 RX ADMIN — MORPHINE SULFATE 5 MILLIGRAM(S): 60 TABLET, FILM COATED, EXTENDED RELEASE ORAL at 15:55

## 2025-01-24 RX ADMIN — LIDOCAINE HYDROCHLORIDE 1 PATCH: 30 CREAM TOPICAL at 19:04

## 2025-01-24 RX ADMIN — Medication 5000 UNIT(S): at 17:48

## 2025-01-24 RX ADMIN — LIDOCAINE HYDROCHLORIDE 1 PATCH: 30 CREAM TOPICAL at 07:52

## 2025-01-24 RX ADMIN — GABAPENTIN 300 MILLIGRAM(S): 800 TABLET ORAL at 22:17

## 2025-01-24 RX ADMIN — Medication 100 MILLIGRAM(S): at 11:17

## 2025-01-24 NOTE — CHART NOTE - NSCHARTNOTEFT_GEN_A_CORE
Under aseptic technique, 10mg of Alteplase and 5mg of Dornase were introduced into the Right pleural pigtail catheter for fibrinolysis. The tube was flushed with sterile NS before and after. The PTC remained clamped x 1 h and was then left to suction.

## 2025-01-24 NOTE — PROGRESS NOTE ADULT - ASSESSMENT
74 year old female with history of recurrent rectal cancer admitted for dyspnea with right pleural effusion    1. Recurrent metastatic rectal cancer  ·	Patient follows with [facility]   ·	recent progression on FOLFOX  ·	planning to switch to lonsurf and bevacizumab  ·	s/p palliative radiation therapy to the right rib area as well as spine.    ·	no disease directed therapy as inpatient  ·	patient to follow with [facility] upon discharge    2.  Dyspnea  ·	Patient has loculated right pleural effusion  ·	 Appx 360cc clear yellow fluid aspirated. Drain placed to Pleur Evac.  ·	await pleural studies and cytology  ·	Appreciate CT surgery recs    Will cont to follow,    Danay Eaton NP  Hematology/Oncology  [facility]  [phone] (Office)  [phone] (Alt office)  Evenings and weekends please call MD on call or office

## 2025-01-24 NOTE — PROGRESS NOTE ADULT - ATTENDING COMMENTS
Patient was seen and evaluated with Dr. Oliveros, Palliative Medicine Fellow, during bedside rounds.   Agree with above findings and recommendations, edited documentation as appropriate to reflect the plan of care discussed with patient and myself with the following additions:    In past 24 hours, received Morphine 5mg PO PRN x2. Zofran PRN x1.   Patient seen and examined at bedside, appears comfortable but worried about her pain getting worse without her tramadol. Admits again that she is not sure if it was helping her before. Denies any side effects with the morphine, no increased lethargy, nausea. She is also not sure if morphine provided her relief or if it was the tylenol or gabapentin. Stating that she is now in 4/10 pain, located at the chest tube.      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     # Colorectal Cancer  - Patient with recurrent metastatic rectal cancer s/p colostomy s/p palliative radiation therapy to the right rib area as well as spine with recent progression on Folfox.    - CT Chest 1/17 - New and increased lytic masses involving right-sided ribs . Stable lytic metastasis of the right posterior elements at T12.Bilateral pulmonary nodules slightly increased in size.Left adrenal nodule, new from prior study.  - Follows with [facility]   - Oncology recommendations appreciated - planning to switch to lonsurf and bevacizumab outpatient     # Pleural Effusion  - CT Chest 1/17- Moderate loculated right pleural effusion.  - s/p R PTC by IR and is currently undergoing MIST therapy.  - CT surgery recommendations appreciated   - management as per primary team    # Neoplasm Related Pain  - PO Tylenol 1000mg q8 ATC; patient may refuse doses  - Continue home regimen of PO Gabapentin 300mg, 200mg, 300mg   - PO Morphine Solution 5mg q4 PRN moderate pain (hold for hypotension, oversedation, respiratory depression)  - Increase to PO Morphine Solution 10mg q4 PRN severe pain (hold for hypotension, oversedation, respiratory depression)  - Educated/ counseled patient about pain regimen.   - Bowel regimen while on opiates: pt refusing senna, start on miralax qd  - Follows with Dr. Hermilo Noe at [facility] for palliative care     # Advanced Care Planning/ goals of care  1/24 - Extensively educated/ counseled patient about importance of HCP appointment.  She again declined filling out a HCP. Reviewed [place] surrogacy [place] for medical decisions in the absence of HCP paperwork, . Encouraged her to further consider appointment of HCP.  Patient stated she would want CPR but not intubation. Extensively counseled/educated patient about resuscitative process of CPR and mechanical intubation in event of cardiopulmonary arrest. However, she did not agree with the process of resuscitation and insisted that she could have CPR without intubation.   Counseled on different treatment options for advanced directives Including allowing for natural death vs attempts of CPR/ intubation with liberation from ventilator if unable to be medically weaned vs. attempts of CPR/intubation followed by trach placement if unable to be medically weaned. Pt refused to accept that and stated that she said we were incorrect about the resuscitative process and wished to confer with her friends who are nurses. Encouraged her to further consider .  She agrees she wishes to be FULL CODE for now.   Patient is FULL CODE  See [facility] note  16 minutes spent on advanced care planning/ goals of care     Case reviewed with primary team   Thank you for allowing us to participate in your patient's care. Please page [phone] for any questions/concerns.

## 2025-01-24 NOTE — PROGRESS NOTE ADULT - SUBJECTIVE AND OBJECTIVE BOX
SUBJECTIVE/ OVERNIGHT EVENTS:  --- Coverage for Dr. Alex ---  pain is much better controlled on oral liquid morphine  no cp, no sob, no n/v/d. no abdominal pain.  no headache, no dizziness.   chest tube in place.  MIST tx today    --------------------------------------------------------------------------------------------  LABS:                        9.6    7.33  )-----------( 180      ( 24 Jan 2025 05:30 )             32.5     01-24    137  |  102  |  14  ----------------------------<  94  4.1   |  25  |  0.71    Ca    9.4      24 Jan 2025 05:30  Phos  3.9     01-24  Mg     2.00     01-24        CAPILLARY BLOOD GLUCOSE            Urinalysis Basic - ( 24 Jan 2025 05:30 )    Color: x / Appearance: x / SG: x / pH: x  Gluc: 94 mg/dL / Ketone: x  / Bili: x / Urobili: x   Blood: x / Protein: x / Nitrite: x   Leuk Esterase: x / RBC: x / WBC x   Sq Epi: x / Non Sq Epi: x / Bacteria: x        RADIOLOGY & ADDITIONAL TESTS:    Imaging Personally Reviewed:  [x] YES  [ ] NO    Consultant(s) Notes Reviewed:  [x] YES  [ ] NO    MEDICATIONS  (STANDING):  acetaminophen     Tablet .. 1000 milliGRAM(s) Oral every 8 hours  alteplase  Injectable for Pleural Effusion 10 milliGRAM(s) IntraPleural. every 12 hours  artificial tears (preservative free) Ophthalmic Solution 1 Drop(s) Both EYES three times a day  benzonatate 100 milliGRAM(s) Oral three times a day  dornase lauryn Solution for Pleural Effusion 5 milliGRAM(s) IntraPleural. every 12 hours  famotidine    Tablet 20 milliGRAM(s) Oral at bedtime  gabapentin 300 milliGRAM(s) Oral <User Schedule>  gabapentin 200 milliGRAM(s) Oral <User Schedule>  heparin   Injectable 5000 Unit(s) SubCutaneous every 12 hours  influenza  Vaccine (HIGH DOSE) 0.5 milliLiter(s) IntraMuscular once  lidocaine   4% Patch 1 Patch Transdermal daily  pantoprazole    Tablet 40 milliGRAM(s) Oral before breakfast  polyethylene glycol 3350 17 Gram(s) Oral daily  senna 2 Tablet(s) Oral at bedtime  sodium chloride 0.9% Solution for Pleural Effusion 30 milliLiter(s) IntraPleural. every 12 hours    MEDICATIONS  (PRN):  aluminum hydroxide/magnesium hydroxide/simethicone Suspension 30 milliLiter(s) Oral every 4 hours PRN Dyspepsia  melatonin 3 milliGRAM(s) Oral at bedtime PRN Insomnia  morphine   Solution 5 milliGRAM(s) Oral every 4 hours PRN Severe Pain (7 - 10)  ondansetron   Disintegrating Tablet 4 milliGRAM(s) Oral every 6 hours PRN Nausea and/or Vomiting  ondansetron Injectable 4 milliGRAM(s) IV Push every 8 hours PRN Nausea and/or Vomiting  sodium chloride 0.65% Nasal 1 Spray(s) Both Nostrils four times a day PRN Nasal Congestion      Care Discussed with Consultants/Other Providers [x] YES  [ ] NO    Vital Signs Last 24 Hrs  T(C): 37.1 (24 Jan 2025 07:55), Max: 37.1 (23 Jan 2025 16:22)  T(F): 98.8 (24 Jan 2025 07:55), Max: 98.8 (23 Jan 2025 16:22)  HR: 99 (24 Jan 2025 07:55) (99 - 110)  BP: 97/78 (24 Jan 2025 07:55) (97/78 - 125/70)  BP(mean): --  RR: 18 (24 Jan 2025 07:55) (17 - 18)  SpO2: 97% (24 Jan 2025 07:55) (96% - 97%)    Parameters below as of 24 Jan 2025 07:55  Patient On (Oxygen Delivery Method): room air      I&O's Summary    23 Jan 2025 07:01  -  24 Jan 2025 07:00  --------------------------------------------------------  IN: 120 mL / OUT: 1000 mL / NET: -880 mL        PHYSICAL EXAM:  GENERAL: NAD, thin-elderly, comfortable on 2L  HEAD:  Atraumatic, Normocephalic  EYES: EOMI, PERRLA, conjunctiva and sclera clear  NECK: Supple, No JVD  CHEST/LUNG: mild decrease breath sounds bilaterally; No wheeze +chest tube: serosanguinous  HEART: Regular rate and rhythm; No murmurs, rubs, or gallops  ABDOMEN: Soft, Nontender, Nondistended; Bowel sounds present  Neuro: AAOx3, no focal weakness   EXTREMITIES:  2+ Peripheral Pulses, No clubbing, cyanosis, or edema  SKIN: No rashes or lesions

## 2025-01-24 NOTE — PROGRESS NOTE ADULT - SUBJECTIVE AND OBJECTIVE BOX
SUBJECTIVE AND OBJECTIVE:  Indication for Geriatrics and Palliative Care Services/INTERVAL HPI:  Symptom management and GOC in patient with metastatic colon cancer.    OVERNIGHT EVENTS:  Patient seen and examined at bedside, appears comfortable but worried about her pain getting worse without her tramadol. Admits again that she is not sure if it was helping her before. Denies any side effects with the morphine, no increased lethargy, nausea. She is also not sure if morphine provided her relief or if it was the tylenol or gabapentin. Stating that she is now in 4/10 pain, located at the chest tube.  Chart review, in 24hrs 7AM-7AM, patient used Morphine 5mg PO PRN x2. Zofran PRN x1.     DNR on chart:  Allergies    sulfa drugs (Pruritus)    Intolerances    MEDICATIONS  (STANDING):  acetaminophen     Tablet .. 1000 milliGRAM(s) Oral every 8 hours  alteplase  Injectable for Pleural Effusion 10 milliGRAM(s) IntraPleural. every 12 hours  artificial tears (preservative free) Ophthalmic Solution 1 Drop(s) Both EYES three times a day  benzonatate 100 milliGRAM(s) Oral three times a day  dornase lauryn Solution for Pleural Effusion 5 milliGRAM(s) IntraPleural. every 12 hours  famotidine    Tablet 20 milliGRAM(s) Oral at bedtime  gabapentin 300 milliGRAM(s) Oral <User Schedule>  gabapentin 200 milliGRAM(s) Oral <User Schedule>  heparin   Injectable 5000 Unit(s) SubCutaneous every 12 hours  influenza  Vaccine (HIGH DOSE) 0.5 milliLiter(s) IntraMuscular once  lidocaine   4% Patch 1 Patch Transdermal daily  pantoprazole    Tablet 40 milliGRAM(s) Oral before breakfast  polyethylene glycol 3350 17 Gram(s) Oral daily  senna 2 Tablet(s) Oral at bedtime  sodium chloride 0.9% Solution for Pleural Effusion 30 milliLiter(s) IntraPleural. every 12 hours    MEDICATIONS  (PRN):  aluminum hydroxide/magnesium hydroxide/simethicone Suspension 30 milliLiter(s) Oral every 4 hours PRN Dyspepsia  melatonin 3 milliGRAM(s) Oral at bedtime PRN Insomnia  morphine   Solution 10 milliGRAM(s) Oral every 4 hours PRN Severe Pain (7 - 10)  morphine   Solution 5 milliGRAM(s) Oral every 4 hours PRN Moderate Pain (4 - 6)  ondansetron   Disintegrating Tablet 4 milliGRAM(s) Oral every 6 hours PRN Nausea and/or Vomiting  ondansetron Injectable 4 milliGRAM(s) IV Push every 8 hours PRN Nausea and/or Vomiting  sodium chloride 0.65% Nasal 1 Spray(s) Both Nostrils four times a day PRN Nasal Congestion      ITEMS UNCHECKED ARE NOT PRESENT    PRESENT SYMPTOMS: [ ]Unable to self-report - see  CPOT, PAINADS, RDOS below  Source if other than patient:  [ ]Family   [ ]Team     Pain:  [ x]yes [ ]no  QOL impact -   Location -                    Aggravating factors -  Quality -  Radiation -  Timing-  Severity (0-10 scale):  Minimal acceptable level (0-10 scale):     PCSSQ[Palliative Care Spiritual Screening Question]   Severity (0-10):  Score of 4 or > indicate consideration of Chaplaincy referral.  Chaplaincy Referral: [ ] yes [ ] refused [ ] following    Caregiver [place]? : [ ] yes [ ] no  [ ] deferred:  Social work referral [ ] Patient & Family Centered Care Referral [ ]     Anticipatory Grief present?:  [ ] yes [ ] no  [ ] deferred: Social work referral [ ] Patient & Family Centered Care Referral [ ]      Other Symptoms:  [ x]All other review of systems negative     PHYSICAL EXAM:  Vital Signs Last 24 Hrs  T(C): 36.7 (24 Jan 2025 10:00), Max: 37.1 (23 Jan 2025 16:22)  T(F): 98 (24 Jan 2025 10:00), Max: 98.8 (23 Jan 2025 16:22)  HR: 107 (24 Jan 2025 10:00) (99 - 110)  BP: 113/62 (24 Jan 2025 10:00) (97/78 - 125/70)  BP(mean): --  RR: 18 (24 Jan 2025 10:00) (17 - 18)  SpO2: 100% (24 Jan 2025 10:00) (96% - 100%)    Parameters below as of 24 Jan 2025 10:00  Patient On (Oxygen Delivery Method): room air     I&O's Summary    23 Jan 2025 07:01  -  24 Jan 2025 07:00  --------------------------------------------------------  IN: 120 mL / OUT: 1000 mL / NET: -880 mL       GENERAL: [ ]Cachexia    [x ]Alert  [x ]Oriented x 3  [ ]Lethargic  [ ]Unarousable  [x ]Verbal  [ ]Non-Verbal  Behavioral:   [x ] Anxiety  [ ] Delirium [ ] Agitation [ ] Other  HEENT:  [x ]Normal   [ ]Dry mouth   [ ]ET Tube/Trach  [ ]Oral lesions  PULMONARY:   [ ]Clear [ ]Tachypnea  [ ]Audible excessive secretions   [ ]Rhonchi        [ ]Right [ ]Left [ ]Bilateral  [ ]Crackles        [ ]Right [ ]Left [ ]Bilateral  [ ]Wheezing     [ ]Right [ ]Left [ ]Bilateral  [x ]Diminished breath sounds [ x]right [ ]left [ ]bilateral  CARDIOVASCULAR:    [ x]Regular [ ]Irregular [ ]Tachy  [ ]Luther [ ]Murmur [ ]Other  GASTROINTESTINAL:  [ x]Soft  [ ]Distended   [x ]+BS  [ x]Non tender [ ]Tender  [ ]Other [ ]PEG [ ]OGT/ NGT  Last BM: 1/23/25 [x] Colostomy bag LLQ- stool in colostomy  GENITOURINARY:  [x ]Normal [ ] Incontinent   [ ]Oliguria/Anuria   [ ]Nj  MUSCULOSKELETAL:   [x ]Normal   [ ]Weakness  [ ]Bed/Wheelchair bound [ ]Edema  NEUROLOGIC:   [x ]No focal deficits  [ ]Cognitive impairment  [ ]Dysphagia [ ]Dysarthria [ ]Paresis [ ]Other   SKIN:   [x ]Normal  [ ]Rash  [ ]Other  [ ]Pressure ulcer(s)       Present on admission [ ]y [ ]n    CRITICAL CARE:  [ ]Shock Present  [ ]Septic [ ]Cardiogenic [ ]Neurologic [ ]Hypovolemic  [ ]Vasopressors [ ]Inotropes  [ ]Respiratory failure present [ ]Mechanical Ventilation [ ]Non-invasive ventilatory support [ ]High-Flow   [ ]Acute  [ ]Chronic [ ]Hypoxic  [ ]Hypercarbic [ ]Other  [ ]Other organ failure     LABS:                        9.6    7.33  )-----------( 180      ( 24 Jan 2025 05:30 )             32.5   01-24    137  |  102  |  14  ----------------------------<  94  4.1   |  25  |  0.71    Ca    9.4      24 Jan 2025 05:30  Phos  3.9     01-24  Mg     2.00     01-24        Urinalysis Basic - ( 24 Jan 2025 05:30 )    Color: x / Appearance: x / SG: x / pH: x  Gluc: 94 mg/dL / Ketone: x  / Bili: x / Urobili: x   Blood: x / Protein: x / Nitrite: x   Leuk Esterase: x / RBC: x / WBC x   Sq Epi: x / Non Sq Epi: x / Bacteria: x      RADIOLOGY & ADDITIONAL STUDIES:    Protein Calorie Malnutrition Present: [ ]mild [ ]moderate [ ]severe [ ]underweight [ ]morbid obesity  https://www.andeal.org/vault/2440/web/files/ONC/Table_Clinical%20Characteristics%20to%20Document%20Malnutrition-White%20JV%20et%20al%202012.pdf    Height (cm): 165.1 (01-18-25 @ 05:00), 152.4 (10-29-24 @ 23:18)  Weight (kg): 52.2 (01-17-25 @ 15:55), 54.4 (10-29-24 @ 15:36)  BMI (kg/m2): 19.2 (01-18-25 @ 05:00), 22.5 (01-17-25 @ 15:55), 23.4 (10-29-24 @ 23:18)    [ ]PPSV2 < or = 30%  [ ]significant weight loss [ ]poor nutritional intake [ ]anasarca[ ]Artificial Nutrition    Other REFERRALS:  [ ]Hospice  [ ]Child Life  [ ]Social Work  [ ]Case management [ ]Holistic Therapy     Palliative Performance Scale:  http://npcrc.org/files/news/palliative_performance_scale_ppsv2.pdf  (Ctrl +  left click to view)  Respiratory Distress Observation Tool:  https://homecareinformation.net/handouts/hen/Respiratory_Distress_Observation_Scale.pdf (Ctrl +  left click to view)  PAINAD Score:  http://geriatrictoolkit.[facility]/cog/painad.pdf (Ctrl +  left click to view)       Indication for Geriatrics and Palliative Care Services:  Symptom management and GOC in patient with metastatic colon cancer.    SUBJECTIVE AND OBJECTIVE:  Patient seen and examined at bedside, appears comfortable but worried about her pain getting worse without her tramadol. Admits again that she is not sure if it was helping her before. Denies any side effects with the morphine, no increased lethargy, nausea. She is also not sure if morphine provided her relief or if it was the tylenol or gabapentin. Stating that she is now in 4/10 pain, located at the chest tube.    INTERVAL HISTORY:   Chart review, in 24hrs 7AM-7AM, patient used Morphine 5mg PO PRN x2. Zofran PRN x1.     Allergies    sulfa drugs (Pruritus)    Intolerances    MEDICATIONS  (STANDING):  acetaminophen     Tablet .. 1000 milliGRAM(s) Oral every 8 hours  alteplase  Injectable for Pleural Effusion 10 milliGRAM(s) IntraPleural. every 12 hours  artificial tears (preservative free) Ophthalmic Solution 1 Drop(s) Both EYES three times a day  benzonatate 100 milliGRAM(s) Oral three times a day  dornase lauryn Solution for Pleural Effusion 5 milliGRAM(s) IntraPleural. every 12 hours  famotidine    Tablet 20 milliGRAM(s) Oral at bedtime  gabapentin 300 milliGRAM(s) Oral <User Schedule>  gabapentin 200 milliGRAM(s) Oral <User Schedule>  heparin   Injectable 5000 Unit(s) SubCutaneous every 12 hours  influenza  Vaccine (HIGH DOSE) 0.5 milliLiter(s) IntraMuscular once  lidocaine   4% Patch 1 Patch Transdermal daily  pantoprazole    Tablet 40 milliGRAM(s) Oral before breakfast  polyethylene glycol 3350 17 Gram(s) Oral daily  senna 2 Tablet(s) Oral at bedtime  sodium chloride 0.9% Solution for Pleural Effusion 30 milliLiter(s) IntraPleural. every 12 hours    MEDICATIONS  (PRN):  aluminum hydroxide/magnesium hydroxide/simethicone Suspension 30 milliLiter(s) Oral every 4 hours PRN Dyspepsia  melatonin 3 milliGRAM(s) Oral at bedtime PRN Insomnia  morphine   Solution 10 milliGRAM(s) Oral every 4 hours PRN Severe Pain (7 - 10)  morphine   Solution 5 milliGRAM(s) Oral every 4 hours PRN Moderate Pain (4 - 6)  ondansetron   Disintegrating Tablet 4 milliGRAM(s) Oral every 6 hours PRN Nausea and/or Vomiting  ondansetron Injectable 4 milliGRAM(s) IV Push every 8 hours PRN Nausea and/or Vomiting  sodium chloride 0.65% Nasal 1 Spray(s) Both Nostrils four times a day PRN Nasal Congestion      ITEMS UNCHECKED ARE NOT PRESENT    PRESENT SYMPTOMS: [ ]Unable to self-report - see  CPOT, PAINADS, RDOS below  Source if other than patient:  [ ]Family   [ ]Team     Pain: [x ]yes [ ]no  QOL impact - wakes her up from sleep  Location -  R chest wall                  Aggravating factors - movement  Quality - sharp  Radiation - across R chest  Timing- constant  Severity (0-10 scale): can be as severe as 8/10  Minimal acceptable level (0-10 scale): 2/10    PCSSQ[Palliative Care Spiritual Screening Question]   Severity (0-10):  Score of 4 or > indicate consideration of Chaplaincy referral.  Chaplaincy Referral: [ ] yes [x ] refused [ ] following    Caregiver [place]? : [ ] yes [ ] no  [x ] deferred:  Social work referral [ ] Patient & Family Centered Care Referral [ ]     Anticipatory Grief present?:  [ ] yes [ ] no  [ x] deferred: Social work referral [ ] Patient & Family Centered Care Referral [ ]      Other Symptoms:  [ x]All other review of systems negative , except anxiety     PHYSICAL EXAM:  Vital Signs Last 24 Hrs  T(C): 36.7 (24 Jan 2025 10:00), Max: 37.1 (23 Jan 2025 16:22)  T(F): 98 (24 Jan 2025 10:00), Max: 98.8 (23 Jan 2025 16:22)  HR: 107 (24 Jan 2025 10:00) (99 - 110)  BP: 113/62 (24 Jan 2025 10:00) (97/78 - 125/70)  BP(mean): --  RR: 18 (24 Jan 2025 10:00) (17 - 18)  SpO2: 100% (24 Jan 2025 10:00) (96% - 100%)    Parameters below as of 24 Jan 2025 10:00  Patient On (Oxygen Delivery Method): room air     I&O's Summary    23 Jan 2025 07:01  -  24 Jan 2025 07:00  --------------------------------------------------------  IN: 120 mL / OUT: 1000 mL / NET: -880 mL       GENERAL: [ ]Cachexia    [x ]Alert  [x ]Oriented x 3  [ ]Lethargic  [ ]Unarousable  [x ]Verbal  [ ]Non-Verbal  Behavioral:   [x ] Anxiety  [ ] Delirium [ ] Agitation [ ] Other  HEENT:  [x ]Normal   [ ]Dry mouth   [ ]ET Tube/Trach  [ ]Oral lesions  PULMONARY:   [ ]Clear [ ]Tachypnea  [ ]Audible excessive secretions   [ ]Rhonchi        [ ]Right [ ]Left [ ]Bilateral  [ ]Crackles        [ ]Right [ ]Left [ ]Bilateral  [ ]Wheezing     [ ]Right [ ]Left [ ]Bilateral  [x ]Diminished breath sounds [ x]right [ ]left [ ]bilateral  CARDIOVASCULAR:    [ x]Regular [ ]Irregular [ ]Tachy  [ ]Luther [ ]Murmur [ ]Other  GASTROINTESTINAL:  [ x]Soft  [ ]Distended   [x ]+BS  [ x]Non tender [ ]Tender  [ ]Other [ ]PEG [ ]OGT/ NGT  Last BM: 1/23/25 [x] Colostomy bag LLQ- stool in colostomy  GENITOURINARY:  [x ]Normal [ ] Incontinent   [ ]Oliguria/Anuria   [ ]Nj  MUSCULOSKELETAL:   [x ]Normal   [ ]Weakness  [ ]Bed/Wheelchair bound [ ]Edema  NEUROLOGIC:   [x ]No focal deficits  [ ]Cognitive impairment  [ ]Dysphagia [ ]Dysarthria [ ]Paresis [ ]Other   SKIN:   [x ]Normal  [ ]Rash  [ ]Other  [ ]Pressure ulcer(s)       Present on admission [ ]y [ ]n    CRITICAL CARE:  [ ]Shock Present  [ ]Septic [ ]Cardiogenic [ ]Neurologic [ ]Hypovolemic  [ ]Vasopressors [ ]Inotropes  [ ]Respiratory failure present [ ]Mechanical Ventilation [ ]Non-invasive ventilatory support [ ]High-Flow   [ ]Acute  [ ]Chronic [ ]Hypoxic  [ ]Hypercarbic [ ]Other  [ ]Other organ failure     LABS:                        9.6    7.33  )-----------( 180      ( 24 Jan 2025 05:30 )             32.5   01-24    137  |  102  |  14  ----------------------------<  94  4.1   |  25  |  0.71    Ca    9.4      24 Jan 2025 05:30  Phos  3.9     01-24  Mg     2.00     01-24        Urinalysis Basic - ( 24 Jan 2025 05:30 )    Color: x / Appearance: x / SG: x / pH: x  Gluc: 94 mg/dL / Ketone: x  / Bili: x / Urobili: x   Blood: x / Protein: x / Nitrite: x   Leuk Esterase: x / RBC: x / WBC x   Sq Epi: x / Non Sq Epi: x / Bacteria: x      RADIOLOGY & ADDITIONAL STUDIES: reviewed     Protein Calorie Malnutrition Present: [ ]mild [ ]moderate [ ]severe [ ]underweight [ ]morbid obesity  https://www.andeal.org/vault/2440/web/files/ONC/Table_Clinical%20Characteristics%20to%20Document%20Malnutrition-White%20JV%20et%20al%202012.pdf    Height (cm): 165.1 (01-18-25 @ 05:00), 152.4 (10-29-24 @ 23:18)  Weight (kg): 52.2 (01-17-25 @ 15:55), 54.4 (10-29-24 @ 15:36)  BMI (kg/m2): 19.2 (01-18-25 @ 05:00), 22.5 (01-17-25 @ 15:55), 23.4 (10-29-24 @ 23:18)    [ ]PPSV2 < or = 30%  [ ]significant weight loss [ ]poor nutritional intake [ ]anasarca[ ]Artificial Nutrition    Other REFERRALS:  [ ]Hospice  [ ]Child Life  [ ]Social Work  [x ]Case management [ ]Holistic Therapy     Palliative Performance Scale:  http://npcrc.org/files/news/palliative_performance_scale_ppsv2.pdf  (Ctrl +  left click to view)  Respiratory Distress Observation Tool:  https://homecareinformation.net/handouts/hen/Respiratory_Distress_Observation_Scale.pdf (Ctrl +  left click to view)  PAINAD Score:  http://[email or web address]/cog/painad.pdf (Ctrl +  left click to view)

## 2025-01-24 NOTE — PROGRESS NOTE ADULT - CONVERSATION DETAILS
Dr. Oliveros, palliative fellow, Dr. Stewart, geriatrics-palliative attending, Gemini Cruz, palliative care , met again with patient today to discuss establishing a healthcare proxy. Patient again declined to complete one despite us explaining that it would only be used in the event she could not tell us her wishes herself and would allow her to have an advocate to help make her wishes known. We introduced the idea of advanced directives and explained how CPR and intubation would work. Patient shared her initial thoughts that she would like CPR for resuscitation but would not want intubation at that time. We explained that those CPR w/o adequate oxygenation, likely requiring intubation, could be futile and could cause more suffering than harm. Pt refused to accept that and stated that she thought that is incorrect and that if CPR was successful then she should not be intubated. Patient was not sure and wanted to discuss it with her friends, whom are nurses. Dr. Oliveros, palliative fellow, Dr. Stewart, geriatrics-palliative attending, Gemini Cruz, palliative care , met again with patient today to discuss establishing a healthcare proxy. Patient again declined to complete one despite us explaining that it would only be used in the event she could not tell us her wishes herself and would allow her to have an advocate to help make her wishes known.   We introduced the idea of advanced directives and explained how CPR and intubation would work. Patient shared her initial thoughts that she would like CPR for resuscitation but would not want intubation at that time. Counseled on resuscitative process in event of cardiopulmonary arrest would involve CPR and intubation. Pt refused to accept that and stated that she thought that is incorrect and that if CPR was successful then she should not be intubated. Patient was not sure and wanted to discuss it with her friends, whom are nurses. Encouraged her to further consider .

## 2025-01-24 NOTE — CHART NOTE - NSCHARTNOTEFT_GEN_A_CORE
MIST #5 @ 2:30PM 1/24: Under aseptic technique, 10mg of Alteplase and 5mg of Dornase were introduced into the Right pleural pigtail catheter for fibrinolysis. The tube was flushed with sterile NS before and after. The PTC remained clamped x 1 h and was then left to suction.

## 2025-01-24 NOTE — PROGRESS NOTE ADULT - PROBLEM SELECTOR PLAN 5
- Symptom management as above  - GaP team will continue to follow  - In the event of newly developing, evolving, or worsening symptoms, please contact the Palliative Medicine team via pager ([facility] #[phone]).

## 2025-01-24 NOTE — PROGRESS NOTE ADULT - SUBJECTIVE AND OBJECTIVE BOX
Patient is a 74y old  Female who presents with a chief complaint of pleural effusion (23 Jan 2025 16:28)  Patient seen, pigtail cath on suction  feels pain slightly improved      MEDICATIONS  (STANDING):  acetaminophen     Tablet .. 1000 milliGRAM(s) Oral every 8 hours  alteplase  Injectable for Pleural Effusion 10 milliGRAM(s) IntraPleural. every 12 hours  artificial tears (preservative free) Ophthalmic Solution 1 Drop(s) Both EYES three times a day  benzonatate 100 milliGRAM(s) Oral three times a day  dornase lauryn Solution for Pleural Effusion 5 milliGRAM(s) IntraPleural. every 12 hours  famotidine    Tablet 20 milliGRAM(s) Oral at bedtime  gabapentin 300 milliGRAM(s) Oral <User Schedule>  gabapentin 200 milliGRAM(s) Oral <User Schedule>  heparin   Injectable 5000 Unit(s) SubCutaneous every 12 hours  influenza  Vaccine (HIGH DOSE) 0.5 milliLiter(s) IntraMuscular once  lidocaine   4% Patch 1 Patch Transdermal daily  pantoprazole    Tablet 40 milliGRAM(s) Oral before breakfast  polyethylene glycol 3350 17 Gram(s) Oral daily  senna 2 Tablet(s) Oral at bedtime  sodium chloride 0.9% Solution for Pleural Effusion 30 milliLiter(s) IntraPleural. every 12 hours  sodium chloride 0.9%. 1000 milliLiter(s) (60 mL/Hr) IV Continuous <Continuous>    MEDICATIONS  (PRN):  aluminum hydroxide/magnesium hydroxide/simethicone Suspension 30 milliLiter(s) Oral every 4 hours PRN Dyspepsia  melatonin 3 milliGRAM(s) Oral at bedtime PRN Insomnia  morphine   Solution 5 milliGRAM(s) Oral every 4 hours PRN Severe Pain (7 - 10)  ondansetron   Disintegrating Tablet 4 milliGRAM(s) Oral every 6 hours PRN Nausea and/or Vomiting  ondansetron Injectable 4 milliGRAM(s) IV Push every 8 hours PRN Nausea and/or Vomiting  sodium chloride 0.65% Nasal 1 Spray(s) Both Nostrils four times a day PRN Nasal Congestion      Vital Signs Last 24 Hrs  T(C): 37.1 (24 Jan 2025 07:55), Max: 37.1 (23 Jan 2025 16:22)  T(F): 98.8 (24 Jan 2025 07:55), Max: 98.8 (23 Jan 2025 16:22)  HR: 99 (24 Jan 2025 07:55) (99 - 110)  BP: 97/78 (24 Jan 2025 07:55) (97/78 - 125/70)  BP(mean): --  RR: 18 (24 Jan 2025 07:55) (17 - 18)  SpO2: 97% (24 Jan 2025 07:55) (96% - 97%)    Parameters below as of 24 Jan 2025 07:55  Patient On (Oxygen Delivery Method): room air        PE  NAD  Awake, alert  Anicteric, MMM  RRR  CTAB  Abd soft, NT, ND  No c/c/e  No rash grossly                            9.6    7.33  )-----------( 180      ( 24 Jan 2025 05:30 )             32.5       01-24    137  |  102  |  14  ----------------------------<  94  4.1   |  25  |  0.71    Ca    9.4      24 Jan 2025 05:30  Phos  3.9     01-24  Mg     2.00     01-24

## 2025-01-25 LAB
ALBUMIN SERPL ELPH-MCNC: 2.9 G/DL — LOW (ref 3.3–5)
ALP SERPL-CCNC: 281 U/L — HIGH (ref 40–120)
ALT FLD-CCNC: 11 U/L — SIGNIFICANT CHANGE UP (ref 4–33)
ANION GAP SERPL CALC-SCNC: 13 MMOL/L — SIGNIFICANT CHANGE UP (ref 7–14)
AST SERPL-CCNC: 11 U/L — SIGNIFICANT CHANGE UP (ref 4–32)
BASOPHILS # BLD AUTO: 0.04 K/UL — SIGNIFICANT CHANGE UP (ref 0–0.2)
BASOPHILS NFR BLD AUTO: 0.5 % — SIGNIFICANT CHANGE UP (ref 0–2)
BILIRUB SERPL-MCNC: 0.2 MG/DL — SIGNIFICANT CHANGE UP (ref 0.2–1.2)
BUN SERPL-MCNC: 22 MG/DL — SIGNIFICANT CHANGE UP (ref 7–23)
CALCIUM SERPL-MCNC: 9.3 MG/DL — SIGNIFICANT CHANGE UP (ref 8.4–10.5)
CHLORIDE SERPL-SCNC: 102 MMOL/L — SIGNIFICANT CHANGE UP (ref 98–107)
CO2 SERPL-SCNC: 22 MMOL/L — SIGNIFICANT CHANGE UP (ref 22–31)
CREAT SERPL-MCNC: 0.79 MG/DL — SIGNIFICANT CHANGE UP (ref 0.5–1.3)
EGFR: 79 ML/MIN/1.73M2 — SIGNIFICANT CHANGE UP
EOSINOPHIL # BLD AUTO: 0.05 K/UL — SIGNIFICANT CHANGE UP (ref 0–0.5)
EOSINOPHIL NFR BLD AUTO: 0.6 % — SIGNIFICANT CHANGE UP (ref 0–6)
GLUCOSE SERPL-MCNC: 97 MG/DL — SIGNIFICANT CHANGE UP (ref 70–99)
HCT VFR BLD CALC: 31.3 % — LOW (ref 34.5–45)
HGB BLD-MCNC: 9.1 G/DL — LOW (ref 11.5–15.5)
IANC: 7.66 K/UL — HIGH (ref 1.8–7.4)
IMM GRANULOCYTES NFR BLD AUTO: 0.6 % — SIGNIFICANT CHANGE UP (ref 0–0.9)
LYMPHOCYTES # BLD AUTO: 0.28 K/UL — LOW (ref 1–3.3)
LYMPHOCYTES # BLD AUTO: 3.3 % — LOW (ref 13–44)
MAGNESIUM SERPL-MCNC: 1.9 MG/DL — SIGNIFICANT CHANGE UP (ref 1.6–2.6)
MCHC RBC-ENTMCNC: 28.2 PG — SIGNIFICANT CHANGE UP (ref 27–34)
MCHC RBC-ENTMCNC: 29.1 G/DL — LOW (ref 32–36)
MCV RBC AUTO: 96.9 FL — SIGNIFICANT CHANGE UP (ref 80–100)
MONOCYTES # BLD AUTO: 0.43 K/UL — SIGNIFICANT CHANGE UP (ref 0–0.9)
MONOCYTES NFR BLD AUTO: 5.1 % — SIGNIFICANT CHANGE UP (ref 2–14)
NEUTROPHILS # BLD AUTO: 7.66 K/UL — HIGH (ref 1.8–7.4)
NEUTROPHILS NFR BLD AUTO: 89.9 % — HIGH (ref 43–77)
NRBC # BLD AUTO: 0 K/UL — SIGNIFICANT CHANGE UP (ref 0–0)
NRBC # BLD: 0 /100 WBCS — SIGNIFICANT CHANGE UP (ref 0–0)
NRBC # FLD: 0 K/UL — SIGNIFICANT CHANGE UP (ref 0–0)
NRBC BLD-RTO: 0 /100 WBCS — SIGNIFICANT CHANGE UP (ref 0–0)
PHOSPHATE SERPL-MCNC: 3.5 MG/DL — SIGNIFICANT CHANGE UP (ref 2.5–4.5)
PLATELET # BLD AUTO: 160 K/UL — SIGNIFICANT CHANGE UP (ref 150–400)
POTASSIUM SERPL-MCNC: 4.3 MMOL/L — SIGNIFICANT CHANGE UP (ref 3.5–5.3)
POTASSIUM SERPL-SCNC: 4.3 MMOL/L — SIGNIFICANT CHANGE UP (ref 3.5–5.3)
PROT SERPL-MCNC: 6.4 G/DL — SIGNIFICANT CHANGE UP (ref 6–8.3)
RBC # BLD: 3.23 M/UL — LOW (ref 3.8–5.2)
RBC # FLD: 17.2 % — HIGH (ref 10.3–14.5)
SODIUM SERPL-SCNC: 137 MMOL/L — SIGNIFICANT CHANGE UP (ref 135–145)
WBC # BLD: 8.51 K/UL — SIGNIFICANT CHANGE UP (ref 3.8–10.5)
WBC # FLD AUTO: 8.51 K/UL — SIGNIFICANT CHANGE UP (ref 3.8–10.5)

## 2025-01-25 PROCEDURE — 71045 X-RAY EXAM CHEST 1 VIEW: CPT | Mod: 26

## 2025-01-25 RX ORDER — ACETAMINOPHEN 160 MG/5ML
1000 SUSPENSION ORAL ONCE
Refills: 0 | Status: COMPLETED | OUTPATIENT
Start: 2025-01-26 | End: 2025-01-26

## 2025-01-25 RX ORDER — ACETAMINOPHEN 160 MG/5ML
1000 SUSPENSION ORAL ONCE
Refills: 0 | Status: COMPLETED | OUTPATIENT
Start: 2025-01-25 | End: 2025-01-25

## 2025-01-25 RX ADMIN — GABAPENTIN 300 MILLIGRAM(S): 800 TABLET ORAL at 10:29

## 2025-01-25 RX ADMIN — GABAPENTIN 300 MILLIGRAM(S): 800 TABLET ORAL at 21:44

## 2025-01-25 RX ADMIN — FAMOTIDINE 20 MILLIGRAM(S): 10 INJECTION INTRAVENOUS at 21:45

## 2025-01-25 RX ADMIN — ACETAMINOPHEN 1000 MILLIGRAM(S): 160 SUSPENSION ORAL at 13:42

## 2025-01-25 RX ADMIN — Medication 600 MILLIGRAM(S): at 17:27

## 2025-01-25 RX ADMIN — PANTOPRAZOLE 40 MILLIGRAM(S): 20 TABLET, DELAYED RELEASE ORAL at 06:24

## 2025-01-25 RX ADMIN — Medication 5000 UNIT(S): at 06:25

## 2025-01-25 RX ADMIN — GABAPENTIN 200 MILLIGRAM(S): 800 TABLET ORAL at 17:29

## 2025-01-25 RX ADMIN — ACETAMINOPHEN 400 MILLIGRAM(S): 160 SUSPENSION ORAL at 04:33

## 2025-01-25 RX ADMIN — Medication 100 MILLIGRAM(S): at 06:24

## 2025-01-25 RX ADMIN — ACETAMINOPHEN 1000 MILLIGRAM(S): 160 SUSPENSION ORAL at 05:00

## 2025-01-25 RX ADMIN — Medication 1 DROP(S): at 21:45

## 2025-01-25 RX ADMIN — LIDOCAINE HYDROCHLORIDE 1 PATCH: 30 CREAM TOPICAL at 12:33

## 2025-01-25 RX ADMIN — ACETAMINOPHEN 1000 MILLIGRAM(S): 160 SUSPENSION ORAL at 10:45

## 2025-01-25 RX ADMIN — Medication 1 DROP(S): at 06:24

## 2025-01-25 RX ADMIN — ACETAMINOPHEN 1000 MILLIGRAM(S): 160 SUSPENSION ORAL at 14:42

## 2025-01-25 RX ADMIN — MORPHINE SULFATE 5 MILLIGRAM(S): 60 TABLET, FILM COATED, EXTENDED RELEASE ORAL at 12:06

## 2025-01-25 RX ADMIN — ACETAMINOPHEN 1000 MILLIGRAM(S): 160 SUSPENSION ORAL at 21:45

## 2025-01-25 RX ADMIN — Medication 5000 UNIT(S): at 17:21

## 2025-01-25 RX ADMIN — MORPHINE SULFATE 5 MILLIGRAM(S): 60 TABLET, FILM COATED, EXTENDED RELEASE ORAL at 11:06

## 2025-01-25 RX ADMIN — MORPHINE SULFATE 5 MILLIGRAM(S): 60 TABLET, FILM COATED, EXTENDED RELEASE ORAL at 19:54

## 2025-01-25 RX ADMIN — LIDOCAINE HYDROCHLORIDE 1 PATCH: 30 CREAM TOPICAL at 19:04

## 2025-01-25 RX ADMIN — POLYETHYLENE GLYCOL 3350 17 GRAM(S): 17 POWDER, FOR SOLUTION ORAL at 12:33

## 2025-01-25 RX ADMIN — LIDOCAINE HYDROCHLORIDE 1 PATCH: 30 CREAM TOPICAL at 23:16

## 2025-01-25 RX ADMIN — Medication 1 DROP(S): at 13:43

## 2025-01-25 NOTE — PROGRESS NOTE ADULT - SUBJECTIVE AND OBJECTIVE BOX
Pt seen. Resting in bed. C/o pain at   PTC site occasionally. Currently in no distress.  Pt states no obvious improvement in her breathing  since PTC placement and MIST done    Vital Signs Last 24 Hrs  T(C): 36.6 (25 Jan 2025 09:55), Max: 37 (24 Jan 2025 16:23)  T(F): 97.9 (25 Jan 2025 09:55), Max: 98.6 (24 Jan 2025 16:23)  HR: 102 (25 Jan 2025 09:55) (94 - 111)  BP: 108/52 (25 Jan 2025 09:55) (102/59 - 123/69)  BP(mean): --  RR: 16 (25 Jan 2025 09:55) (15 - 18)  SpO2: 98% (25 Jan 2025 09:55) (94% - 98%)    Parameters below as of 25 Jan 2025 09:55  Patient On (Oxygen Delivery Method): room air    A+O x 3  Rt PTC site c/d/i  Rt PTC 250cc/24hrs serous fluid, on suction  No air leak  Pleural cult NGTD  CXR with improved effusion.     A/P: 74y F w PMHx Rectal cancer with local pelvic recurrence, metastases to lungs /bone and recurrent loculated R effusion.   1/20: frustrated by hospital course, agree w/ IR drainage of loculated effusion. S/p placement of Rt PTC by IR  1/25-Now completed full MIST protocol x 6 doses. CXR improved.    Plan:  -Cont PTC to suction  -Document output Q shift  -CXR in am.  -Would plan to repeat non contrast CT scan of chest tomorrow to better evaluate  for residual effusion.   Will cont to follow.

## 2025-01-25 NOTE — CHART NOTE - NSCHARTNOTEFT_GEN_A_CORE
Under aseptic technique, Alteplase 10mg and Dornase 5mg instilled via right pigtail for fibrinolysis. Sterile flush given before and after. Patient tolerated procedure well, no complication noted.   PTC remained clamped x 1 hr then unclamped and continued to strict suction.

## 2025-01-25 NOTE — PROGRESS NOTE ADULT - ASSESSMENT
74 year old female with history of recurrent rectal cancer admitted for dyspnea with right pleural effusion    1. Recurrent metastatic rectal cancer  ·	Patient follows with [facility]   ·	recent progression on FOLFOX  ·	planning to switch to lonsurf and bevacizumab  ·	s/p palliative radiation therapy to the right rib area as well as spine.    ·	no disease directed therapy as inpatient  ·	patient to follow with [facility] upon discharge    2.  Dyspnea  ·	Patient has loculated right pleural effusion  ·	 Appx 360cc clear yellow fluid aspirated. Drain placed to Pleur Evac.  ·	MIST dornase and alteplase treatments, number 6 today   ·	await pleural studies and cytology  ·	Appreciate CT surgery recs    Will cont to follow,    Aba Jordan MD   Hematology/Oncology  [facility]  [phone] (Office)  [phone] (Alt office)  Evenings and weekends please call MD on call or office

## 2025-01-25 NOTE — PROGRESS NOTE ADULT - SUBJECTIVE AND OBJECTIVE BOX
Name of Patient : TAHIR PENA  MRN: [id number]  Date of visit: 01-25-25       Subjective: Patient seen and examined. No new events except as noted.       REVIEW OF SYSTEMS:    CONSTITUTIONAL: No weakness, fevers or chills  EYES/ENT: No visual changes;  No vertigo or throat pain   NECK: No pain or stiffness  RESPIRATORY: No cough, wheezing, hemoptysis; No shortness of breath  CARDIOVASCULAR: No chest pain or palpitations  GASTROINTESTINAL: No abdominal or epigastric pain. No nausea, vomiting, or hematemesis; No diarrhea or constipation. No melena or hematochezia.  GENITOURINARY: No dysuria, frequency or hematuria  NEUROLOGICAL: No numbness or weakness  SKIN: No itching, burning, rashes, or lesions   All other review of systems is negative unless indicated above.    MEDICATIONS:  MEDICATIONS  (STANDING):  acetaminophen     Tablet .. 1000 milliGRAM(s) Oral every 8 hours  alteplase  Injectable for Pleural Effusion 10 milliGRAM(s) IntraPleural. every 12 hours  artificial tears (preservative free) Ophthalmic Solution 1 Drop(s) Both EYES three times a day  benzonatate 100 milliGRAM(s) Oral three times a day  dornase lauryn Solution for Pleural Effusion 5 milliGRAM(s) IntraPleural. every 12 hours  famotidine    Tablet 20 milliGRAM(s) Oral at bedtime  gabapentin 300 milliGRAM(s) Oral <User Schedule>  gabapentin 200 milliGRAM(s) Oral <User Schedule>  heparin   Injectable 5000 Unit(s) SubCutaneous every 12 hours  lidocaine   4% Patch 1 Patch Transdermal daily  pantoprazole    Tablet 40 milliGRAM(s) Oral before breakfast  polyethylene glycol 3350 17 Gram(s) Oral daily  senna 2 Tablet(s) Oral at bedtime  sodium chloride 0.9% Solution for Pleural Effusion 30 milliLiter(s) IntraPleural. every 12 hours      PHYSICAL EXAM:  T(C): 37.1 (01-25-25 @ 21:45), Max: 37.1 (01-25-25 @ 21:45)  HR: 101 (01-25-25 @ 21:45) (94 - 107)  BP: 110/68 (01-25-25 @ 21:45) (107/53 - 112/67)  RR: 15 (01-25-25 @ 21:45) (15 - 18)  SpO2: 97% (01-25-25 @ 21:45) (97% - 98%)  Wt(kg): --  I&O's Summary    24 Jan 2025 07:01 - 25 Jan 2025 07:00  --------------------------------------------------------  IN: 0 mL / OUT: 675 mL / NET: -675 mL    25 Jan 2025 07:01 - 25 Jan 2025 23:42  --------------------------------------------------------  IN: 0 mL / OUT: 0 mL / NET: 0 mL          Appearance: Normal	  HEENT:  PERRLA   Lymphatic: No lymphadenopathy   Cardiovascular: Normal S1 S2, no JVD  Respiratory: normal effort , clear  Gastrointestinal:  Soft, Non-tender  Skin: No rashes,  warm to touch  Psychiatry:  Mood & affect appropriate  Musculuskeletal: No edema    recent labs, Imaging and EKGs personally reviewed   CODE status discussed with the patient in detail    01-24-25 @ 07:01  -  01-25-25 @ 07:00  --------------------------------------------------------  IN: 0 mL / OUT: 675 mL / NET: -675 mL    01-25-25 @ 07:01  -  01-25-25 @ 23:42  --------------------------------------------------------  IN: 0 mL / OUT: 0 mL / NET: 0 mL                          9.1    8.51  )-----------( 160      ( 25 Jan 2025 06:30 )             31.3               01-25    137  |  102  |  22  ----------------------------<  97  4.3   |  22  |  0.79    Ca    9.3      25 Jan 2025 06:40  Phos  3.5     01-25  Mg     1.90     01-25    TPro  6.4  /  Alb  2.9[L]  /  TBili  0.2  /  DBili  x   /  AST  11  /  ALT  11  /  AlkPhos  281[H]  01-25                       Urinalysis Basic - ( 25 Jan 2025 06:40 )    Color: x / Appearance: x / SG: x / pH: x  Gluc: 97 mg/dL / Ketone: x  / Bili: x / Urobili: x   Blood: x / Protein: x / Nitrite: x   Leuk Esterase: x / RBC: x / WBC x   Sq Epi: x / Non Sq Epi: x / Bacteria: x

## 2025-01-25 NOTE — PROGRESS NOTE ADULT - SUBJECTIVE AND OBJECTIVE BOX
INTERVAL HPI/OVERNIGHT EVENTS:  Patient S&E at bedside. No o/n events,     VITAL SIGNS:  T(F): 98.2 (01-25-25 @ 06:30)  HR: 94 (01-25-25 @ 06:30)  BP: 111/62 (01-25-25 @ 06:30)  RR: 15 (01-25-25 @ 06:30)  SpO2: 97% (01-25-25 @ 06:30)  Wt(kg): --    PHYSICAL EXAM:    Constitutional: NAD  Eyes: EOMI, sclera non-icteric  Neck: supple  Respiratory: CTAB, no wheezes or crackles   Cardiovascular: RRR  Gastrointestinal: soft, NTND, + BS  Extremities: no cyanosis, clubbing or edema   Neurological: awake and alert      MEDICATIONS  (STANDING):  acetaminophen     Tablet .. 1000 milliGRAM(s) Oral every 8 hours  alteplase  Injectable for Pleural Effusion 10 milliGRAM(s) IntraPleural. every 12 hours  artificial tears (preservative free) Ophthalmic Solution 1 Drop(s) Both EYES three times a day  benzonatate 100 milliGRAM(s) Oral three times a day  dornase lauryn Solution for Pleural Effusion 5 milliGRAM(s) IntraPleural. every 12 hours  famotidine    Tablet 20 milliGRAM(s) Oral at bedtime  gabapentin 300 milliGRAM(s) Oral <User Schedule>  gabapentin 200 milliGRAM(s) Oral <User Schedule>  heparin   Injectable 5000 Unit(s) SubCutaneous every 12 hours  influenza  Vaccine (HIGH DOSE) 0.5 milliLiter(s) IntraMuscular once  lidocaine   4% Patch 1 Patch Transdermal daily  pantoprazole    Tablet 40 milliGRAM(s) Oral before breakfast  polyethylene glycol 3350 17 Gram(s) Oral daily  senna 2 Tablet(s) Oral at bedtime  sodium chloride 0.9% Solution for Pleural Effusion 30 milliLiter(s) IntraPleural. every 12 hours    MEDICATIONS  (PRN):  aluminum hydroxide/magnesium hydroxide/simethicone Suspension 30 milliLiter(s) Oral every 4 hours PRN Dyspepsia  melatonin 3 milliGRAM(s) Oral at bedtime PRN Insomnia  morphine   Solution 10 milliGRAM(s) Oral every 4 hours PRN Severe Pain (7 - 10)  morphine   Solution 5 milliGRAM(s) Oral every 4 hours PRN Moderate Pain (4 - 6)  ondansetron   Disintegrating Tablet 4 milliGRAM(s) Oral every 6 hours PRN Nausea and/or Vomiting  ondansetron Injectable 4 milliGRAM(s) IV Push every 8 hours PRN Nausea and/or Vomiting  sodium chloride 0.65% Nasal 1 Spray(s) Both Nostrils four times a day PRN Nasal Congestion      Allergies    sulfa drugs (Pruritus)    Intolerances        LABS:                        9.1    8.51  )-----------( 160      ( 25 Jan 2025 06:30 )             31.3     01-25    137  |  102  |  22  ----------------------------<  97  4.3   |  22  |  0.79    Ca    9.3      25 Jan 2025 06:40  Phos  3.5     01-25  Mg     1.90     01-25    TPro  6.4  /  Alb  2.9[L]  /  TBili  0.2  /  DBili  x   /  AST  11  /  ALT  11  /  AlkPhos  281[H]  01-25      Urinalysis Basic - ( 25 Jan 2025 06:40 )    Color: x / Appearance: x / SG: x / pH: x  Gluc: 97 mg/dL / Ketone: x  / Bili: x / Urobili: x   Blood: x / Protein: x / Nitrite: x   Leuk Esterase: x / RBC: x / WBC x   Sq Epi: x / Non Sq Epi: x / Bacteria: x        RADIOLOGY & ADDITIONAL TESTS:  Studies reviewed.

## 2025-01-26 LAB
ALBUMIN SERPL ELPH-MCNC: 2.8 G/DL — LOW (ref 3.3–5)
ALP SERPL-CCNC: 355 U/L — HIGH (ref 40–120)
ALT FLD-CCNC: 16 U/L — SIGNIFICANT CHANGE UP (ref 4–33)
ANION GAP SERPL CALC-SCNC: 13 MMOL/L — SIGNIFICANT CHANGE UP (ref 7–14)
AST SERPL-CCNC: 20 U/L — SIGNIFICANT CHANGE UP (ref 4–32)
BASOPHILS # BLD AUTO: 0.03 K/UL — SIGNIFICANT CHANGE UP (ref 0–0.2)
BASOPHILS NFR BLD AUTO: 0.4 % — SIGNIFICANT CHANGE UP (ref 0–2)
BILIRUB SERPL-MCNC: 0.2 MG/DL — SIGNIFICANT CHANGE UP (ref 0.2–1.2)
BUN SERPL-MCNC: 22 MG/DL — SIGNIFICANT CHANGE UP (ref 7–23)
CALCIUM SERPL-MCNC: 9.2 MG/DL — SIGNIFICANT CHANGE UP (ref 8.4–10.5)
CHLORIDE SERPL-SCNC: 104 MMOL/L — SIGNIFICANT CHANGE UP (ref 98–107)
CO2 SERPL-SCNC: 23 MMOL/L — SIGNIFICANT CHANGE UP (ref 22–31)
CREAT SERPL-MCNC: 0.67 MG/DL — SIGNIFICANT CHANGE UP (ref 0.5–1.3)
CULTURE RESULTS: SIGNIFICANT CHANGE UP
EGFR: 93 ML/MIN/1.73M2 — SIGNIFICANT CHANGE UP
EOSINOPHIL # BLD AUTO: 0.11 K/UL — SIGNIFICANT CHANGE UP (ref 0–0.5)
EOSINOPHIL NFR BLD AUTO: 1.3 % — SIGNIFICANT CHANGE UP (ref 0–6)
GLUCOSE SERPL-MCNC: 87 MG/DL — SIGNIFICANT CHANGE UP (ref 70–99)
HCT VFR BLD CALC: 27.7 % — LOW (ref 34.5–45)
HGB BLD-MCNC: 8.5 G/DL — LOW (ref 11.5–15.5)
IANC: 7.4 K/UL — SIGNIFICANT CHANGE UP (ref 1.8–7.4)
IMM GRANULOCYTES NFR BLD AUTO: 0.5 % — SIGNIFICANT CHANGE UP (ref 0–0.9)
LYMPHOCYTES # BLD AUTO: 0.41 K/UL — LOW (ref 1–3.3)
LYMPHOCYTES # BLD AUTO: 4.9 % — LOW (ref 13–44)
MAGNESIUM SERPL-MCNC: 2 MG/DL — SIGNIFICANT CHANGE UP (ref 1.6–2.6)
MCHC RBC-ENTMCNC: 29.6 PG — SIGNIFICANT CHANGE UP (ref 27–34)
MCHC RBC-ENTMCNC: 30.7 G/DL — LOW (ref 32–36)
MCV RBC AUTO: 96.5 FL — SIGNIFICANT CHANGE UP (ref 80–100)
MONOCYTES # BLD AUTO: 0.46 K/UL — SIGNIFICANT CHANGE UP (ref 0–0.9)
MONOCYTES NFR BLD AUTO: 5.4 % — SIGNIFICANT CHANGE UP (ref 2–14)
NEUTROPHILS # BLD AUTO: 7.4 K/UL — SIGNIFICANT CHANGE UP (ref 1.8–7.4)
NEUTROPHILS NFR BLD AUTO: 87.5 % — HIGH (ref 43–77)
NRBC # BLD AUTO: 0 K/UL — SIGNIFICANT CHANGE UP (ref 0–0)
NRBC # BLD: 0 /100 WBCS — SIGNIFICANT CHANGE UP (ref 0–0)
NRBC # FLD: 0 K/UL — SIGNIFICANT CHANGE UP (ref 0–0)
NRBC BLD-RTO: 0 /100 WBCS — SIGNIFICANT CHANGE UP (ref 0–0)
PHOSPHATE SERPL-MCNC: 3.3 MG/DL — SIGNIFICANT CHANGE UP (ref 2.5–4.5)
PLATELET # BLD AUTO: 151 K/UL — SIGNIFICANT CHANGE UP (ref 150–400)
POTASSIUM SERPL-MCNC: 4.1 MMOL/L — SIGNIFICANT CHANGE UP (ref 3.5–5.3)
POTASSIUM SERPL-SCNC: 4.1 MMOL/L — SIGNIFICANT CHANGE UP (ref 3.5–5.3)
PROT SERPL-MCNC: 6.1 G/DL — SIGNIFICANT CHANGE UP (ref 6–8.3)
RBC # BLD: 2.87 M/UL — LOW (ref 3.8–5.2)
RBC # FLD: 17 % — HIGH (ref 10.3–14.5)
SODIUM SERPL-SCNC: 140 MMOL/L — SIGNIFICANT CHANGE UP (ref 135–145)
SPECIMEN SOURCE: SIGNIFICANT CHANGE UP
WBC # BLD: 8.45 K/UL — SIGNIFICANT CHANGE UP (ref 3.8–10.5)
WBC # FLD AUTO: 8.45 K/UL — SIGNIFICANT CHANGE UP (ref 3.8–10.5)

## 2025-01-26 PROCEDURE — 99231 SBSQ HOSP IP/OBS SF/LOW 25: CPT

## 2025-01-26 PROCEDURE — 71045 X-RAY EXAM CHEST 1 VIEW: CPT | Mod: 26

## 2025-01-26 PROCEDURE — 71250 CT THORAX DX C-: CPT | Mod: 26

## 2025-01-26 RX ADMIN — Medication 1 DROP(S): at 13:31

## 2025-01-26 RX ADMIN — PANTOPRAZOLE 40 MILLIGRAM(S): 20 TABLET, DELAYED RELEASE ORAL at 06:22

## 2025-01-26 RX ADMIN — Medication 100 MILLIGRAM(S): at 13:31

## 2025-01-26 RX ADMIN — MORPHINE SULFATE 5 MILLIGRAM(S): 60 TABLET, FILM COATED, EXTENDED RELEASE ORAL at 11:10

## 2025-01-26 RX ADMIN — ACETAMINOPHEN 400 MILLIGRAM(S): 160 SUSPENSION ORAL at 06:21

## 2025-01-26 RX ADMIN — GABAPENTIN 300 MILLIGRAM(S): 800 TABLET ORAL at 21:34

## 2025-01-26 RX ADMIN — ACETAMINOPHEN 1000 MILLIGRAM(S): 160 SUSPENSION ORAL at 21:33

## 2025-01-26 RX ADMIN — FAMOTIDINE 20 MILLIGRAM(S): 10 INJECTION INTRAVENOUS at 21:34

## 2025-01-26 RX ADMIN — Medication 5000 UNIT(S): at 06:21

## 2025-01-26 RX ADMIN — GABAPENTIN 300 MILLIGRAM(S): 800 TABLET ORAL at 08:42

## 2025-01-26 RX ADMIN — Medication 1 DROP(S): at 06:21

## 2025-01-26 RX ADMIN — ACETAMINOPHEN 1000 MILLIGRAM(S): 160 SUSPENSION ORAL at 22:33

## 2025-01-26 RX ADMIN — MORPHINE SULFATE 5 MILLIGRAM(S): 60 TABLET, FILM COATED, EXTENDED RELEASE ORAL at 19:38

## 2025-01-26 RX ADMIN — Medication 5000 UNIT(S): at 21:33

## 2025-01-26 RX ADMIN — Medication 100 MILLIGRAM(S): at 21:34

## 2025-01-26 RX ADMIN — ACETAMINOPHEN 1000 MILLIGRAM(S): 160 SUSPENSION ORAL at 07:15

## 2025-01-26 RX ADMIN — Medication 1 DROP(S): at 21:35

## 2025-01-26 RX ADMIN — GABAPENTIN 200 MILLIGRAM(S): 800 TABLET ORAL at 16:04

## 2025-01-26 RX ADMIN — ACETAMINOPHEN 1000 MILLIGRAM(S): 160 SUSPENSION ORAL at 13:31

## 2025-01-26 NOTE — PROGRESS NOTE ADULT - SUBJECTIVE AND OBJECTIVE BOX
INTERVAL HPI/OVERNIGHT EVENTS:  Patient S&E at bedside. No o/n events. MIST x6 completed. PTC ot suction. Plan for CT and XR today. Pain present but better controlled.     VITAL SIGNS:  T(F): 98.7 (01-25-25 @ 21:45)  HR: 94 (01-26-25 @ 06:15)  BP: 109/68 (01-26-25 @ 06:15)  RR: 14 (01-26-25 @ 06:15)  SpO2: 98% (01-26-25 @ 06:15)  Wt(kg): --    PHYSICAL EXAM:    Constitutional: NAD  Eyes: EOMI, sclera non-icteric  Neck: supple  Respiratory: decreased breath sounds on right with some crackles. PTC in place on suction.  Cardiovascular: RRR  Gastrointestinal: soft, NTND, + BS  Extremities: no cyanosis, clubbing or edema   Neurological: awake and alert      MEDICATIONS  (STANDING):  acetaminophen     Tablet .. 1000 milliGRAM(s) Oral every 8 hours  alteplase  Injectable for Pleural Effusion 10 milliGRAM(s) IntraPleural. every 12 hours  artificial tears (preservative free) Ophthalmic Solution 1 Drop(s) Both EYES three times a day  benzonatate 100 milliGRAM(s) Oral three times a day  dornase lauryn Solution for Pleural Effusion 5 milliGRAM(s) IntraPleural. every 12 hours  famotidine    Tablet 20 milliGRAM(s) Oral at bedtime  gabapentin 300 milliGRAM(s) Oral <User Schedule>  gabapentin 200 milliGRAM(s) Oral <User Schedule>  heparin   Injectable 5000 Unit(s) SubCutaneous every 12 hours  lidocaine   4% Patch 1 Patch Transdermal daily  pantoprazole    Tablet 40 milliGRAM(s) Oral before breakfast  polyethylene glycol 3350 17 Gram(s) Oral daily  senna 2 Tablet(s) Oral at bedtime  sodium chloride 0.9% Solution for Pleural Effusion 30 milliLiter(s) IntraPleural. every 12 hours    MEDICATIONS  (PRN):  aluminum hydroxide/magnesium hydroxide/simethicone Suspension 30 milliLiter(s) Oral every 4 hours PRN Dyspepsia  guaiFENesin  milliGRAM(s) Oral every 12 hours PRN Cough  melatonin 3 milliGRAM(s) Oral at bedtime PRN Insomnia  morphine   Solution 10 milliGRAM(s) Oral every 4 hours PRN Severe Pain (7 - 10)  morphine   Solution 5 milliGRAM(s) Oral every 4 hours PRN Moderate Pain (4 - 6)  ondansetron   Disintegrating Tablet 4 milliGRAM(s) Oral every 6 hours PRN Nausea and/or Vomiting  ondansetron Injectable 4 milliGRAM(s) IV Push every 8 hours PRN Nausea and/or Vomiting  sodium chloride 0.65% Nasal 1 Spray(s) Both Nostrils four times a day PRN Nasal Congestion      Allergies    sulfa drugs (Pruritus)    Intolerances        LABS:                        9.1    8.51  )-----------( 160      ( 25 Jan 2025 06:30 )             31.3     01-25    137  |  102  |  22  ----------------------------<  97  4.3   |  22  |  0.79    Ca    9.3      25 Jan 2025 06:40  Phos  3.5     01-25  Mg     1.90     01-25    TPro  6.4  /  Alb  2.9[L]  /  TBili  0.2  /  DBili  x   /  AST  11  /  ALT  11  /  AlkPhos  281[H]  01-25      Urinalysis Basic - ( 25 Jan 2025 06:40 )    Color: x / Appearance: x / SG: x / pH: x  Gluc: 97 mg/dL / Ketone: x  / Bili: x / Urobili: x   Blood: x / Protein: x / Nitrite: x   Leuk Esterase: x / RBC: x / WBC x   Sq Epi: x / Non Sq Epi: x / Bacteria: x        RADIOLOGY & ADDITIONAL TESTS:  Studies reviewed.

## 2025-01-26 NOTE — DIETITIAN INITIAL EVALUATION ADULT - PERTINENT MEDS FT
MEDICATIONS  (STANDING):  acetaminophen     Tablet .. 1000 milliGRAM(s) Oral every 8 hours  alteplase  Injectable for Pleural Effusion 10 milliGRAM(s) IntraPleural. every 12 hours  artificial tears (preservative free) Ophthalmic Solution 1 Drop(s) Both EYES three times a day  benzonatate 100 milliGRAM(s) Oral three times a day  dornase lauryn Solution for Pleural Effusion 5 milliGRAM(s) IntraPleural. every 12 hours  famotidine    Tablet 20 milliGRAM(s) Oral at bedtime  gabapentin 300 milliGRAM(s) Oral <User Schedule>  gabapentin 200 milliGRAM(s) Oral <User Schedule>  heparin   Injectable 5000 Unit(s) SubCutaneous every 12 hours  lidocaine   4% Patch 1 Patch Transdermal daily  pantoprazole    Tablet 40 milliGRAM(s) Oral before breakfast  polyethylene glycol 3350 17 Gram(s) Oral daily  senna 2 Tablet(s) Oral at bedtime  sodium chloride 0.9% Solution for Pleural Effusion 30 milliLiter(s) IntraPleural. every 12 hours    MEDICATIONS  (PRN):  aluminum hydroxide/magnesium hydroxide/simethicone Suspension 30 milliLiter(s) Oral every 4 hours PRN Dyspepsia  guaiFENesin  milliGRAM(s) Oral every 12 hours PRN Cough  melatonin 3 milliGRAM(s) Oral at bedtime PRN Insomnia  morphine   Solution 10 milliGRAM(s) Oral every 4 hours PRN Severe Pain (7 - 10)  morphine   Solution 5 milliGRAM(s) Oral every 4 hours PRN Moderate Pain (4 - 6)  ondansetron   Disintegrating Tablet 4 milliGRAM(s) Oral every 6 hours PRN Nausea and/or Vomiting  ondansetron Injectable 4 milliGRAM(s) IV Push every 8 hours PRN Nausea and/or Vomiting  sodium chloride 0.65% Nasal 1 Spray(s) Both Nostrils four times a day PRN Nasal Congestion

## 2025-01-26 NOTE — DIETITIAN INITIAL EVALUATION ADULT - PROBLEM SELECTOR PLAN 3
Cancer-associated.  - Continue lovenox 60mg daily, which is slightly less than 1.5mg/kg daily but consistent with home regimen  - Will need accurate weight to see if dose needs adjustment; ideally, pt would be agreeable to 1mg/kg BID for more reliable blood concentration  - Will need to hold lovenox for 24 hours before thoracentesis

## 2025-01-26 NOTE — DIETITIAN INITIAL EVALUATION ADULT - PROBLEM SELECTOR PLAN 2
Metastatic, in process of transitioning chemotherapy regimens.  - Appreciate Heme/Onc consult  - Outpatient treatment through [facility]  - Continue tramadol 100mg TID  - Continue tylenol TID  - Continue lidoderm to right upper back  - Pt states she now takes gabapentin 300mg qAM, 200mg midday, and 300mg qPM; will adjust orders  - Will provide bowel regimen and PPI/H2-blocker per home regimen

## 2025-01-26 NOTE — DIETITIAN INITIAL EVALUATION ADULT - PERTINENT LABORATORY DATA
01-26    140  |  104  |  22  ----------------------------<  87  4.1   |  23  |  0.67    Ca    9.2      26 Jan 2025 05:37  Phos  3.3     01-26  Mg     2.00     01-26    TPro  6.1  /  Alb  2.8[L]  /  TBili  0.2  /  DBili  x   /  AST  20  /  ALT  16  /  AlkPhos  355[H]  01-26

## 2025-01-26 NOTE — PROGRESS NOTE ADULT - ASSESSMENT
74 year old female with history of recurrent rectal cancer admitted for dyspnea with right pleural effusion    1. Recurrent metastatic rectal cancer  ·	[place] [facility]   ·	recent progression on FOLFOX  ·	planning to switch to lonsurf and bevacizumab  ·	s/p palliative radiation therapy to the right rib area as well as spine.    ·	no disease directed therapy as inpatient  ·	patient to [place] [facility] [place]    2.  Dyspnea  ·	Patient has loculated right pleural effusion  ·	 Appx 360cc clear yellow fluid aspirated. Drain placed to Pleur Evac.  ·	MIST dornase and alteplase treatments, completed protocol x6 treatments. Plan for repeat CT and XR today. Improvement noted on 1/24 XR    ·	await pleural studies and cytology  ·	Appreciate CT surgery recs    Will cont to follow,    Aba Jordan MD   Hematology/Oncology  [facility]  [phone] (Office)  [phone] (Alt office)  Evenings and weekends please call MD on call or office

## 2025-01-26 NOTE — PROGRESS NOTE ADULT - SUBJECTIVE AND OBJECTIVE BOX
Pt seen. Resting in bed.   pt admits discomfort at PTC site   Currently in no distress.  s/p MIST x 6 doses  repeat CT chest today    Vital Signs Last 24 Hrs  T(C): 36.6 (25 Jan 2025 09:55), Max: 37 (24 Jan 2025 16:23)  T(F): 97.9 (25 Jan 2025 09:55), Max: 98.6 (24 Jan 2025 16:23)  HR: 102 (25 Jan 2025 09:55) (94 - 111)  BP: 108/52 (25 Jan 2025 09:55) (102/59 - 123/69)  BP(mean): --  RR: 16 (25 Jan 2025 09:55) (15 - 18)  SpO2: 98% (25 Jan 2025 09:55) (94% - 98%)    Parameters below as of 25 Jan 2025 09:55  Patient On (Oxygen Delivery Method): room air    PE  A+O x 3  Rt PTC site c/d/i  Rt PTC 25cc/24hrs serous fluid, on suction  No air leak  Pleural cult NGTD  CXR with improved effusion.     A/P: 74y F w PMHx Rectal cancer with local pelvic recurrence, metastases to lungs /bone and recurrent loculated R effusion.   1/20: frustrated by hospital course, agree w/ IR drainage of loculated effusion. S/p placement of Rt PTC by IR  1/25-Now completed full MIST protocol x 6 doses. CXR improved. CT chest today    Plan:  - Cont PTC to suction  - Document output Q shift  - will review CT chest with thoracic team and follow with recommendations  - rest of care as per primary medical team  Will cont to follow.

## 2025-01-26 NOTE — DIETITIAN INITIAL EVALUATION ADULT - ORAL INTAKE PTA/DIET HISTORY
Patient seen for assessment. Reports overall good appetite at home. Has an aide and friends helping w/ meals. Consumes ensure enlive sometimes.

## 2025-01-26 NOTE — DIETITIAN INITIAL EVALUATION ADULT - NSFNSGIIOFT_GEN_A_CORE
01-25-25 @ 07:01  -  01-26-25 @ 07:00  --------------------------------------------------------  OUT:    Chest Tube (mL): 25 mL  Total OUT: 25 mL    Total NET: -25 mL

## 2025-01-26 NOTE — PROVIDER CONTACT NOTE (OTHER) - SITUATION
Provider notified of +1 edema on patients right foot.
Patient was yelling during vital sign collection and had HR of 110

## 2025-01-26 NOTE — DIETITIAN INITIAL EVALUATION ADULT - OTHER INFO
74 year old female with a PMH of rectal cancer with bony and lung metastases s/p RT currently on chemotherapy presents after finding of right-sided pleural effusion per chart.    Patient reporting good appetite. Intakes are % per RN flow sheet, mostly due to food preferences which was reviewed. No GI distress reported. has no food allergies. UBW has been around 115 lbs. Per HIE, noted w/ weight 54.4 kg (10/29/24). ABW is 52.1 kg (1/18) per chart indicating a -4.3% weight loss x 3 months, not significant. No edema or pressure injuries noted per RN flow sheet.    Patient w/ no questions regarding nutrition at this time.

## 2025-01-26 NOTE — DIETITIAN INITIAL EVALUATION ADULT - NS FNS DIET ORDER
Diet, Regular:   Supplement Feeding Modality:  Oral  Ensure Enlive Cans or Servings Per Day:  1       Frequency:  Three Times a day (01-26-25 @ 08:45)

## 2025-01-27 LAB
ALBUMIN SERPL ELPH-MCNC: 2.7 G/DL — LOW (ref 3.3–5)
ALP SERPL-CCNC: 379 U/L — HIGH (ref 40–120)
ALT FLD-CCNC: 19 U/L — SIGNIFICANT CHANGE UP (ref 4–33)
ANION GAP SERPL CALC-SCNC: 11 MMOL/L — SIGNIFICANT CHANGE UP (ref 7–14)
AST SERPL-CCNC: 25 U/L — SIGNIFICANT CHANGE UP (ref 4–32)
BASOPHILS # BLD AUTO: 0.03 K/UL — SIGNIFICANT CHANGE UP (ref 0–0.2)
BASOPHILS NFR BLD AUTO: 0.4 % — SIGNIFICANT CHANGE UP (ref 0–2)
BILIRUB SERPL-MCNC: 0.2 MG/DL — SIGNIFICANT CHANGE UP (ref 0.2–1.2)
BUN SERPL-MCNC: 19 MG/DL — SIGNIFICANT CHANGE UP (ref 7–23)
CALCIUM SERPL-MCNC: 9.3 MG/DL — SIGNIFICANT CHANGE UP (ref 8.4–10.5)
CHLORIDE SERPL-SCNC: 102 MMOL/L — SIGNIFICANT CHANGE UP (ref 98–107)
CO2 SERPL-SCNC: 23 MMOL/L — SIGNIFICANT CHANGE UP (ref 22–31)
CREAT SERPL-MCNC: 0.6 MG/DL — SIGNIFICANT CHANGE UP (ref 0.5–1.3)
EGFR: 95 ML/MIN/1.73M2 — SIGNIFICANT CHANGE UP
EOSINOPHIL # BLD AUTO: 0.05 K/UL — SIGNIFICANT CHANGE UP (ref 0–0.5)
EOSINOPHIL NFR BLD AUTO: 0.7 % — SIGNIFICANT CHANGE UP (ref 0–6)
GLUCOSE SERPL-MCNC: 88 MG/DL — SIGNIFICANT CHANGE UP (ref 70–99)
HCT VFR BLD CALC: 27.4 % — LOW (ref 34.5–45)
HGB BLD-MCNC: 8.6 G/DL — LOW (ref 11.5–15.5)
IANC: 6.54 K/UL — SIGNIFICANT CHANGE UP (ref 1.8–7.4)
IMM GRANULOCYTES NFR BLD AUTO: 0.7 % — SIGNIFICANT CHANGE UP (ref 0–0.9)
LYMPHOCYTES # BLD AUTO: 0.27 K/UL — LOW (ref 1–3.3)
LYMPHOCYTES # BLD AUTO: 3.7 % — LOW (ref 13–44)
MAGNESIUM SERPL-MCNC: 1.9 MG/DL — SIGNIFICANT CHANGE UP (ref 1.6–2.6)
MCHC RBC-ENTMCNC: 29.7 PG — SIGNIFICANT CHANGE UP (ref 27–34)
MCHC RBC-ENTMCNC: 31.4 G/DL — LOW (ref 32–36)
MCV RBC AUTO: 94.5 FL — SIGNIFICANT CHANGE UP (ref 80–100)
MONOCYTES # BLD AUTO: 0.45 K/UL — SIGNIFICANT CHANGE UP (ref 0–0.9)
MONOCYTES NFR BLD AUTO: 6.1 % — SIGNIFICANT CHANGE UP (ref 2–14)
NEUTROPHILS # BLD AUTO: 6.54 K/UL — SIGNIFICANT CHANGE UP (ref 1.8–7.4)
NEUTROPHILS NFR BLD AUTO: 88.4 % — HIGH (ref 43–77)
NRBC # BLD AUTO: 0 K/UL — SIGNIFICANT CHANGE UP (ref 0–0)
NRBC # BLD: 0 /100 WBCS — SIGNIFICANT CHANGE UP (ref 0–0)
NRBC # FLD: 0 K/UL — SIGNIFICANT CHANGE UP (ref 0–0)
NRBC BLD-RTO: 0 /100 WBCS — SIGNIFICANT CHANGE UP (ref 0–0)
PHOSPHATE SERPL-MCNC: 3.4 MG/DL — SIGNIFICANT CHANGE UP (ref 2.5–4.5)
PLATELET # BLD AUTO: 150 K/UL — SIGNIFICANT CHANGE UP (ref 150–400)
POTASSIUM SERPL-MCNC: 4 MMOL/L — SIGNIFICANT CHANGE UP (ref 3.5–5.3)
POTASSIUM SERPL-SCNC: 4 MMOL/L — SIGNIFICANT CHANGE UP (ref 3.5–5.3)
PROT SERPL-MCNC: 6.2 G/DL — SIGNIFICANT CHANGE UP (ref 6–8.3)
RBC # BLD: 2.9 M/UL — LOW (ref 3.8–5.2)
RBC # FLD: 17.2 % — HIGH (ref 10.3–14.5)
SODIUM SERPL-SCNC: 136 MMOL/L — SIGNIFICANT CHANGE UP (ref 135–145)
WBC # BLD: 7.39 K/UL — SIGNIFICANT CHANGE UP (ref 3.8–10.5)
WBC # FLD AUTO: 7.39 K/UL — SIGNIFICANT CHANGE UP (ref 3.8–10.5)

## 2025-01-27 PROCEDURE — 93970 EXTREMITY STUDY: CPT | Mod: 26

## 2025-01-27 PROCEDURE — 71045 X-RAY EXAM CHEST 1 VIEW: CPT | Mod: 26,77

## 2025-01-27 PROCEDURE — 71045 X-RAY EXAM CHEST 1 VIEW: CPT | Mod: 26

## 2025-01-27 PROCEDURE — 99231 SBSQ HOSP IP/OBS SF/LOW 25: CPT

## 2025-01-27 PROCEDURE — 99232 SBSQ HOSP IP/OBS MODERATE 35: CPT

## 2025-01-27 PROCEDURE — 99222 1ST HOSP IP/OBS MODERATE 55: CPT

## 2025-01-27 RX ORDER — LIDOCAINE HYDROCHLORIDE 30 MG/G
1 CREAM TOPICAL DAILY
Refills: 0 | Status: DISCONTINUED | OUTPATIENT
Start: 2025-01-27 | End: 2025-01-29

## 2025-01-27 RX ORDER — MORPHINE SULFATE 60 MG/1
5 TABLET, FILM COATED, EXTENDED RELEASE ORAL EVERY 4 HOURS
Refills: 0 | Status: DISCONTINUED | OUTPATIENT
Start: 2025-01-27 | End: 2025-01-30

## 2025-01-27 RX ORDER — ACETAMINOPHEN 160 MG/5ML
650 SUSPENSION ORAL ONCE
Refills: 0 | Status: COMPLETED | OUTPATIENT
Start: 2025-01-27 | End: 2025-01-27

## 2025-01-27 RX ADMIN — FAMOTIDINE 20 MILLIGRAM(S): 10 INJECTION INTRAVENOUS at 21:14

## 2025-01-27 RX ADMIN — LIDOCAINE HYDROCHLORIDE 1 PATCH: 30 CREAM TOPICAL at 12:35

## 2025-01-27 RX ADMIN — MORPHINE SULFATE 5 MILLIGRAM(S): 60 TABLET, FILM COATED, EXTENDED RELEASE ORAL at 11:39

## 2025-01-27 RX ADMIN — ACETAMINOPHEN 1000 MILLIGRAM(S): 160 SUSPENSION ORAL at 15:09

## 2025-01-27 RX ADMIN — Medication 5000 UNIT(S): at 17:53

## 2025-01-27 RX ADMIN — MORPHINE SULFATE 5 MILLIGRAM(S): 60 TABLET, FILM COATED, EXTENDED RELEASE ORAL at 15:50

## 2025-01-27 RX ADMIN — GABAPENTIN 300 MILLIGRAM(S): 800 TABLET ORAL at 09:31

## 2025-01-27 RX ADMIN — Medication 1 DROP(S): at 21:15

## 2025-01-27 RX ADMIN — ACETAMINOPHEN 260 MILLIGRAM(S): 160 SUSPENSION ORAL at 06:32

## 2025-01-27 RX ADMIN — Medication 100 MILLIGRAM(S): at 06:24

## 2025-01-27 RX ADMIN — GABAPENTIN 300 MILLIGRAM(S): 800 TABLET ORAL at 21:14

## 2025-01-27 RX ADMIN — GABAPENTIN 200 MILLIGRAM(S): 800 TABLET ORAL at 15:00

## 2025-01-27 RX ADMIN — ACETAMINOPHEN 650 MILLIGRAM(S): 160 SUSPENSION ORAL at 06:56

## 2025-01-27 RX ADMIN — ACETAMINOPHEN 1000 MILLIGRAM(S): 160 SUSPENSION ORAL at 14:09

## 2025-01-27 RX ADMIN — Medication 5000 UNIT(S): at 06:25

## 2025-01-27 RX ADMIN — Medication 1 DROP(S): at 14:09

## 2025-01-27 RX ADMIN — ACETAMINOPHEN 1000 MILLIGRAM(S): 160 SUSPENSION ORAL at 22:11

## 2025-01-27 RX ADMIN — PANTOPRAZOLE 40 MILLIGRAM(S): 20 TABLET, DELAYED RELEASE ORAL at 06:24

## 2025-01-27 RX ADMIN — Medication 100 MILLIGRAM(S): at 21:14

## 2025-01-27 RX ADMIN — MORPHINE SULFATE 5 MILLIGRAM(S): 60 TABLET, FILM COATED, EXTENDED RELEASE ORAL at 16:50

## 2025-01-27 RX ADMIN — MORPHINE SULFATE 5 MILLIGRAM(S): 60 TABLET, FILM COATED, EXTENDED RELEASE ORAL at 10:39

## 2025-01-27 RX ADMIN — LIDOCAINE HYDROCHLORIDE 1 PATCH: 30 CREAM TOPICAL at 12:27

## 2025-01-27 RX ADMIN — LIDOCAINE HYDROCHLORIDE 1 PATCH: 30 CREAM TOPICAL at 19:54

## 2025-01-27 RX ADMIN — Medication 100 MILLIGRAM(S): at 14:09

## 2025-01-27 RX ADMIN — ACETAMINOPHEN 1000 MILLIGRAM(S): 160 SUSPENSION ORAL at 21:15

## 2025-01-27 RX ADMIN — Medication 1 DROP(S): at 06:24

## 2025-01-27 NOTE — CONSULT NOTE ADULT - SUBJECTIVE AND OBJECTIVE BOX
VASCULAR SURGERY CONSULT NOTE  --------------------------------------------------------------------------------------------    HPI:   Ms. Delarosa is a 74 year old woman with a PMH of rectal cancer with bony and lung metastases s/p RT [place] [facility] [place] DVT/PE on lovenox, who presented for right-sided pleural effusion s/p PTC by CTS with MIST protocol. Patient's therapeutic lovenox has been held 2/2 PTC since Jan 18. 2 days ago she noted RLE swelling similar to her previous LLE. She has no RLE pain or limited ROM, no skin changes or erythema. The PTC has since been removed this afternoon and pending final CXR post-pull the patient is eligible to resume anticoagulation.    ROS: 10-system review is otherwise negative except HPI above.      PAST MEDICAL & SURGICAL HISTORY:  Colorectal cancer      S/P colon resection        FAMILY HISTORY:  No pertinent family history in first degree relatives    [] Family history not pertinent as reviewed with the patient and family    --------------------------------------------------------------------------------------------    Vitals:   T(C): 37.1 (01-27-25 @ 16:03), Max: 37.1 (01-27-25 @ 16:03)  HR: 102 (01-27-25 @ 16:03) (99 - 113)  BP: 103/60 (01-27-25 @ 16:03) (103/60 - 123/74)  RR: 17 (01-27-25 @ 16:03) (16 - 18)  SpO2: 98% (01-27-25 @ 16:03) (98% - 100%)  CAPILLARY BLOOD GLUCOSE          01-26 @ 07:01  -  01-27 @ 07:00  --------------------------------------------------------  IN:  Total IN: 0 mL    OUT:    Chest Tube (mL): 25 mL    Colostomy (mL): 0 mL    Voided (mL): 500 mL  Total OUT: 525 mL    Total NET: -525 mL    Physical Examination:  GEN: NAD, resting quietly  NEURO: AAOx3, CN II-XII grossly intact, no focal deficits  PULM: symmetric chest rise bilaterally, no increased WOB  ABD: soft, nondistended, nontender, no rebound, no guarding  EXTR: mild RLE swelling to the midthigh, no erythema, no tenderness to palpation or passive flexion, no decreased ROM  --------------------------------------------------------------------------------------------    LABS  CBC (01-27 @ 05:58)                              8.6[L]                         7.39    )----------------(  150        88.4[H]% Neutrophils, 3.7[L]% Lymphocytes, ANC: 6.54                                27.4[L]    BMP (01-27 @ 05:58)             136     |  102     |  19    		Ca++ --      Ca 9.3                ---------------------------------( 88    		Mg 1.90               4.0     |  23      |  0.60  			Ph 3.4       LFTs (01-27 @ 05:58)      TPro 6.2 / Alb 2.7[L] / TBili 0.2 / DBili -- / AST 25 / ALT 19 / AlkPhos 379[H]      --------------------------------------------------------------------------------------------  IMAGING  < from: US Duplex Venous Lower Ext Complete, Bilateral (01.27.25 @ 13:30) >    FINDINGS:    RIGHT:  Occlusive thrombus in the right common femoral, femoral, popliteal,   posterior tibial, peroneal, soleal, and gastrocnemius veins.    LEFT:  Normal compressibility of the left common femoral vein. Noncompressible   thrombus in the mid femoral vein. Popliteal vein and calf veins are   compressible.    IMPRESSION:  Acute deep venous thrombosis: above and below the knee.    Extensive DVT throughout the RIGHT lower extremity.    DVT in the LEFT femoral vein.    Findings were sent via secure teams chat to ordering provider.    --- End of Report ---    < end of copied text >      --------------------------------------------------------------------------------------------    ASSESSMENT:    74 year old woman with a PMH of rectal cancer with bony and lung metastases s/p RT [place] [facility] [place] DVT/PE on lovenox, who presented for right-sided pleural effusion s/p PTC by CTS  for which AC was held and now has DVT of the R CFV through the femoral, popliteal, posterior tibial, peroneal, soleal, and gastrocnemius veins.    PLAN:   - recommend AC, patient apprehensive about PO AC, wants lovenox  - s/p removal of PTC, plan for post-pull CXR  - per CTS notes, patient eligible for resumption of AC  - if for some reason patient not resume AC, may need CTV to assess proximal extent    Discussed with Dr. Farideh Stovall, PGY4  C Team Surgery   [id number]

## 2025-01-27 NOTE — CONSULT NOTE ADULT - TIME BILLING
bilateral lower extremity acute deep venous thrombosis evaluation and management  coordination of care  chart review  note writing
Time spent for extensive review of the physical chart, electronic medical record, and documentation to obtain collateral information including but not limited to:  [x] Inpatient records (ED, H&P, primary team, and consultants if applicable, care coordination)  [x] Inpatient values/results (biomarkers, immunoassays, imaging, and microbiology results)  [x] Current or proposed treatment plans  [x] Pharmacotherapy review  [x] Discussion and coordinating care with primary team and interdisciplinary staff and floor staff  [x] Discussion including counseling/ education with the patient    In addition to above time, Spent 46 minutes separately discussing goals of care/ advanced care planning with patient

## 2025-01-27 NOTE — PROGRESS NOTE ADULT - SUBJECTIVE AND OBJECTIVE BOX
Date of Service  : 1/27/2025  Indication for Geriatrics and Palliative Care Services: symptom management     SUBJECTIVE AND OBJECTIVE:  Patient seen and examined at bedside. Patient reports having intermittent pain at right rib by chest tube site. She expresses concern of not being able to obtain her pain medications when she needs it and provided an example that she was unable to receive her po morphine solution 5mg when having 8/10 pain and states she was informed her pain needed to be a different level to receive her pain medication. Reviewed that currently, her pain regimen is po morphine 5mg prn for moderate pain and po morphine 10mg prn for severe pain and as such RN likely offered higher dose when she had 8/10 pain; patient reports not wanting to take higher dose of morphine and only wants to take po morphine 5mg instead or IV Tylenol. Also reviewed that per chart review, she only required morphine prn doses x2 and encouraged her to ask for prn doses when she is having pain at pain onset to prevent escalation to severe pain.     INTERVAL HPI/OVERNIGHT EVENTS:  In past 24 hours, received PO Morphine Solution 5mg x2     Allergies    sulfa drugs (Pruritus)    Intolerances    MEDICATIONS  (STANDING):  acetaminophen     Tablet .. 1000 milliGRAM(s) Oral every 8 hours  artificial tears (preservative free) Ophthalmic Solution 1 Drop(s) Both EYES three times a day  benzonatate 100 milliGRAM(s) Oral three times a day  famotidine    Tablet 20 milliGRAM(s) Oral at bedtime  gabapentin 200 milliGRAM(s) Oral <User Schedule>  gabapentin 300 milliGRAM(s) Oral <User Schedule>  lidocaine   4% Patch 1 Patch Transdermal daily  lidocaine   4% Patch 1 Patch Transdermal daily  pantoprazole    Tablet 40 milliGRAM(s) Oral before breakfast  polyethylene glycol 3350 17 Gram(s) Oral daily  senna 2 Tablet(s) Oral at bedtime  sodium chloride 0.9% Solution for Pleural Effusion 30 milliLiter(s) IntraPleural. every 12 hours    MEDICATIONS  (PRN):  aluminum hydroxide/magnesium hydroxide/simethicone Suspension 30 milliLiter(s) Oral every 4 hours PRN Dyspepsia  guaiFENesin  milliGRAM(s) Oral every 12 hours PRN Cough  melatonin 3 milliGRAM(s) Oral at bedtime PRN Insomnia  morphine   Solution 5 milliGRAM(s) Oral every 4 hours PRN Moderate-severe pain  ondansetron   Disintegrating Tablet 4 milliGRAM(s) Oral every 6 hours PRN Nausea and/or Vomiting  ondansetron Injectable 4 milliGRAM(s) IV Push every 8 hours PRN Nausea and/or Vomiting  sodium chloride 0.65% Nasal 1 Spray(s) Both Nostrils four times a day PRN Nasal Congestion      ITEMS UNCHECKED ARE NOT PRESENT    PRESENT SYMPTOMS: [ ]Unable to self-report due to altered mental status- see [ ] CPOT [ ] PAINADS [ ] RDOS  Source if other than patient:  [ ]Family   [ ]Team     Pain: [x ]yes [ ]no  QOL impact - wakes her up from sleep  Location -  R chest wall                  Aggravating factors - movement  Quality - sharp  Radiation - across R chest  Timing- intermittent   Severity (0-10 scale): can be as severe as 8/10  Minimal acceptable level (0-10 scale): 2/10      Dyspnea:                           [ ]Mild [ ]Moderate [ ]Severe  Anxiety:                             [x ]Mild [ ]Moderate [ ]Severe  Agitation:                          [ ]Mild [ ]Moderate [ ]Severe  Fatigue:                             [ ]Mild [ ]Moderate [ ]Severe  Nausea:                             [ ]Mild [ ]Moderate [ ]Severe  Loss of appetite:              [ ]Mild [ ]Moderate [ ]Severe  Constipation:                   [ ]Mild [ ]Moderate [ ]Severe  Diarrhea:                          [ ]Mild [ ]Moderate [ ]Severe      CPOT:    https://www.[email or web address].org/getattachment/[email or web address]/Critical-Care-Pain-Observation-Tool-(CPOT)    PCSSQ[Palliative Care Spiritual Screening Question]   Severity (0-10):  Score of 4 or > indicate consideration of Chaplaincy referral.  Chaplaincy Referral: [ ] yes [ ] refused [ ] following [x ] deferred    Caregiver [place]? : [ ] yes [ ] no [ ] Declined [x ] Deferred              Social work referral [ ] Patient & Family Centered Care Referral [ ]     Anticipatory Grief present?:  [ ] yes [ ] no  [ x] Deferred                  Social work referral [ ] Chaplaincy Referral[ ]    Other Symptoms:  [ x]All other review of systems negative   [ ] Unable to obtain due to poor mentation     PHYSICAL EXAM:  Vital Signs Last 24 Hrs  T(C): 37.1 (27 Jan 2025 16:03), Max: 37.1 (27 Jan 2025 16:03)  T(F): 98.8 (27 Jan 2025 16:03), Max: 98.8 (27 Jan 2025 16:03)  HR: 102 (27 Jan 2025 16:03) (99 - 113)  BP: 103/60 (27 Jan 2025 16:03) (103/60 - 123/74)  BP(mean): --  RR: 17 (27 Jan 2025 16:03) (16 - 18)  SpO2: 98% (27 Jan 2025 16:03) (98% - 100%)    Parameters below as of 27 Jan 2025 16:03  Patient On (Oxygen Delivery Method): room air         I&O's Summary    26 Jan 2025 07:01  -  27 Jan 2025 07:00  --------------------------------------------------------  IN: 0 mL / OUT: 525 mL / NET: -525 mL       GENERAL: [ ]Cachexia    [x ]Alert  [x ]Oriented x 3  [ ]Lethargic  [ ]Unarousable  [x ]Verbal  [ ]Non-Verbal  Behavioral:   [x ] Anxiety  [ ] Delirium [ ] Agitation [ ] Other  HEENT:  [x ]Normal   [ ]Dry mouth   [ ]ET Tube/Trach  [ ]Oral lesions  PULMONARY: normal respiratory effort, no accessory muscle use   [ ]Clear [ ]Tachypnea  [ ]Audible excessive secretions   [ ]Rhonchi        [ ]Right [ ]Left [ ]Bilateral  [ ]Crackles        [ ]Right [ ]Left [ ]Bilateral  [ ]Wheezing     [ ]Right [ ]Left [ ]Bilateral  [ ]Diminished breath sounds []right [ ]left [ ]bilateral  CARDIOVASCULAR:    [ x]Regular [ ]Irregular [ ]Tachy  [ ]Luther [ ]Murmur [ ]Other  GASTROINTESTINAL:Colostomy bag LLQ  [ x]Soft  [ ]Distended   [ ]+BS  [ x]Non tender [ ]Tender  [ ]Other [ ]PEG [ ]OGT/ NGT  Last BM:  1/27   GENITOURINARY:  [x ]Normal [ ] Incontinent   [ ]Oliguria/Anuria   [ ]Nj  MUSCULOSKELETAL:   [x ]Normal   [ ]Weakness  [ ]Bed/Wheelchair bound [ ]Edema  NEUROLOGIC:   [x ]No focal deficits  [ ]Cognitive impairment  [ ]Dysphagia [ ]Dysarthria [ ]Paresis [ ]Other   SKIN:   [x ]Normal  [ ]Rash  [ ]Other  [ ]Pressure ulcer(s)       Present on admission [ ]y [ ]n    CRITICAL CARE:  [ ]Shock Present  [ ]Septic [ ]Cardiogenic [ ]Neurologic [ ]Hypovolemic  [ ]Vasopressors [ ]Inotropes  [ ]Respiratory failure present [ ]Mechanical Ventilation [ ]Non-invasive ventilatory support [ ]High-Flow   [ ]Acute  [ ]Chronic [ ]Hypoxic  [ ]Hypercarbic [ ]Other  [ ]Other organ failure     LABS:  reviewed                         8.6    7.39  )-----------( 150      ( 27 Jan 2025 05:58 )             27.4   01-27    136  |  102  |  19  ----------------------------<  88  4.0   |  23  |  0.60    Ca    9.3      27 Jan 2025 05:58  Phos  3.4     01-27  Mg     1.90     01-27    TPro  6.2  /  Alb  2.7[L]  /  TBili  0.2  /  DBili  x   /  AST  25  /  ALT  19  /  AlkPhos  379[H]  01-27    Urinalysis Basic - ( 27 Jan 2025 05:58 )    Color: x / Appearance: x / SG: x / pH: x  Gluc: 88 mg/dL / Ketone: x  / Bili: x / Urobili: x   Blood: x / Protein: x / Nitrite: x   Leuk Esterase: x / RBC: x / WBC x   Sq Epi: x / Non Sq Epi: x / Bacteria: x      CAPILLARY BLOOD GLUCOSE              RADIOLOGY & ADDITIONAL STUDIES: reviewed     Protein Calorie Malnutrition Present: [ ]mild [ ]moderate [ ]severe [ ]underweight [ ]morbid obesity  https://www.andeal.org/vault/2440/web/files/ONC/Table_Clinical%20Characteristics%20to%20Document%20Malnutrition-White%20JV%20et%20al%202012.pdf    Height (cm): 165.1 (01-18-25 @ 05:00), 152.4 (10-29-24 @ 23:18)  Weight (kg): 52.2 (01-17-25 @ 15:55), 54.4 (10-29-24 @ 15:36)  BMI (kg/m2): 19.2 (01-18-25 @ 05:00), 22.5 (01-17-25 @ 15:55), 23.4 (10-29-24 @ 23:18)    [ ]PPSV2 < or = 30%  [ ]significant weight loss [ ]poor nutritional intake [ ]anasarca   [ ]Artificial Nutrition    REFERRALS:   [ ]Chaplaincy  [ ]Hospice  [ ]Child Life  [ ]Social Work  [ x]Case management [ ]Holistic Therapy      Date of Service  : 1/27/2025  Indication for Geriatrics and Palliative Care Services: symptom management     SUBJECTIVE AND OBJECTIVE:  Patient seen and examined at bedside. Patient reports having intermittent pain at right rib by chest tube site. She expresses concern of not being able to obtain her pain medications when she needs it and provided an example that she was unable to receive her po morphine solution 5mg when having 8/10 pain and states she was informed her pain needed to be a different level to receive her pain medication. Reviewed that currently, her pain regimen is po morphine 5mg prn for moderate pain and po morphine 10mg prn for severe pain and as such RN likely offered higher dose when she had 8/10 pain; patient reports not wanting to take higher dose of morphine and only wants to take po morphine 5mg instead or IV Tylenol. Also reviewed that per chart review, she only required morphine prn doses x2 and encouraged her to ask for prn doses when she is having pain at pain onset to prevent escalation to severe pain.     INTERVAL HPI/OVERNIGHT EVENTS:  In past 24 hours, received PO Morphine Solution 5mg x2 and IV Tylenol x1    Allergies    sulfa drugs (Pruritus)    Intolerances    MEDICATIONS  (STANDING):  acetaminophen     Tablet .. 1000 milliGRAM(s) Oral every 8 hours  artificial tears (preservative free) Ophthalmic Solution 1 Drop(s) Both EYES three times a day  benzonatate 100 milliGRAM(s) Oral three times a day  famotidine    Tablet 20 milliGRAM(s) Oral at bedtime  gabapentin 200 milliGRAM(s) Oral <User Schedule>  gabapentin 300 milliGRAM(s) Oral <User Schedule>  lidocaine   4% Patch 1 Patch Transdermal daily  lidocaine   4% Patch 1 Patch Transdermal daily  pantoprazole    Tablet 40 milliGRAM(s) Oral before breakfast  polyethylene glycol 3350 17 Gram(s) Oral daily  senna 2 Tablet(s) Oral at bedtime  sodium chloride 0.9% Solution for Pleural Effusion 30 milliLiter(s) IntraPleural. every 12 hours    MEDICATIONS  (PRN):  aluminum hydroxide/magnesium hydroxide/simethicone Suspension 30 milliLiter(s) Oral every 4 hours PRN Dyspepsia  guaiFENesin  milliGRAM(s) Oral every 12 hours PRN Cough  melatonin 3 milliGRAM(s) Oral at bedtime PRN Insomnia  morphine   Solution 5 milliGRAM(s) Oral every 4 hours PRN Moderate-severe pain  ondansetron   Disintegrating Tablet 4 milliGRAM(s) Oral every 6 hours PRN Nausea and/or Vomiting  ondansetron Injectable 4 milliGRAM(s) IV Push every 8 hours PRN Nausea and/or Vomiting  sodium chloride 0.65% Nasal 1 Spray(s) Both Nostrils four times a day PRN Nasal Congestion      ITEMS UNCHECKED ARE NOT PRESENT    PRESENT SYMPTOMS: [ ]Unable to self-report due to altered mental status- see [ ] CPOT [ ] PAINADS [ ] RDOS  Source if other than patient:  [ ]Family   [ ]Team     Pain: [x ]yes [ ]no  QOL impact - wakes her up from sleep  Location -  R chest wall                  Aggravating factors - movement  Quality - sharp  Radiation - across R chest  Timing- intermittent   Severity (0-10 scale): can be as severe as 8/10  Minimal acceptable level (0-10 scale): 2/10      Dyspnea:                           [ ]Mild [ ]Moderate [ ]Severe  Anxiety:                             [x ]Mild [ ]Moderate [ ]Severe  Agitation:                          [ ]Mild [ ]Moderate [ ]Severe  Fatigue:                             [ ]Mild [ ]Moderate [ ]Severe  Nausea:                             [ ]Mild [ ]Moderate [ ]Severe  Loss of appetite:              [ ]Mild [ ]Moderate [ ]Severe  Constipation:                   [ ]Mild [ ]Moderate [ ]Severe  Diarrhea:                          [ ]Mild [ ]Moderate [ ]Severe      CPOT:    https://www.[facility].org/getattachment/[email or web address]/Critical-Care-Pain-Observation-Tool-(CPOT)    PCSSQ[Palliative Care Spiritual Screening Question]   Severity (0-10):  Score of 4 or > indicate consideration of Chaplaincy referral.  Chaplaincy Referral: [ ] yes [ ] refused [ ] following [x ] deferred    Caregiver [place]? : [ ] yes [ ] no [ ] Declined [x ] Deferred              Social work referral [ ] Patient & Family Centered Care Referral [ ]     Anticipatory Grief present?:  [ ] yes [ ] no  [ x] Deferred                  Social work referral [ ] Chaplaincy Referral[ ]    Other Symptoms:  [ x]All other review of systems negative   [ ] Unable to obtain due to poor mentation     PHYSICAL EXAM:  Vital Signs Last 24 Hrs  T(C): 37.1 (27 Jan 2025 16:03), Max: 37.1 (27 Jan 2025 16:03)  T(F): 98.8 (27 Jan 2025 16:03), Max: 98.8 (27 Jan 2025 16:03)  HR: 102 (27 Jan 2025 16:03) (99 - 113)  BP: 103/60 (27 Jan 2025 16:03) (103/60 - 123/74)  BP(mean): --  RR: 17 (27 Jan 2025 16:03) (16 - 18)  SpO2: 98% (27 Jan 2025 16:03) (98% - 100%)    Parameters below as of 27 Jan 2025 16:03  Patient On (Oxygen Delivery Method): room air         I&O's Summary    26 Jan 2025 07:01  -  27 Jan 2025 07:00  --------------------------------------------------------  IN: 0 mL / OUT: 525 mL / NET: -525 mL       GENERAL: [ ]Cachexia    [x ]Alert  [x ]Oriented x 3  [ ]Lethargic  [ ]Unarousable  [x ]Verbal  [ ]Non-Verbal  Behavioral:   [x ] Anxiety  [ ] Delirium [ ] Agitation [ ] Other  HEENT:  [x ]Normal   [ ]Dry mouth   [ ]ET Tube/Trach  [ ]Oral lesions  PULMONARY: normal respiratory effort, no accessory muscle use   [ ]Clear [ ]Tachypnea  [ ]Audible excessive secretions   [ ]Rhonchi        [ ]Right [ ]Left [ ]Bilateral  [ ]Crackles        [ ]Right [ ]Left [ ]Bilateral  [ ]Wheezing     [ ]Right [ ]Left [ ]Bilateral  [ ]Diminished breath sounds []right [ ]left [ ]bilateral  CARDIOVASCULAR:    [ x]Regular [ ]Irregular [ ]Tachy  [ ]Luther [ ]Murmur [ ]Other  GASTROINTESTINAL:Colostomy bag LLQ  [ x]Soft  [ ]Distended   [ ]+BS  [ x]Non tender [ ]Tender  [ ]Other [ ]PEG [ ]OGT/ NGT  Last BM:  1/27   GENITOURINARY:  [x ]Normal [ ] Incontinent   [ ]Oliguria/Anuria   [ ]Nj  MUSCULOSKELETAL:   [x ]Normal   [ ]Weakness  [ ]Bed/Wheelchair bound [ ]Edema  NEUROLOGIC:   [x ]No focal deficits  [ ]Cognitive impairment  [ ]Dysphagia [ ]Dysarthria [ ]Paresis [ ]Other   SKIN:   [x ]Normal  [ ]Rash  [ ]Other  [ ]Pressure ulcer(s)       Present on admission [ ]y [ ]n    CRITICAL CARE:  [ ]Shock Present  [ ]Septic [ ]Cardiogenic [ ]Neurologic [ ]Hypovolemic  [ ]Vasopressors [ ]Inotropes  [ ]Respiratory failure present [ ]Mechanical Ventilation [ ]Non-invasive ventilatory support [ ]High-Flow   [ ]Acute  [ ]Chronic [ ]Hypoxic  [ ]Hypercarbic [ ]Other  [ ]Other organ failure     LABS:  reviewed                         8.6    7.39  )-----------( 150      ( 27 Jan 2025 05:58 )             27.4   01-27    136  |  102  |  19  ----------------------------<  88  4.0   |  23  |  0.60    Ca    9.3      27 Jan 2025 05:58  Phos  3.4     01-27  Mg     1.90     01-27    TPro  6.2  /  Alb  2.7[L]  /  TBili  0.2  /  DBili  x   /  AST  25  /  ALT  19  /  AlkPhos  379[H]  01-27    Urinalysis Basic - ( 27 Jan 2025 05:58 )    Color: x / Appearance: x / SG: x / pH: x  Gluc: 88 mg/dL / Ketone: x  / Bili: x / Urobili: x   Blood: x / Protein: x / Nitrite: x   Leuk Esterase: x / RBC: x / WBC x   Sq Epi: x / Non Sq Epi: x / Bacteria: x      CAPILLARY BLOOD GLUCOSE              RADIOLOGY & ADDITIONAL STUDIES: reviewed     Protein Calorie Malnutrition Present: [ ]mild [ ]moderate [ ]severe [ ]underweight [ ]morbid obesity  https://www.andeal.org/vault/2440/web/files/ONC/Table_Clinical%20Characteristics%20to%20Document%20Malnutrition-White%20JV%20et%20al%202012.pdf    Height (cm): 165.1 (01-18-25 @ 05:00), 152.4 (10-29-24 @ 23:18)  Weight (kg): 52.2 (01-17-25 @ 15:55), 54.4 (10-29-24 @ 15:36)  BMI (kg/m2): 19.2 (01-18-25 @ 05:00), 22.5 (01-17-25 @ 15:55), 23.4 (10-29-24 @ 23:18)    [ ]PPSV2 < or = 30%  [ ]significant weight loss [ ]poor nutritional intake [ ]anasarca   [ ]Artificial Nutrition    REFERRALS:   [ ]Chaplaincy  [ ]Hospice  [ ]Child Life  [ ]Social Work  [ x]Case management [ ]Holistic Therapy

## 2025-01-27 NOTE — PROGRESS NOTE ADULT - ASSESSMENT
73yo female with right foot edema, DVT, no current pain  ·	Patient seen and evaluated  ·	Patient without musculoskeletal pain to the right foot or ankle  ·	Edema primarily due to DVT RLE  ·	No xray required patient denies any trauma  ·	Please reconsult as needed

## 2025-01-27 NOTE — PROGRESS NOTE ADULT - TIME BILLING
Time spent for extensive review of the physical chart, electronic medical record, and documentation to obtain collateral information including but not limited to:  [x] Inpatient records (ED, H&P, primary team, and consultants if applicable, care coordination)  [x] Inpatient values/results (biomarkers, immunoassays, imaging, and microbiology results)  [x] Current or proposed treatment plans  [x] Pharmacotherapy review  [x] Discussion and coordinating care with primary team and interdisciplinary staff and floor staff  [x] Discussion including counseling/ education with the patient
Time spent for extensive review of the physical chart, electronic medical record, and documentation to obtain collateral information including but not limited to:  [x] Inpatient records (ED, H&P, primary team, and consultants if applicable, care coordination)  [x] Inpatient values/results (biomarkers, immunoassays, imaging, and microbiology results)  [x] Current or proposed treatment plans  [x] Pharmacotherapy review  [x] Discussion and coordinating care with primary team and interdisciplinary staff and floor staff  [x] Discussion including counseling/ education with the patient    In addition to above time, Spent 16 minutes separately discussing goals of care/ advanced care planning with patient

## 2025-01-27 NOTE — PROGRESS NOTE ADULT - SUBJECTIVE AND OBJECTIVE BOX
INTERVAL HPI/OVERNIGHT EVENTS:  Patient S&E at bedside. No o/n events, patient tearful this morning and reporting significant pain. Says she is not getting her pain medications. LE doppler shows acute b/l LE DVTs.     VITAL SIGNS:  T(F): 97.9 (01-27-25 @ 10:18)  HR: 105 (01-27-25 @ 10:18)  BP: 111/64 (01-27-25 @ 10:18)  RR: 16 (01-27-25 @ 10:18)  SpO2: 99% (01-27-25 @ 10:18)  Wt(kg): --    PHYSICAL EXAM:    Constitutional: tearful and anxious   Eyes: EOMI, sclera non-icteric  Neck: supple, no masses  Respiratory: symmetric chest expansion   Cardiovascular: RRR, no M/R/G  Gastrointestinal: soft, NTND, no masses palpable  Extremities: no c/c/e  Neurological: AAOx3      MEDICATIONS  (STANDING):  acetaminophen     Tablet .. 1000 milliGRAM(s) Oral every 8 hours  alteplase  Injectable for Pleural Effusion 10 milliGRAM(s) IntraPleural. every 12 hours  artificial tears (preservative free) Ophthalmic Solution 1 Drop(s) Both EYES three times a day  benzonatate 100 milliGRAM(s) Oral three times a day  famotidine    Tablet 20 milliGRAM(s) Oral at bedtime  gabapentin 300 milliGRAM(s) Oral <User Schedule>  gabapentin 200 milliGRAM(s) Oral <User Schedule>  heparin   Injectable 5000 Unit(s) SubCutaneous every 12 hours  lidocaine   4% Patch 1 Patch Transdermal daily  lidocaine   4% Patch 1 Patch Transdermal daily  pantoprazole    Tablet 40 milliGRAM(s) Oral before breakfast  polyethylene glycol 3350 17 Gram(s) Oral daily  senna 2 Tablet(s) Oral at bedtime  sodium chloride 0.9% Solution for Pleural Effusion 30 milliLiter(s) IntraPleural. every 12 hours    MEDICATIONS  (PRN):  aluminum hydroxide/magnesium hydroxide/simethicone Suspension 30 milliLiter(s) Oral every 4 hours PRN Dyspepsia  guaiFENesin  milliGRAM(s) Oral every 12 hours PRN Cough  melatonin 3 milliGRAM(s) Oral at bedtime PRN Insomnia  morphine   Solution 5 milliGRAM(s) Oral every 4 hours PRN Moderate-severe pain  ondansetron   Disintegrating Tablet 4 milliGRAM(s) Oral every 6 hours PRN Nausea and/or Vomiting  ondansetron Injectable 4 milliGRAM(s) IV Push every 8 hours PRN Nausea and/or Vomiting  sodium chloride 0.65% Nasal 1 Spray(s) Both Nostrils four times a day PRN Nasal Congestion      Allergies    sulfa drugs (Pruritus)    Intolerances        LABS:                        8.6    7.39  )-----------( 150      ( 27 Jan 2025 05:58 )             27.4     01-27    136  |  102  |  19  ----------------------------<  88  4.0   |  23  |  0.60    Ca    9.3      27 Jan 2025 05:58  Phos  3.4     01-27  Mg     1.90     01-27    TPro  6.2  /  Alb  2.7[L]  /  TBili  0.2  /  DBili  x   /  AST  25  /  ALT  19  /  AlkPhos  379[H]  01-27      Urinalysis Basic - ( 27 Jan 2025 05:58 )    Color: x / Appearance: x / SG: x / pH: x  Gluc: 88 mg/dL / Ketone: x  / Bili: x / Urobili: x   Blood: x / Protein: x / Nitrite: x   Leuk Esterase: x / RBC: x / WBC x   Sq Epi: x / Non Sq Epi: x / Bacteria: x        RADIOLOGY & ADDITIONAL TESTS:  Studies reviewed. Vaginal Delivery

## 2025-01-27 NOTE — CONSULT NOTE ADULT - ATTENDING COMMENTS
74 year old woman with bilateral femoral vein acute thrombosis  patient hypercoagulable secondary to metastatic rectal cancer  high risk for bleeding on therapeutic anticoagulation  will plan for IVC filter placement  remainder of care as per primary team

## 2025-01-27 NOTE — PROGRESS NOTE ADULT - ASSESSMENT
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   Palliative Care consulted for complex decision making  related to goals of care discussions in the setting of advanced illness/ evaluation and management of pain/ symptoms

## 2025-01-27 NOTE — PROGRESS NOTE ADULT - SUBJECTIVE AND OBJECTIVE BOX
Pt seen. Resting in bed. Continues to   c/o Rt rib, shoulder/back pain. No CP or SOB  Does not feel breathing any different since drainage.   CT scan results d/w pt.     Vital Signs Last 24 Hrs  T(C): 36.6 (27 Jan 2025 10:18), Max: 36.9 (26 Jan 2025 21:15)  T(F): 97.9 (27 Jan 2025 10:18), Max: 98.4 (26 Jan 2025 21:15)  HR: 105 (27 Jan 2025 10:18) (95 - 113)  BP: 111/64 (27 Jan 2025 10:18) (110/88 - 123/74)  BP(mean): --  RR: 16 (27 Jan 2025 10:18) (16 - 18)  SpO2: 99% (27 Jan 2025 10:18) (98% - 100%)    Parameters below as of 27 Jan 2025 10:18  Patient On (Oxygen Delivery Method): room air    A+O x 3  Dec Rt Pleural effusion on CT scan   Rt PTC on  Pt seen. Resting in bed. Continues to   c/o Rt rib, shoulder/back pain. No CP or SOB  Does not feel breathing any different since drainage.   CT scan results d/w pt.     Vital Signs Last 24 Hrs  T(C): 36.6 (27 Jan 2025 10:18), Max: 36.9 (26 Jan 2025 21:15)  T(F): 97.9 (27 Jan 2025 10:18), Max: 98.4 (26 Jan 2025 21:15)  HR: 105 (27 Jan 2025 10:18) (95 - 113)  BP: 111/64 (27 Jan 2025 10:18) (110/88 - 123/74)  BP(mean): --  RR: 16 (27 Jan 2025 10:18) (16 - 18)  SpO2: 99% (27 Jan 2025 10:18) (98% - 100%)    Parameters below as of 27 Jan 2025 10:18  Patient On (Oxygen Delivery Method): room air    A+O x 3  Dec BS bilat.   Rt PTC minimal output x 24hrs. On suction.   No air leak.  CT scan of chest showing improvement in Rt effusion,   Inc aeration of lung.   PTC removed at bedside. Occlusive dressing placed.     A/P: 74y F w PMHx Rectal cancer with local pelvic recurrence, metastases to lungs /bone and recurrent loculated R effusion.   1/20: frustrated by hospital course, agree w/ IR drainage of loculated effusion. S/p placement of Rt PTC by IR  1/25-Now completed full MIST protocol x 6 doses. CXR improved.  1/27- CT scan done yesterday. Reviewed by DR. Sultana, improved effusion.   PTC removed per DR. Sultana.     Plan:  -PTC removed. F/u rpt CXR  -If rpt CXR stable, pt can be restarted on a/c  -Pt is not a candidate for any further thoracic surgical intervention  -Pt can f/u with DR Sultana as outpt in office 2-3 weeks after discharge to   eval for any reaccumulation of fluid.   Recall with any questions or concerns.

## 2025-01-27 NOTE — PROGRESS NOTE ADULT - ASSESSMENT
75 y/o F with history of recurrent rectal cancer admitted for dyspnea with right pleural effusion.     1. Recurrent metastatic rectal cancer  ·	Patient follows with [facility]   ·	Recent progression on FOLFOX  ·	Planning to switch to lonsurf and bevacizumab  ·	s/p palliative radiation therapy to the right rib area as well as spine.    ·	No disease directed therapy as inpatient  ·	Patient to follow with [facility] upon discharge    2.  Dyspnea  ·	Patient has loculated right pleural effusion  ·	Appx 360cc clear yellow fluid aspirated. Drain placed to Pleur Evac.   ·	MIST dornase and alteplase treatments, completed protocol x6 treatments.   ·	Repeat CT with trace effusion. Persistent complete RML atelectasis. Still requiring 2L NC.   ·	Await pleural studies and cytology  ·	Appreciate CT surgery recs  ·	In setting of new DVT would consider obtaining CTA to assess for PE    3. Acute b/l LE DVT  ·	Advise full dose anticoagulation, heparin gtt or lovenox depending on what procedures are planned         Will cont to follow,    Malathi Pérez MD  Hematology/Oncology  [facility]  [phone] (Office)  [phone] (Alt office)  Evenings and weekends please call MD on call or office   73 y/o F with history of recurrent rectal cancer admitted for dyspnea with right pleural effusion.     1. Recurrent metastatic rectal cancer  ·	Patient follows with [facility]   ·	Recent progression on FOLFOX  ·	Planning to switch to lonsurf and bevacizumab  ·	s/p palliative radiation therapy to the right rib area as well as spine.    ·	No disease directed therapy as inpatient  ·	Patient to follow with [facility] upon discharge  ·	Significant pain, would get palliative care to optimize pain management     2.  Dyspnea  ·	Patient has loculated right pleural effusion  ·	Appx 360cc clear yellow fluid aspirated. Drain placed to Pleur Evac.   ·	MIST dornase and alteplase treatments, completed protocol x6 treatments.   ·	Repeat CT with trace effusion. Persistent complete RML atelectasis. Still requiring 2L NC.   ·	Await pleural studies and cytology  ·	Appreciate CT surgery recs  ·	In setting of new DVT would consider obtaining CTA to assess for PE    3. Acute b/l LE DVT  ·	Advise full dose anticoagulation, heparin gtt or lovenox depending on what procedures are planned         Will cont to follow,    Malathi Pérez MD  Hematology/Oncology  [facility]  [phone] (Office)  [phone] (Alt office)  Evenings and weekends please call MD on call or office

## 2025-01-27 NOTE — PROGRESS NOTE ADULT - SUBJECTIVE AND OBJECTIVE BOX
Patient is a 74y old  Female who presents with a chief complaint of pleural effusion (27 Jan 2025 16:45)       INTERVAL HPI/OVERNIGHT EVENTS:  Patient seen and evaluated at bedside.  Pt is resting comfortable in NAD. Denies N/V/F/C.  Pain rated at X/10    Allergies    sulfa drugs (Pruritus)    Intolerances        Vital Signs Last 24 Hrs  T(C): 37.1 (27 Jan 2025 16:03), Max: 37.1 (27 Jan 2025 16:03)  T(F): 98.8 (27 Jan 2025 16:03), Max: 98.8 (27 Jan 2025 16:03)  HR: 102 (27 Jan 2025 16:03) (99 - 113)  BP: 103/60 (27 Jan 2025 16:03) (103/60 - 123/74)  BP(mean): --  RR: 17 (27 Jan 2025 16:03) (16 - 18)  SpO2: 98% (27 Jan 2025 16:03) (98% - 100%)    Parameters below as of 27 Jan 2025 16:03  Patient On (Oxygen Delivery Method): room air        LABS:                        8.6    7.39  )-----------( 150      ( 27 Jan 2025 05:58 )             27.4     01-27    136  |  102  |  19  ----------------------------<  88  4.0   |  23  |  0.60    Ca    9.3      27 Jan 2025 05:58  Phos  3.4     01-27  Mg     1.90     01-27    TPro  6.2  /  Alb  2.7[L]  /  TBili  0.2  /  DBili  x   /  AST  25  /  ALT  19  /  AlkPhos  379[H]  01-27      Urinalysis Basic - ( 27 Jan 2025 05:58 )    Color: x / Appearance: x / SG: x / pH: x  Gluc: 88 mg/dL / Ketone: x  / Bili: x / Urobili: x   Blood: x / Protein: x / Nitrite: x   Leuk Esterase: x / RBC: x / WBC x   Sq Epi: x / Non Sq Epi: x / Bacteria: x      CAPILLARY BLOOD GLUCOSE          Lower Extremity Physical Exam:  Vascular: DP/PT 1/4, B/L, CFT <3 seconds B/L, Temperature gradient within normal, B/L.   Neuro: Epicritic sensation intact to the level of digits, B/L.  Musculoskeletal/Ortho: no gross digital deformities or contractures noted, no replicable right foot pain  Skin: no open lesions, no interdigital maceration, right foot edema generalized, no signs of infection      RADIOLOGY & ADDITIONAL TESTS:

## 2025-01-27 NOTE — PROGRESS NOTE ADULT - SUBJECTIVE AND OBJECTIVE BOX
Name of Patient : TAHIR PENA  MRN: [id number]  Date of visit: 01-27-25     Subjective: Patient seen and examined. No new events except as noted.   armando hammond  chest ube care per TS team     REVIEW OF SYSTEMS:    CONSTITUTIONAL: No weakness, fevers or chills  EYES/ENT: No visual changes;  No vertigo or throat pain   NECK: No pain or stiffness  RESPIRATORY: No cough, wheezing, hemoptysis; No shortness of breath  CARDIOVASCULAR: No chest pain or palpitations  GASTROINTESTINAL: No abdominal or epigastric pain. No nausea, vomiting, or hematemesis; No diarrhea or constipation. No melena or hematochezia.  GENITOURINARY: No dysuria, frequency or hematuria  NEUROLOGICAL: No numbness or weakness  SKIN: No itching, burning, rashes, or lesions   All other review of systems is negative unless indicated above.    MEDICATIONS:  MEDICATIONS  (STANDING):  acetaminophen     Tablet .. 1000 milliGRAM(s) Oral every 8 hours  artificial tears (preservative free) Ophthalmic Solution 1 Drop(s) Both EYES three times a day  benzonatate 100 milliGRAM(s) Oral three times a day  famotidine    Tablet 20 milliGRAM(s) Oral at bedtime  gabapentin 200 milliGRAM(s) Oral <User Schedule>  gabapentin 300 milliGRAM(s) Oral <User Schedule>  lidocaine   4% Patch 1 Patch Transdermal daily  lidocaine   4% Patch 1 Patch Transdermal daily  pantoprazole    Tablet 40 milliGRAM(s) Oral before breakfast  polyethylene glycol 3350 17 Gram(s) Oral daily  senna 2 Tablet(s) Oral at bedtime  sodium chloride 0.9% Solution for Pleural Effusion 30 milliLiter(s) IntraPleural. every 12 hours      PHYSICAL EXAM:  T(C): 37.1 (01-27-25 @ 16:03), Max: 37.1 (01-27-25 @ 16:03)  HR: 102 (01-27-25 @ 16:03) (95 - 113)  BP: 103/60 (01-27-25 @ 16:03) (103/60 - 123/74)  RR: 17 (01-27-25 @ 16:03) (16 - 18)  SpO2: 98% (01-27-25 @ 16:03) (98% - 100%)  Wt(kg): --  I&O's Summary    26 Jan 2025 07:01  -  27 Jan 2025 07:00  --------------------------------------------------------  IN: 0 mL / OUT: 525 mL / NET: -525 mL          Appearance: Normal	  HEENT:  PERRLA   Lymphatic: No lymphadenopathy   Cardiovascular: Normal S1 S2, no JVD  Respiratory: normal effort , clear  Gastrointestinal:  Soft, Non-tender  Skin: No rashes,  warm to touch  Psychiatry:  Mood & affect appropriate  Musculuskeletal: No edema    recent labs, Imaging and EKGs personally reviewed     01-26-25 @ 07:01  -  01-27-25 @ 07:00  --------------------------------------------------------  IN: 0 mL / OUT: 525 mL / NET: -525 mL                          8.6    7.39  )-----------( 150      ( 27 Jan 2025 05:58 )             27.4               01-27    136  |  102  |  19  ----------------------------<  88  4.0   |  23  |  0.60    Ca    9.3      27 Jan 2025 05:58  Phos  3.4     01-27  Mg     1.90     01-27    TPro  6.2  /  Alb  2.7[L]  /  TBili  0.2  /  DBili  x   /  AST  25  /  ALT  19  /  AlkPhos  379[H]  01-27                       Urinalysis Basic - ( 27 Jan 2025 05:58 )    Color: x / Appearance: x / SG: x / pH: x  Gluc: 88 mg/dL / Ketone: x  / Bili: x / Urobili: x   Blood: x / Protein: x / Nitrite: x   Leuk Esterase: x / RBC: x / WBC x   Sq Epi: x / Non Sq Epi: x / Bacteria: x

## 2025-01-27 NOTE — CONSULT NOTE ADULT - CONSULT REASON
R pleural effusion
History of recurrent rectal cancer, right pleural effusion
Thoracentesis
DVT
GOC/symptom management

## 2025-01-28 ENCOUNTER — TRANSCRIPTION ENCOUNTER (OUTPATIENT)
Age: 75
End: 2025-01-28

## 2025-01-28 DIAGNOSIS — I82.409 ACUTE EMBOLISM AND THROMBOSIS OF UNSPECIFIED DEEP VEINS OF UNSPECIFIED LOWER EXTREMITY: ICD-10-CM

## 2025-01-28 LAB
ADD ON TEST-SPECIMEN IN LAB: SIGNIFICANT CHANGE UP
ALBUMIN SERPL ELPH-MCNC: 2.7 G/DL — LOW (ref 3.3–5)
ALP SERPL-CCNC: 379 U/L — HIGH (ref 40–120)
ALT FLD-CCNC: 18 U/L — SIGNIFICANT CHANGE UP (ref 4–33)
ANION GAP SERPL CALC-SCNC: 12 MMOL/L — SIGNIFICANT CHANGE UP (ref 7–14)
APPEARANCE UR: ABNORMAL
AST SERPL-CCNC: 22 U/L — SIGNIFICANT CHANGE UP (ref 4–32)
BACTERIA # UR AUTO: ABNORMAL /HPF
BILIRUB DIRECT SERPL-MCNC: <0.2 MG/DL — SIGNIFICANT CHANGE UP (ref 0–0.3)
BILIRUB INDIRECT FLD-MCNC: SIGNIFICANT CHANGE UP MG/DL (ref 0–1)
BILIRUB SERPL-MCNC: <0.2 MG/DL — SIGNIFICANT CHANGE UP (ref 0.2–1.2)
BILIRUB UR-MCNC: NEGATIVE — SIGNIFICANT CHANGE UP
BUN SERPL-MCNC: 20 MG/DL — SIGNIFICANT CHANGE UP (ref 7–23)
CALCIUM SERPL-MCNC: 9.2 MG/DL — SIGNIFICANT CHANGE UP (ref 8.4–10.5)
CAST: 10 /LPF — HIGH (ref 0–4)
CHLORIDE SERPL-SCNC: 103 MMOL/L — SIGNIFICANT CHANGE UP (ref 98–107)
CO2 SERPL-SCNC: 22 MMOL/L — SIGNIFICANT CHANGE UP (ref 22–31)
COD CRY URNS QL: PRESENT
COLOR SPEC: SIGNIFICANT CHANGE UP
CREAT SERPL-MCNC: 0.66 MG/DL — SIGNIFICANT CHANGE UP (ref 0.5–1.3)
DIFF PNL FLD: ABNORMAL
EGFR: 93 ML/MIN/1.73M2 — SIGNIFICANT CHANGE UP
GLUCOSE SERPL-MCNC: 87 MG/DL — SIGNIFICANT CHANGE UP (ref 70–99)
GLUCOSE UR QL: NEGATIVE MG/DL — SIGNIFICANT CHANGE UP
HCT VFR BLD CALC: 27.8 % — LOW (ref 34.5–45)
HGB BLD-MCNC: 8.3 G/DL — LOW (ref 11.5–15.5)
KETONES UR-MCNC: NEGATIVE MG/DL — SIGNIFICANT CHANGE UP
LEUKOCYTE ESTERASE UR-ACNC: ABNORMAL
MAGNESIUM SERPL-MCNC: 1.9 MG/DL — SIGNIFICANT CHANGE UP (ref 1.6–2.6)
MCHC RBC-ENTMCNC: 28.6 PG — SIGNIFICANT CHANGE UP (ref 27–34)
MCHC RBC-ENTMCNC: 29.9 G/DL — LOW (ref 32–36)
MCV RBC AUTO: 95.9 FL — SIGNIFICANT CHANGE UP (ref 80–100)
NITRITE UR-MCNC: POSITIVE
NRBC # BLD AUTO: 0 K/UL — SIGNIFICANT CHANGE UP (ref 0–0)
NRBC # BLD: 0 /100 WBCS — SIGNIFICANT CHANGE UP (ref 0–0)
NRBC # FLD: 0 K/UL — SIGNIFICANT CHANGE UP (ref 0–0)
NRBC BLD-RTO: 0 /100 WBCS — SIGNIFICANT CHANGE UP (ref 0–0)
PH UR: 5.5 — SIGNIFICANT CHANGE UP (ref 5–8)
PHOSPHATE SERPL-MCNC: 3.5 MG/DL — SIGNIFICANT CHANGE UP (ref 2.5–4.5)
PLATELET # BLD AUTO: 154 K/UL — SIGNIFICANT CHANGE UP (ref 150–400)
POTASSIUM SERPL-MCNC: 4.3 MMOL/L — SIGNIFICANT CHANGE UP (ref 3.5–5.3)
POTASSIUM SERPL-SCNC: 4.3 MMOL/L — SIGNIFICANT CHANGE UP (ref 3.5–5.3)
PROT SERPL-MCNC: 5.7 G/DL — LOW (ref 6–8.3)
PROT UR-MCNC: 300 MG/DL
RBC # BLD: 2.9 M/UL — LOW (ref 3.8–5.2)
RBC # FLD: 17.5 % — HIGH (ref 10.3–14.5)
RBC CASTS # UR COMP ASSIST: 30 /HPF — HIGH (ref 0–4)
REVIEW: SIGNIFICANT CHANGE UP
SODIUM SERPL-SCNC: 137 MMOL/L — SIGNIFICANT CHANGE UP (ref 135–145)
SP GR SPEC: 1.05 — HIGH (ref 1–1.03)
SQUAMOUS # UR AUTO: 1 /HPF — SIGNIFICANT CHANGE UP (ref 0–5)
UROBILINOGEN FLD QL: 1 MG/DL — SIGNIFICANT CHANGE UP (ref 0.2–1)
WBC # BLD: 5.54 K/UL — SIGNIFICANT CHANGE UP (ref 3.8–10.5)
WBC # FLD AUTO: 5.54 K/UL — SIGNIFICANT CHANGE UP (ref 3.8–10.5)
WBC UR QL: 37 /HPF — HIGH (ref 0–5)

## 2025-01-28 PROCEDURE — 71045 X-RAY EXAM CHEST 1 VIEW: CPT | Mod: 26

## 2025-01-28 RX ORDER — CEFTRIAXONE 250 MG/1
1000 INJECTION, POWDER, FOR SOLUTION INTRAMUSCULAR; INTRAVENOUS EVERY 24 HOURS
Refills: 0 | Status: COMPLETED | OUTPATIENT
Start: 2025-01-28 | End: 2025-01-30

## 2025-01-28 RX ORDER — ACETAMINOPHEN 160 MG/5ML
1000 SUSPENSION ORAL ONCE
Refills: 0 | Status: COMPLETED | OUTPATIENT
Start: 2025-01-28 | End: 2025-01-28

## 2025-01-28 RX ORDER — ENOXAPARIN SODIUM 100 MG/ML
60 INJECTION SUBCUTANEOUS EVERY 24 HOURS
Refills: 0 | Status: DISCONTINUED | OUTPATIENT
Start: 2025-01-28 | End: 2025-01-28

## 2025-01-28 RX ORDER — ENOXAPARIN SODIUM 100 MG/ML
50 INJECTION SUBCUTANEOUS EVERY 12 HOURS
Refills: 0 | Status: DISCONTINUED | OUTPATIENT
Start: 2025-01-28 | End: 2025-01-31

## 2025-01-28 RX ADMIN — LIDOCAINE HYDROCHLORIDE 1 PATCH: 30 CREAM TOPICAL at 11:58

## 2025-01-28 RX ADMIN — ACETAMINOPHEN 1000 MILLIGRAM(S): 160 SUSPENSION ORAL at 14:34

## 2025-01-28 RX ADMIN — Medication 100 MILLIGRAM(S): at 14:34

## 2025-01-28 RX ADMIN — ACETAMINOPHEN 1000 MILLIGRAM(S): 160 SUSPENSION ORAL at 23:32

## 2025-01-28 RX ADMIN — LIDOCAINE HYDROCHLORIDE 1 PATCH: 30 CREAM TOPICAL at 23:00

## 2025-01-28 RX ADMIN — MORPHINE SULFATE 5 MILLIGRAM(S): 60 TABLET, FILM COATED, EXTENDED RELEASE ORAL at 18:19

## 2025-01-28 RX ADMIN — MORPHINE SULFATE 5 MILLIGRAM(S): 60 TABLET, FILM COATED, EXTENDED RELEASE ORAL at 17:19

## 2025-01-28 RX ADMIN — Medication 1 DROP(S): at 07:09

## 2025-01-28 RX ADMIN — LIDOCAINE HYDROCHLORIDE 1 PATCH: 30 CREAM TOPICAL at 01:11

## 2025-01-28 RX ADMIN — ACETAMINOPHEN 1000 MILLIGRAM(S): 160 SUSPENSION ORAL at 07:09

## 2025-01-28 RX ADMIN — MORPHINE SULFATE 5 MILLIGRAM(S): 60 TABLET, FILM COATED, EXTENDED RELEASE ORAL at 12:49

## 2025-01-28 RX ADMIN — Medication 1 DROP(S): at 21:34

## 2025-01-28 RX ADMIN — Medication 100 MILLIGRAM(S): at 21:34

## 2025-01-28 RX ADMIN — PANTOPRAZOLE 40 MILLIGRAM(S): 20 TABLET, DELAYED RELEASE ORAL at 07:13

## 2025-01-28 RX ADMIN — GABAPENTIN 200 MILLIGRAM(S): 800 TABLET ORAL at 15:03

## 2025-01-28 RX ADMIN — MORPHINE SULFATE 5 MILLIGRAM(S): 60 TABLET, FILM COATED, EXTENDED RELEASE ORAL at 13:49

## 2025-01-28 RX ADMIN — FAMOTIDINE 20 MILLIGRAM(S): 10 INJECTION INTRAVENOUS at 21:34

## 2025-01-28 RX ADMIN — Medication 1 DROP(S): at 14:33

## 2025-01-28 RX ADMIN — MORPHINE SULFATE 5 MILLIGRAM(S): 60 TABLET, FILM COATED, EXTENDED RELEASE ORAL at 21:33

## 2025-01-28 RX ADMIN — MORPHINE SULFATE 5 MILLIGRAM(S): 60 TABLET, FILM COATED, EXTENDED RELEASE ORAL at 08:32

## 2025-01-28 RX ADMIN — POLYETHYLENE GLYCOL 3350 17 GRAM(S): 17 POWDER, FOR SOLUTION ORAL at 11:59

## 2025-01-28 RX ADMIN — ACETAMINOPHEN 1000 MILLIGRAM(S): 160 SUSPENSION ORAL at 15:39

## 2025-01-28 RX ADMIN — LIDOCAINE HYDROCHLORIDE 1 PATCH: 30 CREAM TOPICAL at 19:02

## 2025-01-28 RX ADMIN — ENOXAPARIN SODIUM 50 MILLIGRAM(S): 100 INJECTION SUBCUTANEOUS at 18:39

## 2025-01-28 RX ADMIN — Medication 100 MILLIGRAM(S): at 05:16

## 2025-01-28 RX ADMIN — ACETAMINOPHEN 400 MILLIGRAM(S): 160 SUSPENSION ORAL at 05:16

## 2025-01-28 RX ADMIN — GABAPENTIN 300 MILLIGRAM(S): 800 TABLET ORAL at 21:40

## 2025-01-28 RX ADMIN — GABAPENTIN 300 MILLIGRAM(S): 800 TABLET ORAL at 09:26

## 2025-01-28 RX ADMIN — ENOXAPARIN SODIUM 60 MILLIGRAM(S): 100 INJECTION SUBCUTANEOUS at 05:16

## 2025-01-28 RX ADMIN — CEFTRIAXONE 100 MILLIGRAM(S): 250 INJECTION, POWDER, FOR SOLUTION INTRAMUSCULAR; INTRAVENOUS at 18:39

## 2025-01-28 RX ADMIN — MORPHINE SULFATE 5 MILLIGRAM(S): 60 TABLET, FILM COATED, EXTENDED RELEASE ORAL at 21:48

## 2025-01-28 RX ADMIN — MORPHINE SULFATE 5 MILLIGRAM(S): 60 TABLET, FILM COATED, EXTENDED RELEASE ORAL at 09:30

## 2025-01-28 NOTE — PROGRESS NOTE ADULT - SUBJECTIVE AND OBJECTIVE BOX
Patient is a 74y old  Female who presents with a chief complaint of pleural effusion (28 Jan 2025 12:03)      MEDICATIONS  (STANDING):  acetaminophen     Tablet .. 1000 milliGRAM(s) Oral every 8 hours  artificial tears (preservative free) Ophthalmic Solution 1 Drop(s) Both EYES three times a day  benzonatate 100 milliGRAM(s) Oral three times a day  enoxaparin Injectable 60 milliGRAM(s) SubCutaneous every 24 hours  famotidine    Tablet 20 milliGRAM(s) Oral at bedtime  gabapentin 300 milliGRAM(s) Oral <User Schedule>  gabapentin 200 milliGRAM(s) Oral <User Schedule>  lidocaine   4% Patch 1 Patch Transdermal daily  lidocaine   4% Patch 1 Patch Transdermal daily  pantoprazole    Tablet 40 milliGRAM(s) Oral before breakfast  polyethylene glycol 3350 17 Gram(s) Oral daily  senna 2 Tablet(s) Oral at bedtime  sodium chloride 0.9% Solution for Pleural Effusion 30 milliLiter(s) IntraPleural. every 12 hours    MEDICATIONS  (PRN):  aluminum hydroxide/magnesium hydroxide/simethicone Suspension 30 milliLiter(s) Oral every 4 hours PRN Dyspepsia  guaiFENesin  milliGRAM(s) Oral every 12 hours PRN Cough  melatonin 3 milliGRAM(s) Oral at bedtime PRN Insomnia  morphine   Solution 5 milliGRAM(s) Oral every 4 hours PRN Moderate-severe pain  ondansetron   Disintegrating Tablet 4 milliGRAM(s) Oral every 6 hours PRN Nausea and/or Vomiting  ondansetron Injectable 4 milliGRAM(s) IV Push every 8 hours PRN Nausea and/or Vomiting  sodium chloride 0.65% Nasal 1 Spray(s) Both Nostrils four times a day PRN Nasal Congestion      Vital Signs Last 24 Hrs  T(C): 36.6 (28 Jan 2025 10:00), Max: 37.1 (27 Jan 2025 16:03)  T(F): 97.9 (28 Jan 2025 10:00), Max: 98.8 (27 Jan 2025 16:03)  HR: 101 (28 Jan 2025 10:00) (95 - 104)  BP: 106/58 (28 Jan 2025 10:00) (103/60 - 116/68)  BP(mean): --  RR: 18 (28 Jan 2025 10:00) (17 - 18)  SpO2: 99% (28 Jan 2025 10:00) (98% - 99%)    Parameters below as of 28 Jan 2025 10:00  Patient On (Oxygen Delivery Method): room air    PE  NAD  Awake, alert  Anicteric, MMM  RRR  CTAB  Abd soft, NT, ND                        8.3    5.54  )-----------( 154      ( 28 Jan 2025 04:50 )             27.8       01-28    137  |  103  |  20  ----------------------------<  87  4.3   |  22  |  0.66    Ca    9.2      28 Jan 2025 04:50  Phos  3.5     01-28  Mg     1.90     01-28    TPro  5.7[L]  /  Alb  2.7[L]  /  TBili  <0.2  /  DBili  <0.2  /  AST  22  /  ALT  18  /  AlkPhos  379[H]  01-28

## 2025-01-28 NOTE — PROGRESS NOTE ADULT - ASSESSMENT
75 y/o F with history of recurrent rectal cancer admitted for dyspnea with right pleural effusion.     1. Recurrent metastatic rectal cancer  ·	Patient follows with [facility]   ·	Recent progression on FOLFOX  ·	Planning to switch to lonsurf and bevacizumab  ·	s/p palliative radiation therapy to the right rib area as well as spine.    ·	No disease directed therapy as inpatient  ·	Patient to follow with [facility] upon discharge  ·	Significant pain, would get palliative care to optimize pain management     2.  Dyspnea  ·	Patient has loculated right pleural effusion  ·	Appx 360cc clear yellow fluid aspirated. Drain placed to Pleur Evac.   ·	MIST dornase and alteplase treatments, completed protocol x6 treatments.   ·	Repeat CT with trace effusion. Persistent complete RML atelectasis. Still requiring 2L NC.   ·	Await pleural studies and cytology  ·	Appreciate CT surgery recs  ·	In setting of new DVT would consider obtaining CTA to assess for PE  ·	PTC removed. F/u rpt CXR. If rpt CXR stable, pt can be restarted on a/c  ·	Pt is not a candidate for any further thoracic surgical intervention  ·	Pt can f/u with DR Sultana as outpt in office 2-3 weeks after discharge to     3. Acute b/l LE DVT  ·	Advise full dose anticoagulation, heparin gtt or lovenox depending on what procedures are planned.  Howver, patient apprehensive about PO AC, wants lovenox      Will cont to follow,    Rachel Rosado NP  Hematology/Oncology  [facility]  [phone] (office)

## 2025-01-28 NOTE — DISCHARGE NOTE PROVIDER - CARE PROVIDER_API CALL
Dr Goldberg, Zoe,   your oncologist  Phone: (   )    -  Fax: (   )    -  Follow Up Time:     Allen Sultana  Thoracic Surgery  [address] [place] [address], [address] [place] [address]  Phone: [phone]  Fax: [phone]  Follow Up Time:     Dr Hermilo Noe,   [facility] palliative care provider  Phone: (   )    -  Fax: (   )    -  Follow Up Time:

## 2025-01-28 NOTE — DISCHARGE NOTE PROVIDER - NSDCMRMEDTOKEN_GEN_ALL_CORE_FT
acetaminophen 325 mg oral tablet: 2 tab(s) orally every 6 hours As needed Temp greater or equal to 38C (100.4F), Mild Pain (1 - 3)  enoxaparin 60 mg/0.6 mL injectable solution: 60 milligram(s) subcutaneously once a day Re-start tomorrow 10/31  famotidine 10 mg oral tablet: 1 tab(s) orally once a day (at bedtime)  gabapentin 100 mg oral tablet: orally 3 times a day  ibuprofen 400 mg oral tablet: 1 tab(s) orally 3 times a day As needed Moderate Pain (4 - 6)  pantoprazole 40 mg oral delayed release tablet: 1 tab(s) orally once a day (at bedtime)  Vitamin D3 2000 intl units (50 mcg) oral tablet: 1 tab(s) orally once a day   acetaminophen 325 mg oral tablet: 2 tab(s) orally every 6 hours As needed Temp greater or equal to 38C (100.4F), Mild Pain (1 - 3)  enoxaparin 60 mg/0.6 mL injectable solution: 60 milligram(s) subcutaneously once a day Re-start tomorrow 10/31  famotidine 10 mg oral tablet: 1 tab(s) orally once a day (at bedtime)  gabapentin 100 mg oral tablet: orally 3 times a day  ibuprofen 400 mg oral tablet: 1 tab(s) orally 3 times a day As needed Moderate Pain (4 - 6)  morphine 10 mg/5 mL oral solution: 2.5 milliliter(s) orally every 4 hours as needed for Moderate-severe pain MDD: 15ml  pantoprazole 40 mg oral delayed release tablet: 1 tab(s) orally once a day (at bedtime)  Vitamin D3 2000 intl units (50 mcg) oral tablet: 1 tab(s) orally once a day   acetaminophen 325 mg oral tablet: 2 tab(s) orally every 6 hours As needed Temp greater or equal to 38C (100.4F), Mild Pain (1 - 3)  ALPRAZolam 0.25 mg oral tablet: 1 tab(s) orally once a day MDD: 1 tab  docusate sodium 100 mg oral capsule: 1 cap(s) orally once a day  enoxaparin 60 mg/0.6 mL injectable solution: 60 milligram(s) subcutaneously once a day Re-start tomorrow 10/31  famotidine 10 mg oral tablet: 1 tab(s) orally once a day (at bedtime)  gabapentin 100 mg oral capsule: 2 cap(s) orally once a day please take at 3pm  gabapentin 300 mg oral capsule: 1 cap(s) orally 2 times a day please take at 9am and 9pm  ibuprofen 400 mg oral tablet: 1 tab(s) orally 3 times a day As needed Moderate Pain (4 - 6)  morphine 10 mg/5 mL oral solution: 3.75 milliliter(s) orally every 4 hours as needed for Moderate-severe pain MDD: 26.25 ml  Narcan 4 mg/0.1 mL nasal spray: 1 spray(s) intranasally every minute as needed for lethargy  pantoprazole 40 mg oral delayed release tablet: 1 tab(s) orally once a day (at bedtime)  polyethylene glycol 3350 oral powder for reconstitution: 17 gram(s) orally once a day  Vitamin D3 2000 intl units (50 mcg) oral tablet: 1 tab(s) orally once a day

## 2025-01-28 NOTE — DISCHARGE NOTE PROVIDER - NSDCFUADDAPPT_GEN_ALL_CORE_FT
APPTS ARE READY TO BE MADE: [X] YES    Best Family or Patient Contact (if needed):    Additional Information about above appointments (if needed):    1: cardiologist  2:   3:     Other comments or requests:    APPTS ARE READY TO BE MADE: [X] YES    Best Family or Patient Contact (if needed):    Additional Information about above appointments (if needed):    1: oncologist/primary care provider Dr Goldberg, Zoe   2: Dr Sultana  3:     Other comments or requests:    APPTS ARE READY TO BE MADE: [X] YES    Best Family or Patient Contact (if needed):    Additional Information about above appointments (if needed):    1: oncologist/primary care provider Dr Goldberg, Zoe   2: Dr Sultana  3:     Other comments or requests:     Patient advised they did not want to proceed with scheduling appointments with the providers on their referrals. They will coordinate care on their own.

## 2025-01-28 NOTE — DISCHARGE NOTE PROVIDER - HOSPITAL COURSE
74F PMH of rectal cancer w/ local pelvic recurrence, metastases to lungs/bones, s/p FOLFOX (10/21/24), PE and DVT on lovenox, initially presenting to OSH for R-sided chest pain, subsequently found to have large R pleural effusion and transferred to [facility] for further management.    Hospital course:    Pleural effusion, right  - CT w/ moderate loculated R pleural effusion  - presumed malignant i/s/o lung metastasis  - CXR now w/ improving loculated effusion s/p MIST treatments  - IR consulted: s/p R chest tube placement 1/21  - CTSx: s/p MIST treatment x6; c/w R chest tube to suction   - CT chest trace residual Rt pleural effusion S/P CT with improved aeration of RUL. Persistent complete RML atelectasis and partial atelectasis of the RLL, unchanged. New small right pneumothorax.  - chest tube removed 1/27  - pleural fluid path: rare atypical cells with enlarged pleomorphic nuclei and scanty cytoplasm. Reactice mesothelial cells with proteinaceous material.       Colorectal cancer  - metastatic, in process of transitioning chemotherapy regimens  - onc consulted: outpatient follow up with Dr. Goldberg at [facility] [place]  - palliative care consulted: c/w tylenol, morphine solution, gabapentin    Pulmonary embolism/DVT  - Lovenox 60mg daily adjusted given high risk for bleeding due to colo vesicular fistular   - Doppler LE - acute DVT above/below knee in RLE. 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 - UCx E. coli    74F PMH of rectal cancer w/ local pelvic recurrence, metastases to lungs/bones, s/p FOLFOX (10/21/24), PE and DVT on lovenox, initially presenting to OSH for R-sided chest pain, subsequently found to have large R pleural effusion and transferred to [facility] for further management.    Hospital course:    Pleural effusion, right  - CT w/ moderate loculated R pleural effusion  - presumed malignant i/s/o lung metastasis  - CXR now w/ improving loculated effusion s/p MIST treatments  - IR consulted: s/p R chest tube placement 1/21  - CTSx: s/p MIST treatment x6; c/w R chest tube to suction   - CT chest trace residual Rt pleural effusion S/P CT with improved aeration of RUL. Persistent complete RML atelectasis and partial atelectasis of the RLL, unchanged. New small right pneumothorax.  - chest tube removed 1/27  - pleural fluid path: rare atypical cells with enlarged pleomorphic nuclei and scanty cytoplasm. Reactice mesothelial cells with proteinaceous material.       Colorectal cancer  - metastatic, in process of transitioning chemotherapy regimens  - onc consulted: outpatient follow up with Dr. Goldberg at [facility] [place]  - palliative care consulted: c/w tylenol, morphine solution, gabapentin    Pulmonary embolism/DVT  - Lovenox 60mg daily adjusted given high risk for bleeding due to colo vesicular fistular   - Doppler LE - acute DVT above/below knee in RLE. 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 - UCx E. coli     Case discussed with attending pt medically cleared for discharge. Reviewed discharge medications with patient; All new medications requiring new prescription sent to pharmacy of patients choice. Reviewed need for prescription for previous home medication and new prescriptions sent if requested. Patient in agreement and understands.

## 2025-01-28 NOTE — DISCHARGE NOTE PROVIDER - PROVIDER TOKENS
FREE:[LAST:[Dr Goldberg, Zoe],PHONE:[(   )    -],FAX:[(   )    -],ADDRESS:[your oncologist]],PROVIDER:[TOKEN:[[id number]]],FREE:[LAST:[Dr Hermilo Noe],PHONE:[(   )    -],FAX:[(   )    -],ADDRESS:[[facility] [place]]]

## 2025-01-28 NOTE — PROGRESS NOTE ADULT - SUBJECTIVE AND OBJECTIVE BOX
Name of Patient : TAHIR PENA  MRN: [id number]  Date of visit: 25     Subjective: Patient seen and examined. No new events except as noted.   chest tube removed   positive fr DVT  started on full dose lovenox       REVIEW OF SYSTEMS:    CONSTITUTIONAL: No weakness, fevers or chills  EYES/ENT: No visual changes;  No vertigo or throat pain   NECK: No pain or stiffness  RESPIRATORY: No cough, wheezing, hemoptysis; No shortness of breath  CARDIOVASCULAR: No chest pain or palpitations  GASTROINTESTINAL: No abdominal or epigastric pain. No nausea, vomiting, or hematemesis; No diarrhea or constipation. No melena or hematochezia.  GENITOURINARY: No dysuria, frequency or hematuria  NEUROLOGICAL: No numbness or weakness  SKIN: No itching, burning, rashes, or lesions   All other review of systems is negative unless indicated above.    MEDICATIONS:  MEDICATIONS  (STANDING):  acetaminophen     Tablet .. 1000 milliGRAM(s) Oral every 8 hours  artificial tears (preservative free) Ophthalmic Solution 1 Drop(s) Both EYES three times a day  benzonatate 100 milliGRAM(s) Oral three times a day  enoxaparin Injectable 50 milliGRAM(s) SubCutaneous every 12 hours  famotidine    Tablet 20 milliGRAM(s) Oral at bedtime  gabapentin 300 milliGRAM(s) Oral <User Schedule>  gabapentin 200 milliGRAM(s) Oral <User Schedule>  lidocaine   4% Patch 1 Patch Transdermal daily  lidocaine   4% Patch 1 Patch Transdermal daily  pantoprazole    Tablet 40 milliGRAM(s) Oral before breakfast  polyethylene glycol 3350 17 Gram(s) Oral daily  senna 2 Tablet(s) Oral at bedtime  sodium chloride 0.9% Solution for Pleural Effusion 30 milliLiter(s) IntraPleural. every 12 hours      PHYSICAL EXAM:  T(C): 36.6 (25 @ 14:00), Max: 37 (25 @ 21:00)  HR: 98 (25 @ 14:00) (95 - 104)  BP: 112/60 (25 @ 14:00) (106/58 - 116/68)  RR: 18 (25 @ 14:00) (18 - 18)  SpO2: 100% (25 @ 14:00) (99% - 100%)  Wt(kg): --  I&O's Summary    2025 07:  -  2025 16:43  --------------------------------------------------------  IN: 0 mL / OUT: 700 mL / NET: -700 mL          Appearance: Normal	  HEENT:  PERRLA   Lymphatic: No lymphadenopathy   Cardiovascular: Normal S1 S2, no JVD  Respiratory: normal effort , clear  Gastrointestinal:  Soft, Non-tender  Skin: No rashes,  warm to touch  Psychiatry:  Mood & affect appropriate  Musculuskeletal: No edema    recent labs, Imaging and EKGs personally reviewed   CODE status discussed with the patient in detail    Urinalysis with Rflx Culture (collected 25 @ 14:55)  25 @ 07:01  -  25 @ 16:43  --------------------------------------------------------  IN: 0 mL / OUT: 700 mL / NET: -700 mL                          8.3    5.54  )-----------( 154      ( 2025 04:50 )             27.8                   137  |  103  |  20  ----------------------------<  87  4.3   |  22  |  0.66    Ca    9.2      2025 04:50  Phos  3.5       Mg     1.90         TPro  5.7[L]  /  Alb  2.7[L]  /  TBili  <0.2  /  DBili  <0.2  /  AST  22  /  ALT  18  /  AlkPhos  379[H]                         Urinalysis Basic - ( 2025 14:55 )    Color: Dark Yellow / Appearance: Turbid / S.046 / pH: x  Gluc: x / Ketone: Negative mg/dL  / Bili: Negative / Urobili: 1.0 mg/dL   Blood: x / Protein: 300 mg/dL / Nitrite: Positive   Leuk Esterase: Trace / RBC: 30 /HPF / WBC 37 /HPF   Sq Epi: x / Non Sq Epi: 1 /HPF / Bacteria: Many /HPF

## 2025-01-28 NOTE — DISCHARGE NOTE PROVIDER - NSDCCPCAREPLAN_GEN_ALL_CORE_FT
PRINCIPAL DISCHARGE DIAGNOSIS  Diagnosis: Pleural effusion, right  Assessment and Plan of Treatment: you were found with fluid in right lung seen by cardiothoracic surgery. fluid was drain this admission with chest tube and tube was removed on 1/27. Followup with Dr Sultana in 2-3 weeks to assess for reacculmulation of fluid      SECONDARY DISCHARGE DIAGNOSES  Diagnosis: Colorectal cancer  Assessment and Plan of Treatment: Followup with your oncologist Dr Zoe Goldberg.    Diagnosis: Pulmonary embolism  Assessment and Plan of Treatment: you have history of blood clot in lung and leg, continue lovenox injection (adjusted dose). you had IVC filter placed this admission on 1/21. Followup with your primary care provider in 1-2 week    Diagnosis: Cancer related pain  Assessment and Plan of Treatment:      PRINCIPAL DISCHARGE DIAGNOSIS  Diagnosis: Pleural effusion, right  Assessment and Plan of Treatment: you were found with fluid in right lung seen by cardiothoracic surgery. fluid was drain this admission with chest tube and tube was removed on 1/27. Followup with Dr Sultana in 2-3 weeks to assess for reacculmulation of fluid      SECONDARY DISCHARGE DIAGNOSES  Diagnosis: Colorectal cancer  Assessment and Plan of Treatment: Followup with your oncologist Dr Zoe Goldberg.    Diagnosis: Pulmonary embolism  Assessment and Plan of Treatment: you have history of blood clot in lung and leg, continue lovenox injection (adjusted dose). you had IVC filter placed this admission on 1/21. Followup with your primary care provider in 1-2 week    Diagnosis: Cancer related pain  Assessment and Plan of Treatment: Please continue with morphine as directed for pain. Please do not take Xanax within two hours of taking morphine. Follow-up with Dr. Noe for further pain management after discharge.     PRINCIPAL DISCHARGE DIAGNOSIS  Diagnosis: Pleural effusion, right  Assessment and Plan of Treatment: you were found with fluid in right lung seen by cardiothoracic surgery. fluid was drain this admission with chest tube and tube was removed on 1/27. Please continue to use supplemental oxygen as needed. Followup with Dr Sultana in 2-3 weeks to assess for reacculmulation of fluid      SECONDARY DISCHARGE DIAGNOSES  Diagnosis: Colorectal cancer  Assessment and Plan of Treatment: Followup with your oncologist Dr Zoe Goldberg.    Diagnosis: Pulmonary embolism  Assessment and Plan of Treatment: you have history of blood clot in lung and leg, continue lovenox injection (adjusted dose). you had IVC filter placed this admission on 1/21. Followup with your primary care provider in 1-2 week    Diagnosis: Cancer related pain  Assessment and Plan of Treatment: Please continue with morphine as directed for pain. Please do not take Xanax within two hours of taking morphine. Follow-up with Dr. Noe for further pain management after discharge.

## 2025-01-28 NOTE — DISCHARGE NOTE PROVIDER - CARE PROVIDERS DIRECT ADDRESSES
,DirectAddress_Unknown,[email or web address],DirectAddress_Unknown

## 2025-01-29 LAB
ALBUMIN SERPL ELPH-MCNC: 2.6 G/DL — LOW (ref 3.3–5)
ALP SERPL-CCNC: 378 U/L — HIGH (ref 40–120)
ALT FLD-CCNC: 19 U/L — SIGNIFICANT CHANGE UP (ref 4–33)
ANION GAP SERPL CALC-SCNC: 10 MMOL/L — SIGNIFICANT CHANGE UP (ref 7–14)
AST SERPL-CCNC: 17 U/L — SIGNIFICANT CHANGE UP (ref 4–32)
BILIRUB DIRECT SERPL-MCNC: <0.2 MG/DL — SIGNIFICANT CHANGE UP (ref 0–0.3)
BILIRUB INDIRECT FLD-MCNC: SIGNIFICANT CHANGE UP MG/DL (ref 0–1)
BILIRUB SERPL-MCNC: <0.2 MG/DL — SIGNIFICANT CHANGE UP (ref 0.2–1.2)
BUN SERPL-MCNC: 20 MG/DL — SIGNIFICANT CHANGE UP (ref 7–23)
CALCIUM SERPL-MCNC: 9.4 MG/DL — SIGNIFICANT CHANGE UP (ref 8.4–10.5)
CHLORIDE SERPL-SCNC: 104 MMOL/L — SIGNIFICANT CHANGE UP (ref 98–107)
CO2 SERPL-SCNC: 23 MMOL/L — SIGNIFICANT CHANGE UP (ref 22–31)
CREAT SERPL-MCNC: 0.65 MG/DL — SIGNIFICANT CHANGE UP (ref 0.5–1.3)
EGFR: 93 ML/MIN/1.73M2 — SIGNIFICANT CHANGE UP
GLUCOSE SERPL-MCNC: 90 MG/DL — SIGNIFICANT CHANGE UP (ref 70–99)
HCT VFR BLD CALC: 25.4 % — LOW (ref 34.5–45)
HGB BLD-MCNC: 7.9 G/DL — LOW (ref 11.5–15.5)
MAGNESIUM SERPL-MCNC: 2 MG/DL — SIGNIFICANT CHANGE UP (ref 1.6–2.6)
MCHC RBC-ENTMCNC: 29.5 PG — SIGNIFICANT CHANGE UP (ref 27–34)
MCHC RBC-ENTMCNC: 31.1 G/DL — LOW (ref 32–36)
MCV RBC AUTO: 94.8 FL — SIGNIFICANT CHANGE UP (ref 80–100)
NON-GYNECOLOGICAL CYTOLOGY STUDY: SIGNIFICANT CHANGE UP
NRBC # BLD AUTO: 0 K/UL — SIGNIFICANT CHANGE UP (ref 0–0)
NRBC # BLD: 0 /100 WBCS — SIGNIFICANT CHANGE UP (ref 0–0)
NRBC # FLD: 0 K/UL — SIGNIFICANT CHANGE UP (ref 0–0)
NRBC BLD-RTO: 0 /100 WBCS — SIGNIFICANT CHANGE UP (ref 0–0)
PHOSPHATE SERPL-MCNC: 3.2 MG/DL — SIGNIFICANT CHANGE UP (ref 2.5–4.5)
PLATELET # BLD AUTO: 172 K/UL — SIGNIFICANT CHANGE UP (ref 150–400)
POTASSIUM SERPL-MCNC: 4.1 MMOL/L — SIGNIFICANT CHANGE UP (ref 3.5–5.3)
POTASSIUM SERPL-SCNC: 4.1 MMOL/L — SIGNIFICANT CHANGE UP (ref 3.5–5.3)
PROT SERPL-MCNC: 6 G/DL — SIGNIFICANT CHANGE UP (ref 6–8.3)
RBC # BLD: 2.68 M/UL — LOW (ref 3.8–5.2)
RBC # FLD: 17.3 % — HIGH (ref 10.3–14.5)
SODIUM SERPL-SCNC: 137 MMOL/L — SIGNIFICANT CHANGE UP (ref 135–145)
WBC # BLD: 5.76 K/UL — SIGNIFICANT CHANGE UP (ref 3.8–10.5)
WBC # FLD AUTO: 5.76 K/UL — SIGNIFICANT CHANGE UP (ref 3.8–10.5)

## 2025-01-29 PROCEDURE — 71260 CT THORAX DX C+: CPT | Mod: 26

## 2025-01-29 PROCEDURE — 74177 CT ABD & PELVIS W/CONTRAST: CPT | Mod: 26

## 2025-01-29 RX ORDER — ACETAMINOPHEN 160 MG/5ML
1000 SUSPENSION ORAL ONCE
Refills: 0 | Status: DISCONTINUED | OUTPATIENT
Start: 2025-01-29 | End: 2025-01-29

## 2025-01-29 RX ORDER — ACETAMINOPHEN 160 MG/5ML
1000 SUSPENSION ORAL EVERY 8 HOURS
Refills: 0 | Status: DISCONTINUED | OUTPATIENT
Start: 2025-01-29 | End: 2025-02-06

## 2025-01-29 RX ORDER — SODIUM CHLORIDE 9 G/ML
1000 INJECTION, SOLUTION INTRAVENOUS
Refills: 0 | Status: DISCONTINUED | OUTPATIENT
Start: 2025-01-29 | End: 2025-01-29

## 2025-01-29 RX ADMIN — ACETAMINOPHEN 1000 MILLIGRAM(S): 160 SUSPENSION ORAL at 00:32

## 2025-01-29 RX ADMIN — GABAPENTIN 300 MILLIGRAM(S): 800 TABLET ORAL at 21:17

## 2025-01-29 RX ADMIN — MORPHINE SULFATE 5 MILLIGRAM(S): 60 TABLET, FILM COATED, EXTENDED RELEASE ORAL at 16:33

## 2025-01-29 RX ADMIN — CEFTRIAXONE 100 MILLIGRAM(S): 250 INJECTION, POWDER, FOR SOLUTION INTRAMUSCULAR; INTRAVENOUS at 18:05

## 2025-01-29 RX ADMIN — MORPHINE SULFATE 5 MILLIGRAM(S): 60 TABLET, FILM COATED, EXTENDED RELEASE ORAL at 07:30

## 2025-01-29 RX ADMIN — Medication 100 MILLIGRAM(S): at 21:17

## 2025-01-29 RX ADMIN — Medication 1 DROP(S): at 17:10

## 2025-01-29 RX ADMIN — MORPHINE SULFATE 5 MILLIGRAM(S): 60 TABLET, FILM COATED, EXTENDED RELEASE ORAL at 12:37

## 2025-01-29 RX ADMIN — GABAPENTIN 300 MILLIGRAM(S): 800 TABLET ORAL at 10:24

## 2025-01-29 RX ADMIN — MORPHINE SULFATE 5 MILLIGRAM(S): 60 TABLET, FILM COATED, EXTENDED RELEASE ORAL at 01:50

## 2025-01-29 RX ADMIN — Medication 1 DROP(S): at 10:29

## 2025-01-29 RX ADMIN — ACETAMINOPHEN 1000 MILLIGRAM(S): 160 SUSPENSION ORAL at 08:48

## 2025-01-29 RX ADMIN — ENOXAPARIN SODIUM 50 MILLIGRAM(S): 100 INJECTION SUBCUTANEOUS at 07:22

## 2025-01-29 RX ADMIN — Medication 100 MILLIGRAM(S): at 10:29

## 2025-01-29 RX ADMIN — Medication 1 DROP(S): at 21:18

## 2025-01-29 RX ADMIN — MORPHINE SULFATE 5 MILLIGRAM(S): 60 TABLET, FILM COATED, EXTENDED RELEASE ORAL at 11:28

## 2025-01-29 RX ADMIN — FAMOTIDINE 20 MILLIGRAM(S): 10 INJECTION INTRAVENOUS at 21:18

## 2025-01-29 RX ADMIN — ACETAMINOPHEN 1000 MILLIGRAM(S): 160 SUSPENSION ORAL at 19:22

## 2025-01-29 RX ADMIN — PANTOPRAZOLE 40 MILLIGRAM(S): 20 TABLET, DELAYED RELEASE ORAL at 09:47

## 2025-01-29 RX ADMIN — MORPHINE SULFATE 5 MILLIGRAM(S): 60 TABLET, FILM COATED, EXTENDED RELEASE ORAL at 07:15

## 2025-01-29 RX ADMIN — ACETAMINOPHEN 1000 MILLIGRAM(S): 160 SUSPENSION ORAL at 18:03

## 2025-01-29 RX ADMIN — GABAPENTIN 200 MILLIGRAM(S): 800 TABLET ORAL at 17:11

## 2025-01-29 RX ADMIN — Medication 100 MILLIGRAM(S): at 17:10

## 2025-01-29 RX ADMIN — Medication 2 TABLET(S): at 21:17

## 2025-01-29 RX ADMIN — ACETAMINOPHEN 1000 MILLIGRAM(S): 160 SUSPENSION ORAL at 07:48

## 2025-01-29 RX ADMIN — ENOXAPARIN SODIUM 50 MILLIGRAM(S): 100 INJECTION SUBCUTANEOUS at 17:10

## 2025-01-29 NOTE — PROGRESS NOTE ADULT - SUBJECTIVE AND OBJECTIVE BOX
Name of Patient : TAHIR PENA  MRN: [id number]  Date of visit: 01-29-25      Subjective: Patient seen and examined. No new events except as noted.   armando hammond  on lovenox bid     REVIEW OF SYSTEMS:    CONSTITUTIONAL: No weakness, fevers or chills  EYES/ENT: No visual changes;  No vertigo or throat pain   NECK: No pain or stiffness  RESPIRATORY: No cough, wheezing, hemoptysis; No shortness of breath  CARDIOVASCULAR: No chest pain or palpitations  GASTROINTESTINAL: No abdominal or epigastric pain. No nausea, vomiting, or hematemesis; No diarrhea or constipation. No melena or hematochezia.  GENITOURINARY: No dysuria, frequency or hematuria  NEUROLOGICAL: No numbness or weakness  SKIN: No itching, burning, rashes, or lesions   All other review of systems is negative unless indicated above.    MEDICATIONS:  MEDICATIONS  (STANDING):  artificial tears (preservative free) Ophthalmic Solution 1 Drop(s) Both EYES three times a day  benzonatate 100 milliGRAM(s) Oral three times a day  cefTRIAXone   IVPB 1000 milliGRAM(s) IV Intermittent every 24 hours  enoxaparin Injectable 50 milliGRAM(s) SubCutaneous every 12 hours  famotidine    Tablet 20 milliGRAM(s) Oral at bedtime  gabapentin 200 milliGRAM(s) Oral <User Schedule>  gabapentin 300 milliGRAM(s) Oral <User Schedule>  lactated ringers. 1000 milliLiter(s) (75 mL/Hr) IV Continuous <Continuous>  lidocaine   4% Patch 1 Patch Transdermal daily  pantoprazole    Tablet 40 milliGRAM(s) Oral before breakfast  polyethylene glycol 3350 17 Gram(s) Oral daily  senna 2 Tablet(s) Oral at bedtime  sodium chloride 0.9% Solution for Pleural Effusion 30 milliLiter(s) IntraPleural. every 12 hours      PHYSICAL EXAM:  T(C): 36.7 (01-29-25 @ 17:30), Max: 37 (01-28-25 @ 21:33)  HR: 106 (01-29-25 @ 17:30) (96 - 106)  BP: 126/69 (01-29-25 @ 17:30) (100/62 - 126/69)  RR: 18 (01-29-25 @ 17:30) (18 - 18)  SpO2: 98% (01-29-25 @ 17:30) (95% - 98%)  Wt(kg): --  I&O's Summary    28 Jan 2025 07:01  -  29 Jan 2025 07:00  --------------------------------------------------------  IN: 0 mL / OUT: 700 mL / NET: -700 mL          Appearance: Normal	  HEENT:  PERRLA   Lymphatic: No lymphadenopathy   Cardiovascular: Normal S1 S2, no JVD  Respiratory: normal effort , clear  Gastrointestinal:  Soft, Non-tender  Skin: No rashes,  warm to touch  Psychiatry:  Mood & affect appropriate  Musculuskeletal: No edema    recent labs, Imaging and EKGs personally reviewed   CODE status discussed with the patient in detail    01-28-25 @ 07:01  -  01-29-25 @ 07:00  --------------------------------------------------------  IN: 0 mL / OUT: 700 mL / NET: -700 mL                            7.9    5.76  )-----------( 172      ( 29 Jan 2025 05:46 )             25.4               01-29    137  |  104  |  20  ----------------------------<  90  4.1   |  23  |  0.65    Ca    9.4      29 Jan 2025 05:46  Phos  3.2     01-29  Mg     2.00     01-29    TPro  6.0  /  Alb  2.6[L]  /  TBili  <0.2  /  DBili  <0.2  /  AST  17  /  ALT  19  /  AlkPhos  378[H]  01-29                       Urinalysis Basic - ( 29 Jan 2025 05:46 )    Color: x / Appearance: x / SG: x / pH: x  Gluc: 90 mg/dL / Ketone: x  / Bili: x / Urobili: x   Blood: x / Protein: x / Nitrite: x   Leuk Esterase: x / RBC: x / WBC x   Sq Epi: x / Non Sq Epi: x / Bacteria: x

## 2025-01-29 NOTE — PROGRESS NOTE ADULT - SUBJECTIVE AND OBJECTIVE BOX
Patient is a 74y old  Female who presents with a chief complaint of pleural effusion (28 Jan 2025 16:43)  Patient with new acute bilateral lower extremity DVT, anticoagulation with Lovenox 50 mg BID      MEDICATIONS  (STANDING):  acetaminophen     Tablet .. 1000 milliGRAM(s) Oral every 8 hours  artificial tears (preservative free) Ophthalmic Solution 1 Drop(s) Both EYES three times a day  benzonatate 100 milliGRAM(s) Oral three times a day  cefTRIAXone   IVPB 1000 milliGRAM(s) IV Intermittent every 24 hours  enoxaparin Injectable 50 milliGRAM(s) SubCutaneous every 12 hours  famotidine    Tablet 20 milliGRAM(s) Oral at bedtime  gabapentin 200 milliGRAM(s) Oral <User Schedule>  gabapentin 300 milliGRAM(s) Oral <User Schedule>  lidocaine   4% Patch 1 Patch Transdermal daily  lidocaine   4% Patch 1 Patch Transdermal daily  pantoprazole    Tablet 40 milliGRAM(s) Oral before breakfast  polyethylene glycol 3350 17 Gram(s) Oral daily  senna 2 Tablet(s) Oral at bedtime  sodium chloride 0.9% Solution for Pleural Effusion 30 milliLiter(s) IntraPleural. every 12 hours    MEDICATIONS  (PRN):  aluminum hydroxide/magnesium hydroxide/simethicone Suspension 30 milliLiter(s) Oral every 4 hours PRN Dyspepsia  guaiFENesin  milliGRAM(s) Oral every 12 hours PRN Cough  melatonin 3 milliGRAM(s) Oral at bedtime PRN Insomnia  morphine   Solution 5 milliGRAM(s) Oral every 4 hours PRN Moderate-severe pain  ondansetron   Disintegrating Tablet 4 milliGRAM(s) Oral every 6 hours PRN Nausea and/or Vomiting  ondansetron Injectable 4 milliGRAM(s) IV Push every 8 hours PRN Nausea and/or Vomiting  sodium chloride 0.65% Nasal 1 Spray(s) Both Nostrils four times a day PRN Nasal Congestion        Vital Signs Last 24 Hrs  T(C): 36.8 (29 Jan 2025 02:21), Max: 37 (28 Jan 2025 21:33)  T(F): 98.3 (29 Jan 2025 02:21), Max: 98.6 (28 Jan 2025 21:33)  HR: 96 (29 Jan 2025 02:21) (95 - 103)  BP: 100/62 (29 Jan 2025 02:21) (98/54 - 113/54)  BP(mean): --  RR: 18 (29 Jan 2025 02:21) (18 - 18)  SpO2: 96% (29 Jan 2025 02:21) (96% - 100%)    Parameters below as of 29 Jan 2025 02:21  Patient On (Oxygen Delivery Method): room air        PE  NAD  Awake, alert  Anicteric, MMM  RRR  CTAB  Abd soft, NT, ND  No c/c/e  No rash grossly                            8.3    5.54  )-----------( 154      ( 28 Jan 2025 04:50 )             27.8       01-28    137  |  103  |  20  ----------------------------<  87  4.3   |  22  |  0.66    Ca    9.2      28 Jan 2025 04:50  Phos  3.5     01-28  Mg     1.90     01-28    TPro  5.7[L]  /  Alb  2.7[L]  /  TBili  <0.2  /  DBili  <0.2  /  AST  22  /  ALT  18  /  AlkPhos  379[H]  01-28

## 2025-01-29 NOTE — PROGRESS NOTE ADULT - ASSESSMENT
75 y/o F with history of recurrent rectal cancer admitted for dyspnea with right pleural effusion.     1. Recurrent metastatic rectal cancer  ·	Patient follows with [facility]   ·	Recent progression on FOLFOX  ·	Planning to switch to lonsurf and bevacizumab  ·	s/p palliative radiation therapy to the right rib area as well as spine.    ·	No disease directed therapy as inpatient  ·	Patient to follow with [facility] upon discharge  ·	still complains of uncontrolled pain,  palliative care to optimize pain management     2.  Dyspnea  ·	Patient has loculated right pleural effusion  ·	Appx 360cc clear yellow fluid aspirated. Drain placed to Pleur Evac.   ·	MIST dornase and alteplase treatments, completed protocol x6 treatments.   ·	Repeat CT with trace effusion. Persistent complete RML atelectasis. 02 sat 96% room air  ·	Await pleural studies and cytology  ·	Appreciate CT surgery recs  ·	In setting of new DVT would consider obtaining CTA to assess for PE  ·	PTC removed  ·	Pt is not a candidate for any further thoracic surgical intervention  ·	Pt can f/u with DR Sultana as outpt in office 2-3 weeks after discharge to     3. Acute b/l LE DVT  ·	Doppler with Acute deep venous thrombosis: above and below the knee. Extensive DVT throughout the RIGHT lower extremity. DVT in the LEFT femoral vein.  ·	Advise full dose anticoagulation, currently on Lovenox 50 mg BID      Will cont to follow,    Danay Eaton NP  Hematology/Oncology  [facility]  [phone] (Office)  [phone] (Alt office)  Evenings and weekends please call MD on call or office

## 2025-01-30 ENCOUNTER — APPOINTMENT (OUTPATIENT)
Dept: VASCULAR SURGERY | Facility: HOSPITAL | Age: 75
End: 2025-01-30

## 2025-01-30 LAB
ANION GAP SERPL CALC-SCNC: 12 MMOL/L — SIGNIFICANT CHANGE UP (ref 7–14)
APTT BLD: 32.4 SEC — SIGNIFICANT CHANGE UP (ref 24.5–35.6)
BLD GP AB SCN SERPL QL: NEGATIVE — SIGNIFICANT CHANGE UP
BUN SERPL-MCNC: 19 MG/DL — SIGNIFICANT CHANGE UP (ref 7–23)
CALCIUM SERPL-MCNC: 9.5 MG/DL — SIGNIFICANT CHANGE UP (ref 8.4–10.5)
CHLORIDE SERPL-SCNC: 104 MMOL/L — SIGNIFICANT CHANGE UP (ref 98–107)
CO2 SERPL-SCNC: 22 MMOL/L — SIGNIFICANT CHANGE UP (ref 22–31)
CREAT SERPL-MCNC: 0.6 MG/DL — SIGNIFICANT CHANGE UP (ref 0.5–1.3)
EGFR: 95 ML/MIN/1.73M2 — SIGNIFICANT CHANGE UP
GLUCOSE SERPL-MCNC: 94 MG/DL — SIGNIFICANT CHANGE UP (ref 70–99)
HCT VFR BLD CALC: 27.7 % — LOW (ref 34.5–45)
HGB BLD-MCNC: 8.1 G/DL — LOW (ref 11.5–15.5)
INR BLD: 1.06 RATIO — SIGNIFICANT CHANGE UP (ref 0.85–1.16)
MAGNESIUM SERPL-MCNC: 1.9 MG/DL — SIGNIFICANT CHANGE UP (ref 1.6–2.6)
MCHC RBC-ENTMCNC: 28.6 PG — SIGNIFICANT CHANGE UP (ref 27–34)
MCHC RBC-ENTMCNC: 29.2 G/DL — LOW (ref 32–36)
MCV RBC AUTO: 97.9 FL — SIGNIFICANT CHANGE UP (ref 80–100)
NRBC # BLD AUTO: 0 K/UL — SIGNIFICANT CHANGE UP (ref 0–0)
NRBC # BLD: 0 /100 WBCS — SIGNIFICANT CHANGE UP (ref 0–0)
NRBC # FLD: 0 K/UL — SIGNIFICANT CHANGE UP (ref 0–0)
NRBC BLD-RTO: 0 /100 WBCS — SIGNIFICANT CHANGE UP (ref 0–0)
PHOSPHATE SERPL-MCNC: 3.5 MG/DL — SIGNIFICANT CHANGE UP (ref 2.5–4.5)
PLATELET # BLD AUTO: 184 K/UL — SIGNIFICANT CHANGE UP (ref 150–400)
POTASSIUM SERPL-MCNC: 4.1 MMOL/L — SIGNIFICANT CHANGE UP (ref 3.5–5.3)
POTASSIUM SERPL-SCNC: 4.1 MMOL/L — SIGNIFICANT CHANGE UP (ref 3.5–5.3)
PROTHROM AB SERPL-ACNC: 12.6 SEC — SIGNIFICANT CHANGE UP (ref 9.9–13.4)
RBC # BLD: 2.83 M/UL — LOW (ref 3.8–5.2)
RBC # FLD: 17.3 % — HIGH (ref 10.3–14.5)
RH IG SCN BLD-IMP: POSITIVE — SIGNIFICANT CHANGE UP
SODIUM SERPL-SCNC: 138 MMOL/L — SIGNIFICANT CHANGE UP (ref 135–145)
WBC # BLD: 6.12 K/UL — SIGNIFICANT CHANGE UP (ref 3.8–10.5)
WBC # FLD AUTO: 6.12 K/UL — SIGNIFICANT CHANGE UP (ref 3.8–10.5)

## 2025-01-30 PROCEDURE — 37191 INS ENDOVAS VENA CAVA FILTR: CPT

## 2025-01-30 RX ORDER — MORPHINE SULFATE 60 MG/1
5 TABLET, FILM COATED, EXTENDED RELEASE ORAL EVERY 4 HOURS
Refills: 0 | Status: DISCONTINUED | OUTPATIENT
Start: 2025-01-30 | End: 2025-02-04

## 2025-01-30 RX ORDER — MORPHINE SULFATE 60 MG/1
2 TABLET, FILM COATED, EXTENDED RELEASE ORAL ONCE
Refills: 0 | Status: DISCONTINUED | OUTPATIENT
Start: 2025-01-30 | End: 2025-01-30

## 2025-01-30 RX ORDER — ACETAMINOPHEN 160 MG/5ML
1000 SUSPENSION ORAL ONCE
Refills: 0 | Status: COMPLETED | OUTPATIENT
Start: 2025-01-30 | End: 2025-01-30

## 2025-01-30 RX ADMIN — Medication 1 DROP(S): at 21:47

## 2025-01-30 RX ADMIN — ACETAMINOPHEN 1000 MILLIGRAM(S): 160 SUSPENSION ORAL at 21:45

## 2025-01-30 RX ADMIN — ACETAMINOPHEN 400 MILLIGRAM(S): 160 SUSPENSION ORAL at 06:18

## 2025-01-30 RX ADMIN — GABAPENTIN 300 MILLIGRAM(S): 800 TABLET ORAL at 09:03

## 2025-01-30 RX ADMIN — FAMOTIDINE 20 MILLIGRAM(S): 10 INJECTION INTRAVENOUS at 21:46

## 2025-01-30 RX ADMIN — MORPHINE SULFATE 2 MILLIGRAM(S): 60 TABLET, FILM COATED, EXTENDED RELEASE ORAL at 17:52

## 2025-01-30 RX ADMIN — ENOXAPARIN SODIUM 50 MILLIGRAM(S): 100 INJECTION SUBCUTANEOUS at 19:31

## 2025-01-30 RX ADMIN — Medication 100 MILLIGRAM(S): at 21:46

## 2025-01-30 RX ADMIN — Medication 1 DROP(S): at 06:18

## 2025-01-30 RX ADMIN — GABAPENTIN 200 MILLIGRAM(S): 800 TABLET ORAL at 20:04

## 2025-01-30 RX ADMIN — MORPHINE SULFATE 5 MILLIGRAM(S): 60 TABLET, FILM COATED, EXTENDED RELEASE ORAL at 02:22

## 2025-01-30 RX ADMIN — ACETAMINOPHEN 400 MILLIGRAM(S): 160 SUSPENSION ORAL at 14:15

## 2025-01-30 RX ADMIN — CEFTRIAXONE 100 MILLIGRAM(S): 250 INJECTION, POWDER, FOR SOLUTION INTRAMUSCULAR; INTRAVENOUS at 19:31

## 2025-01-30 RX ADMIN — Medication 100 MILLIGRAM(S): at 20:04

## 2025-01-30 RX ADMIN — Medication 1 DROP(S): at 13:17

## 2025-01-30 RX ADMIN — PANTOPRAZOLE 40 MILLIGRAM(S): 20 TABLET, DELAYED RELEASE ORAL at 06:21

## 2025-01-30 RX ADMIN — Medication 100 MILLIGRAM(S): at 06:22

## 2025-01-30 RX ADMIN — ACETAMINOPHEN 1000 MILLIGRAM(S): 160 SUSPENSION ORAL at 14:32

## 2025-01-30 NOTE — PRE PROCEDURE NOTE - PRE PROCEDURE EVALUATION
Pre Procedural Sedation Evaluation    Proceduralist: Dr. Chapman    History and physical reviewed  Medications reviewed  Labs reviewed  EKG reviewed    Anticoagulation: Lovenox  Urine pregnancy: N/A  Dentures: None  Last PO intake: 1/30 @9AM    Pre-Procedure Physical Assessment  Mental status: Alert and oriented x 3  Level of consciousness: 1  Cardiac assessment: Stable  Pulmonary assessment: Stable  Obstructive sleep apnea: No  Aspiration risk: No  Mallampati score: 2  ASA Classification: 2  Prior Sedative or Anesthesia Experience: No complications  Informed consent by responsible adult: Yes  Based on today's assessment, anesthesia consult requested: No
Interventional Radiology    HPI: 74F with rectal cancer with bony and lung metastases s/p RT currently on chemotherapy, 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 IR consulted for chest tube for pleural effusion.    Allergies: sulfa drugs (Pruritus)    Medications (Abx/Cardiac/Anticoagulation/Blood Products)      Data:    T(C): 37  HR: 105  BP: 117/68  RR: 18  SpO2: 94%    Exam  General: No acute distress  Chest: Non labored breathing  Abdomen: Non-distended  Extremities: No swelling, warm    -WBC 6.15 / HgB 9.2 / Hct 31.8 / Plt 211  -Na 134 / Cl 97 / BUN 13 / Glucose 91  -K 4.2 / CO2 26 / Cr 0.59  -ALT -- / Alk Phos -- / T.Bili --  -INR1.04    Imaging:   - relevant imaging reviewed     Plan:     -- Plan for image guided right chest tube placement   -- Relevant imaging and labs were reviewed.   -- Risks, benefits, and alternatives were explained to the patient and informed consent was obtained.

## 2025-01-30 NOTE — PROGRESS NOTE ADULT - SUBJECTIVE AND OBJECTIVE BOX
Name of Patient : TAHIR PENA  MRN: [id number]  Date of visit: 01-30-25      Subjective: Patient seen and examined. No new events except as noted.   doing okay  plan for IVC filter today     REVIEW OF SYSTEMS:    CONSTITUTIONAL: No weakness, fevers or chills  EYES/ENT: No visual changes;  No vertigo or throat pain   NECK: No pain or stiffness  RESPIRATORY: No cough, wheezing, hemoptysis; No shortness of breath  CARDIOVASCULAR: No chest pain or palpitations  GASTROINTESTINAL: No abdominal or epigastric pain. No nausea, vomiting, or hematemesis; No diarrhea or constipation. No melena or hematochezia.  GENITOURINARY: No dysuria, frequency or hematuria  NEUROLOGICAL: No numbness or weakness  SKIN: No itching, burning, rashes, or lesions   All other review of systems is negative unless indicated above.    MEDICATIONS:  MEDICATIONS  (STANDING):  artificial tears (preservative free) Ophthalmic Solution 1 Drop(s) Both EYES three times a day  benzonatate 100 milliGRAM(s) Oral three times a day  enoxaparin Injectable 50 milliGRAM(s) SubCutaneous every 12 hours  famotidine    Tablet 20 milliGRAM(s) Oral at bedtime  gabapentin 300 milliGRAM(s) Oral <User Schedule>  gabapentin 200 milliGRAM(s) Oral <User Schedule>  lidocaine   4% Patch 1 Patch Transdermal daily  pantoprazole    Tablet 40 milliGRAM(s) Oral before breakfast  polyethylene glycol 3350 17 Gram(s) Oral daily  senna 2 Tablet(s) Oral at bedtime  sodium chloride 0.9% Solution for Pleural Effusion 30 milliLiter(s) IntraPleural. every 12 hours      PHYSICAL EXAM:  T(C): 36.3 (01-30-25 @ 19:35), Max: 36.9 (01-30-25 @ 14:03)  HR: 92 (01-30-25 @ 19:35) (90 - 103)  BP: 110/88 (01-30-25 @ 19:35) (106/61 - 132/69)  RR: 18 (01-30-25 @ 19:35) (18 - 25)  SpO2: 96% (01-30-25 @ 19:35) (96% - 100%)  Wt(kg): --  I&O's Summary    Height (cm): 165.1 (01-30 @ 15:44)  Weight (kg): 52.2 (01-30 @ 15:44)  BMI (kg/m2): 19.2 (01-30 @ 15:44)  BSA (m2): 1.56 (01-30 @ 15:44)    Appearance: Normal	  HEENT:  PERRLA   Lymphatic: No lymphadenopathy   Cardiovascular: Normal S1 S2, no JVD  Respiratory: normal effort , clear  Gastrointestinal:  Soft, Non-tender  Skin: No rashes,  warm to touch  Psychiatry:  Mood & affect appropriate  Musculuskeletal: No edema    recent labs, Imaging and EKGs personally reviewed   CODE status discussed with the patient in detail                          8.1    6.12  )-----------( 184      ( 30 Jan 2025 05:20 )             27.7               01-30    138  |  104  |  19  ----------------------------<  94  4.1   |  22  |  0.60    Ca    9.5      30 Jan 2025 05:20  Phos  3.5     01-30  Mg     1.90     01-30    TPro  6.0  /  Alb  2.6[L]  /  TBili  <0.2  /  DBili  <0.2  /  AST  17  /  ALT  19  /  AlkPhos  378[H]  01-29    PT/INR - ( 30 Jan 2025 05:20 )   PT: 12.6 sec;   INR: 1.06 ratio         PTT - ( 30 Jan 2025 05:20 )  PTT:32.4 sec                   Urinalysis Basic - ( 30 Jan 2025 05:20 )    Color: x / Appearance: x / SG: x / pH: x  Gluc: 94 mg/dL / Ketone: x  / Bili: x / Urobili: x   Blood: x / Protein: x / Nitrite: x   Leuk Esterase: x / RBC: x / WBC x   Sq Epi: x / Non Sq Epi: x / Bacteria: x

## 2025-01-30 NOTE — PROGRESS NOTE ADULT - ASSESSMENT
Ms. Delarosa is a 74 year old woman with metastatic rectal cancer on chemotherapy who has had prior deep venous thrombosis treated with low molecular weight heparin, now admitted for a right-sided pleural effusion drained by thoracic surgery. She now has extensive right above- and below-knee deep venous thrombosis for which vascular surgery is consulted.     Recommendations  - Recommend anticoagulation if patient will accept it. Defer choice of anticoagulant to primary team.   - Plan for inferior vena cava filter placement today with Dr. Rajinder Chapman.     Case discussed with vascular surgery fellow Dr. Gong on behalf of Dr. Chapman.     Mina Gonzalez, PGY-2  Vascular Surgery ([id number])

## 2025-01-30 NOTE — CHART NOTE - NSCHARTNOTEFT_GEN_A_CORE
TAHIR PENA 74y Female s/p IVC filter placement via left femoral assesses. Patient denies pain, numbness, tingling, CP, SOB.     T(C): 36.3 (01-30-25 @ 19:35), Max: 36.9 (01-30-25 @ 14:03)  HR: 92 (01-30-25 @ 19:35) (90 - 103)  BP: 110/88 (01-30-25 @ 19:35) (106/61 - 132/69)  RR: 18 (01-30-25 @ 19:35) (18 - 25)  SpO2: 96% (01-30-25 @ 19:35) (96% - 100%)  VSS.     Site is stable with no hematoma, active bleed or swelling.   Dressing is clean/dry/intact.   DP pulse is palpable.   no femoral bruit    Will continue to monitor patient closely.     Annalee Chua PA-C  Department of Medicine, [id number]

## 2025-01-30 NOTE — PROGRESS NOTE ADULT - SUBJECTIVE AND OBJECTIVE BOX
Vascular Surgery Progress Note    Subjective and Interval  No acute events overnight. Denies fevers, chills, or shortness of breath. Endorses unchanged, chronic chest pain.   ___________________________________________________  Vital Signs  T(C): 36.7 (09:45), Max: 36.7 (17:30)  HR: 100 (09:45) (93 - 106)  BP: 111/49 (09:45) (100/54 - 126/69)  RR: 18 (09:45) (16 - 18)  SpO2: 98% (09:45) (95% - 98%)    Physical Exam  General: Resting comfortably in bed in no acute distress.   Neurologic: Alert, oriented, and appropriately responds to questions.   Psychiatric: Euthymic mood, calm affect, linear thought process, appropriate thought content.   Cardiac: Warm and well-perfused.   Respiratory: Equal chest wall expansion bilaterally with no accessory muscle use, no tachypnea, and no grossly increased work of breathing on room air.   Extremities: Bilateral lower extremities warm with motor and sensory function intact. Right lower extremity with 2+ pitting edema.   ___________________________________________________  Laboratory studies  CBC Basic (01-30 @ 05:20)  WBC: 6.12 Hgb: 8.1 Hct: 27.7 Plt: 184    Metabolic Panel (01-30 @ 05:20)  138  |  104  |  19  ----------------------------<  94  4.1   |  22  |  0.60  Ca: 9.5/Phos: 3.5/Magnesium: 1.90    Coagulation Studies (01-30 @ 05:20)  PT/INR: 12.6/1.06, aPTT: 32.4    Urinalysis (01-28 @ 14:55)  Physical: Color Dark Yellow, Appearance Turbid, Mucous x, Gross blood Large  Analytic: SG 1.046, pH 5.5  Cells: RBC 30, WBC 37  UTI markers: Bacteria Many, Leukocyte esterase Trace, Nitrites Positive  Chemical: Glucose x, Ketones Negative, Protein 300, Urobilinogen 1.0, Bilirubin Negative

## 2025-01-30 NOTE — CHART NOTE - NSCHARTNOTEFT_GEN_A_CORE
Post Operative Note  Patient: TAHIR PENA 74y (1950) Female   MRN: [id number]  Location: [place] [address] A  Visit: 01-18-25 Inpatient  Patient Seen and Examined: 01-30-25 @ 20:55    Procedure: S/P IVC filter placement    Subjective: Patient seen and examined post operatively. Complains of a little discomfort in her abdomen from where she felt pain during the procedure. Otherwise, she is doing well. Access site is soft w/ no evidence of hematoma. Tolerating diet. No N/V.      Objective:  Vitals: T(F): 97.3 (01-30-25 @ 19:35), Max: 98.5 (01-30-25 @ 14:03)  HR: 92 (01-30-25 @ 19:35)  BP: 110/88 (01-30-25 @ 19:35) (100/54 - 132/69)  RR: 18 (01-30-25 @ 19:35)  SpO2: 96% (01-30-25 @ 19:35)    Physical Examination:  General: NAD, resting comfortably in bed  HEENT: Normocephalic atraumatic  Respiratory: Nonlabored respirations, normal CW expansion.  Cardio: S1S2, regular rate and rhythm.  Abdomen: soft, nontender, nondistended.  Vascular: Right leg remains swollen from mid thigh to foot. DP/TP palpable. Extremities warm and well perfused    Imaging:  No post-op imaging studies    Assessment:  74y Female patient S/P IVC filter placement    Plan:  - Pain control PRN  - Diet: regular  - Activity: as tolerated  - tLovenox  - excellent care per primary team

## 2025-01-30 NOTE — PROGRESS NOTE ADULT - ASSESSMENT
75 y/o F with history of recurrent rectal cancer admitted for dyspnea with right pleural effusion.     1. Recurrent metastatic rectal cancer  ·	[place] [facility] [place]   ·	Recent progression on FOLFOX  ·	Planning to switch to lonsurf and bevacizumab  ·	s/p palliative radiation therapy to the right rib area as well as spine.    ·	No disease directed therapy as inpatient  ·	Patient to follow with [facility] [place] upon [place]  ·	still complains of uncontrolled pain,  palliative care to optimize pain management     2.  Dyspnea  ·	Patient has loculated right pleural effusion  ·	Appx 360cc clear yellow fluid aspirated. Drain placed to Pleur Evac.   ·	MIST dornase and alteplase treatments, completed protocol x6 treatments.   ·	Repeat CT with trace effusion. Persistent complete RML atelectasis. 02 sat 96% room air  ·	Await pleural studies and cytology  ·	Appreciate CT surgery recs  ·	In setting of new DVT would consider obtaining CTA to assess for PE  ·	PTC removed  ·	Pt is not a candidate for any further thoracic surgical intervention  ·	Pt can f/u with DR Sultana as outpt in office 2-3 weeks after discharge to     3. Acute b/l LE DVT  ·	Doppler with Acute deep venous thrombosis: above and below the knee. Extensive DVT throughout the RIGHT lower extremity. DVT in the LEFT femoral vein.  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

## 2025-01-30 NOTE — PROGRESS NOTE ADULT - SUBJECTIVE AND OBJECTIVE BOX
Patient is a 74y old  Female who presents with a chief complaint of pleural effusion (29 Jan 2025 11:33)  no new events, pain control adequate, plan for IVC filter today with vascular      MEDICATIONS  (STANDING):  artificial tears (preservative free) Ophthalmic Solution 1 Drop(s) Both EYES three times a day  benzonatate 100 milliGRAM(s) Oral three times a day  cefTRIAXone   IVPB 1000 milliGRAM(s) IV Intermittent every 24 hours  enoxaparin Injectable 50 milliGRAM(s) SubCutaneous every 12 hours  famotidine    Tablet 20 milliGRAM(s) Oral at bedtime  gabapentin 300 milliGRAM(s) Oral <User Schedule>  gabapentin 200 milliGRAM(s) Oral <User Schedule>  lidocaine   4% Patch 1 Patch Transdermal daily  pantoprazole    Tablet 40 milliGRAM(s) Oral before breakfast  polyethylene glycol 3350 17 Gram(s) Oral daily  senna 2 Tablet(s) Oral at bedtime  sodium chloride 0.9% Solution for Pleural Effusion 30 milliLiter(s) IntraPleural. every 12 hours    MEDICATIONS  (PRN):  acetaminophen     Tablet .. 1000 milliGRAM(s) Oral every 8 hours PRN Mild Pain (1 - 3)  aluminum hydroxide/magnesium hydroxide/simethicone Suspension 30 milliLiter(s) Oral every 4 hours PRN Dyspepsia  guaiFENesin  milliGRAM(s) Oral every 12 hours PRN Cough  melatonin 3 milliGRAM(s) Oral at bedtime PRN Insomnia  morphine   Solution 5 milliGRAM(s) Oral every 4 hours PRN Moderate-severe pain  ondansetron   Disintegrating Tablet 4 milliGRAM(s) Oral every 6 hours PRN Nausea and/or Vomiting  ondansetron Injectable 4 milliGRAM(s) IV Push every 8 hours PRN Nausea and/or Vomiting  sodium chloride 0.65% Nasal 1 Spray(s) Both Nostrils four times a day PRN Nasal Congestion        Vital Signs Last 24 Hrs  T(C): 36.4 (29 Jan 2025 22:00), Max: 36.9 (29 Jan 2025 09:35)  T(F): 97.6 (29 Jan 2025 22:00), Max: 98.5 (29 Jan 2025 09:35)  HR: 102 (29 Jan 2025 22:00) (96 - 106)  BP: 100/54 (29 Jan 2025 22:00) (100/54 - 126/69)  BP(mean): --  RR: 16 (29 Jan 2025 22:00) (16 - 18)  SpO2: 95% (29 Jan 2025 22:00) (95% - 98%)    Parameters below as of 29 Jan 2025 22:00  Patient On (Oxygen Delivery Method): room air        PE  NAD  Awake, alert  Anicteric, MMM  RRR  CTAB  Abd soft, NT, ND  No c/c/e  No rash grossly                            8.1    6.12  )-----------( 184      ( 30 Jan 2025 05:20 )             27.7       01-29    137  |  104  |  20  ----------------------------<  90  4.1   |  23  |  0.65    Ca    9.4      29 Jan 2025 05:46  Phos  3.2     01-29  Mg     2.00     01-29    TPro  6.0  /  Alb  2.6[L]  /  TBili  <0.2  /  DBili  <0.2  /  AST  17  /  ALT  19  /  AlkPhos  378[H]  01-29

## 2025-01-31 LAB
-  AMOXICILLIN/CLAVULANIC ACID: SIGNIFICANT CHANGE UP
-  AMPICILLIN/SULBACTAM: SIGNIFICANT CHANGE UP
-  AMPICILLIN: SIGNIFICANT CHANGE UP
-  AZTREONAM: SIGNIFICANT CHANGE UP
-  CEFAZOLIN: SIGNIFICANT CHANGE UP
-  CEFEPIME: SIGNIFICANT CHANGE UP
-  CEFOXITIN: SIGNIFICANT CHANGE UP
-  CEFTRIAXONE: SIGNIFICANT CHANGE UP
-  CEFUROXIME: SIGNIFICANT CHANGE UP
-  CIPROFLOXACIN: SIGNIFICANT CHANGE UP
-  ERTAPENEM: SIGNIFICANT CHANGE UP
-  GENTAMICIN: SIGNIFICANT CHANGE UP
-  IMIPENEM: SIGNIFICANT CHANGE UP
-  LEVOFLOXACIN: SIGNIFICANT CHANGE UP
-  MEROPENEM: SIGNIFICANT CHANGE UP
-  NITROFURANTOIN: SIGNIFICANT CHANGE UP
-  PIPERACILLIN/TAZOBACTAM: SIGNIFICANT CHANGE UP
-  TOBRAMYCIN: SIGNIFICANT CHANGE UP
-  TRIMETHOPRIM/SULFAMETHOXAZOLE: SIGNIFICANT CHANGE UP
ANION GAP SERPL CALC-SCNC: 11 MMOL/L — SIGNIFICANT CHANGE UP (ref 7–14)
BASOPHILS # BLD AUTO: 0.02 K/UL — SIGNIFICANT CHANGE UP (ref 0–0.2)
BASOPHILS NFR BLD AUTO: 0.4 % — SIGNIFICANT CHANGE UP (ref 0–2)
BUN SERPL-MCNC: 16 MG/DL — SIGNIFICANT CHANGE UP (ref 7–23)
CALCIUM SERPL-MCNC: 9.5 MG/DL — SIGNIFICANT CHANGE UP (ref 8.4–10.5)
CHLORIDE SERPL-SCNC: 103 MMOL/L — SIGNIFICANT CHANGE UP (ref 98–107)
CO2 SERPL-SCNC: 22 MMOL/L — SIGNIFICANT CHANGE UP (ref 22–31)
CREAT SERPL-MCNC: 0.56 MG/DL — SIGNIFICANT CHANGE UP (ref 0.5–1.3)
CULTURE RESULTS: ABNORMAL
EGFR: 97 ML/MIN/1.73M2 — SIGNIFICANT CHANGE UP
EOSINOPHIL # BLD AUTO: 0.05 K/UL — SIGNIFICANT CHANGE UP (ref 0–0.5)
EOSINOPHIL NFR BLD AUTO: 1.1 % — SIGNIFICANT CHANGE UP (ref 0–6)
GLUCOSE SERPL-MCNC: 89 MG/DL — SIGNIFICANT CHANGE UP (ref 70–99)
HCT VFR BLD CALC: 26.2 % — LOW (ref 34.5–45)
HGB BLD-MCNC: 8.1 G/DL — LOW (ref 11.5–15.5)
IANC: 3.92 K/UL — SIGNIFICANT CHANGE UP (ref 1.8–7.4)
IMM GRANULOCYTES NFR BLD AUTO: 0.7 % — SIGNIFICANT CHANGE UP (ref 0–0.9)
LYMPHOCYTES # BLD AUTO: 0.21 K/UL — LOW (ref 1–3.3)
LYMPHOCYTES # BLD AUTO: 4.6 % — LOW (ref 13–44)
MAGNESIUM SERPL-MCNC: 2 MG/DL — SIGNIFICANT CHANGE UP (ref 1.6–2.6)
MCHC RBC-ENTMCNC: 29 PG — SIGNIFICANT CHANGE UP (ref 27–34)
MCHC RBC-ENTMCNC: 30.9 G/DL — LOW (ref 32–36)
MCV RBC AUTO: 93.9 FL — SIGNIFICANT CHANGE UP (ref 80–100)
METHOD TYPE: SIGNIFICANT CHANGE UP
MONOCYTES # BLD AUTO: 0.37 K/UL — SIGNIFICANT CHANGE UP (ref 0–0.9)
MONOCYTES NFR BLD AUTO: 8 % — SIGNIFICANT CHANGE UP (ref 2–14)
NEUTROPHILS # BLD AUTO: 3.92 K/UL — SIGNIFICANT CHANGE UP (ref 1.8–7.4)
NEUTROPHILS NFR BLD AUTO: 85.2 % — HIGH (ref 43–77)
NRBC # BLD AUTO: 0 K/UL — SIGNIFICANT CHANGE UP (ref 0–0)
NRBC # BLD: 0 /100 WBCS — SIGNIFICANT CHANGE UP (ref 0–0)
NRBC # FLD: 0 K/UL — SIGNIFICANT CHANGE UP (ref 0–0)
NRBC BLD-RTO: 0 /100 WBCS — SIGNIFICANT CHANGE UP (ref 0–0)
ORGANISM # SPEC MICROSCOPIC CNT: ABNORMAL
ORGANISM # SPEC MICROSCOPIC CNT: ABNORMAL
PHOSPHATE SERPL-MCNC: 3.3 MG/DL — SIGNIFICANT CHANGE UP (ref 2.5–4.5)
PLATELET # BLD AUTO: 187 K/UL — SIGNIFICANT CHANGE UP (ref 150–400)
POTASSIUM SERPL-MCNC: 4.1 MMOL/L — SIGNIFICANT CHANGE UP (ref 3.5–5.3)
POTASSIUM SERPL-SCNC: 4.1 MMOL/L — SIGNIFICANT CHANGE UP (ref 3.5–5.3)
RBC # BLD: 2.79 M/UL — LOW (ref 3.8–5.2)
RBC # FLD: 17.2 % — HIGH (ref 10.3–14.5)
SODIUM SERPL-SCNC: 136 MMOL/L — SIGNIFICANT CHANGE UP (ref 135–145)
SPECIMEN SOURCE: SIGNIFICANT CHANGE UP
WBC # BLD: 4.6 K/UL — SIGNIFICANT CHANGE UP (ref 3.8–10.5)
WBC # FLD AUTO: 4.6 K/UL — SIGNIFICANT CHANGE UP (ref 3.8–10.5)

## 2025-01-31 RX ORDER — ACETAMINOPHEN 160 MG/5ML
1000 SUSPENSION ORAL ONCE
Refills: 0 | Status: COMPLETED | OUTPATIENT
Start: 2025-01-31 | End: 2025-01-31

## 2025-01-31 RX ORDER — ENOXAPARIN SODIUM 100 MG/ML
60 INJECTION SUBCUTANEOUS EVERY 24 HOURS
Refills: 0 | Status: DISCONTINUED | OUTPATIENT
Start: 2025-01-31 | End: 2025-02-06

## 2025-01-31 RX ADMIN — Medication 1 DROP(S): at 06:21

## 2025-01-31 RX ADMIN — MORPHINE SULFATE 5 MILLIGRAM(S): 60 TABLET, FILM COATED, EXTENDED RELEASE ORAL at 11:05

## 2025-01-31 RX ADMIN — Medication 100 MILLIGRAM(S): at 19:48

## 2025-01-31 RX ADMIN — ACETAMINOPHEN 1000 MILLIGRAM(S): 160 SUSPENSION ORAL at 12:30

## 2025-01-31 RX ADMIN — ENOXAPARIN SODIUM 50 MILLIGRAM(S): 100 INJECTION SUBCUTANEOUS at 06:25

## 2025-01-31 RX ADMIN — GABAPENTIN 300 MILLIGRAM(S): 800 TABLET ORAL at 19:49

## 2025-01-31 RX ADMIN — ACETAMINOPHEN 1000 MILLIGRAM(S): 160 SUSPENSION ORAL at 01:31

## 2025-01-31 RX ADMIN — MORPHINE SULFATE 5 MILLIGRAM(S): 60 TABLET, FILM COATED, EXTENDED RELEASE ORAL at 18:12

## 2025-01-31 RX ADMIN — MORPHINE SULFATE 5 MILLIGRAM(S): 60 TABLET, FILM COATED, EXTENDED RELEASE ORAL at 17:05

## 2025-01-31 RX ADMIN — GABAPENTIN 300 MILLIGRAM(S): 800 TABLET ORAL at 09:08

## 2025-01-31 RX ADMIN — Medication 100 MILLIGRAM(S): at 06:22

## 2025-01-31 RX ADMIN — MORPHINE SULFATE 5 MILLIGRAM(S): 60 TABLET, FILM COATED, EXTENDED RELEASE ORAL at 21:07

## 2025-01-31 RX ADMIN — PANTOPRAZOLE 40 MILLIGRAM(S): 20 TABLET, DELAYED RELEASE ORAL at 06:22

## 2025-01-31 RX ADMIN — ACETAMINOPHEN 400 MILLIGRAM(S): 160 SUSPENSION ORAL at 05:08

## 2025-01-31 RX ADMIN — Medication 100 MILLIGRAM(S): at 15:00

## 2025-01-31 RX ADMIN — MORPHINE SULFATE 5 MILLIGRAM(S): 60 TABLET, FILM COATED, EXTENDED RELEASE ORAL at 10:07

## 2025-01-31 RX ADMIN — Medication 1 DROP(S): at 15:00

## 2025-01-31 RX ADMIN — GABAPENTIN 200 MILLIGRAM(S): 800 TABLET ORAL at 15:00

## 2025-01-31 NOTE — PROGRESS NOTE ADULT - ASSESSMENT
73 y/o F with history of recurrent rectal cancer admitted for dyspnea with right pleural effusion.     1. Recurrent metastatic rectal cancer  ·	Patient follows with [facility]   ·	Recent progression on FOLFOX  ·	Planning to switch to lonsurf and bevacizumab  ·	s/p palliative radiation therapy to the right rib area as well as spine.    ·	No disease directed therapy as inpatient  ·	Patient to follow with [facility] upon discharge  ·	Pain better controlled but still with intermittent pains, palliative care team following to optimize pain control     2.  Dyspnea 2/2 pleural effusion  ·	CT surgery following, s/p MIST x 6  ·	Repeat CT with trace effusion. Persistent complete RML atelectasis. 02 sat 96% room air  ·	Await pleural studies and cytology  ·	Appreciate CT surgery recs  ·	In setting of new DVT would consider obtaining CTA to assess for PE  ·	PTC removed  ·	Pt is not a candidate for any further thoracic surgical intervention  ·	Pt can f/u with DR Sultana as outpt in office 2-3 weeks after discharge to     3. Acute b/l LE DVT  ·	Doppler with Acute deep venous thrombosis: above and below the knee. Extensive DVT throughout the RIGHT lower extremity. DVT in the LEFT femoral vein.  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     1. Recurrent metastatic rectal cancer  ·	Patient [place] [facility] [place]   ·	Recent progression on FOLFOX  ·	Planning to switch to lonsurf and bevacizumab  ·	s/p palliative radiation therapy to the right rib area as well as spine.    ·	No disease directed therapy as inpatient  ·	Patient to follow with [facility] [place] upon [place]  ·	Pain better controlled but still with intermittent pains, palliative care team following to optimize pain control     2.  Dyspnea 2/2 pleural effusion  ·	CT surgery following, s/p chest tube now removed, s/p MIST x 6  ·	    3. Acute b/l LE DVT  ·	Doppler with acute deep venous thrombosis: above and below the knee. Extensive DVT throughout the RIGHT lower extremity. DVT in the LEFT femoral vein.  ·	Vascular following, s/p IVC filter placement 01/30  ·	Cont with therapeutic anticoagulation     Will cont to follow    Shreya Aguiar PA-C  Hematology/Oncology  [facility]  [phone] (office)  [phone] (alt office)  Evenings and weekends please call MD on call or office   75 y/o F with history of recurrent rectal cancer admitted for dyspnea with right pleural effusion.     1. Recurrent metastatic rectal cancer  ·	[place] [facility] [place]   ·	Recent progression on FOLFOX  ·	Planning to switch to lonsurf and bevacizumab  ·	s/p palliative radiation therapy to the right rib area as well as spine.    ·	No disease directed therapy as inpatient  ·	Patient to follow with [facility] [place]  ·	Pain better controlled but still with intermittent pains, palliative care team following to optimize pain control     2.  Dyspnea 2/2 pleural effusion  ·	CT surgery following, s/p chest tube now removed, s/p MIST x 6    3. Acute b/l LE DVT  ·	Doppler with acute deep venous thrombosis: above and below the knee. Extensive DVT throughout the RIGHT lower extremity. DVT in the LEFT femoral vein.  ·	Vascular following, s/p IVC filter placement 01/30  ·	Cont with therapeutic anticoagulation- Lovenox 1.5mg/kg daily    Will cont to follow    Shreya Aguiar PA-C  Hematology/Oncology  [facility]  [phone] (office)  [phone] (alt office)  Evenings and weekends please call MD on call or office

## 2025-01-31 NOTE — PROGRESS NOTE ADULT - ASSESSMENT
Ms. Delarosa is a 74 year old woman with metastatic rectal cancer on chemotherapy who has had prior deep venous thrombosis treated with low molecular weight heparin, now admitted for a right-sided pleural effusion drained by thoracic surgery. She now has extensive right above- and below-knee deep venous thrombosis for which vascular surgery placed an inferior vena cava filter 1/30/2025 (Dr. Chapman).     Recommendations  - Recommend anticoagulation if patient will accept it. Defer choice of anticoagulant to primary team.   - No evidence of post-surgical complications following IVC filter placement. No further workup or intervention needed from a vascular surgical standpoint. Please page with any further questions or concerns, or if patient's clinical status changes.     Case discussed with vascular surgery fellow Dr. Gong.    Mina Gonzalez, PGY-2  Vascular Surgery ([id number])

## 2025-01-31 NOTE — PROGRESS NOTE ADULT - SUBJECTIVE AND OBJECTIVE BOX
General Surgery Progress Note    Subjective and Interval  Post-operative day one following IVC filter placement. Denies new fevers or chills. Endorses unchanged shortness of breath and chest pain which she localizes to the right side of her back more than her chest.   ___________________________________________________  Vital Signs  T(C): 36.7 (09:29), Max: 36.9 (14:03)  HR: 102 (09:29) (90 - 103)  BP: 113/55 (09:29) (110/88 - 132/69)  RR: 16 (09:29) (16 - 25)  SpO2: 99% (09:29) (96% - 100%)    Physical Exam  General: Resting comfortably in bed in no acute distress.   Neurologic: Alert, oriented, and appropriately responds to questions.   Psychiatric: Euthymic mood, calm affect, linear thought process, appropriate thought content.   Cardiac: Warm and well-perfused.   Respiratory: Equal chest wall expansion bilaterally with no accessory muscle use, no tachypnea, and no grossly increased work of breathing on room air.   Extremities: Bilateral lower extremities warm with motor and sensory function intact. Left groin access site dressing is clean, dry and intact, groin soft, palpable femoral pulse, no appreciable hematoma.   ___________________________________________________  Laboratory studies  CBC Basic (01-31 @ 05:55)  WBC: 4.60 Hgb: 8.1 Hct: 26.2 Plt: 187    Metabolic Panel (01-31 @ 05:55)  136  |  103  |  16  ----------------------------<  89  4.1   |  22  |  0.56  Ca: 9.5/Phos: 3.3/Magnesium: 2.00

## 2025-01-31 NOTE — PROGRESS NOTE ADULT - SUBJECTIVE AND OBJECTIVE BOX
Name of Patient : TAHIR PENA  MRN: [id number]  Date of visit: 01-31-25       Subjective: Patient seen and examined. No new events except as noted.   doing okay   S/P IVC filter placement  placed on lovenox daily     REVIEW OF SYSTEMS:    CONSTITUTIONAL: No weakness, fevers or chills  EYES/ENT: No visual changes;  No vertigo or throat pain   NECK: No pain or stiffness  RESPIRATORY: No cough, wheezing, hemoptysis; No shortness of breath  CARDIOVASCULAR: No chest pain or palpitations  GASTROINTESTINAL: No abdominal or epigastric pain. No nausea, vomiting, or hematemesis; No diarrhea or constipation. No melena or hematochezia.  GENITOURINARY: No dysuria, frequency or hematuria  NEUROLOGICAL: No numbness or weakness  SKIN: No itching, burning, rashes, or lesions   All other review of systems is negative unless indicated above.    MEDICATIONS:  MEDICATIONS  (STANDING):  artificial tears (preservative free) Ophthalmic Solution 1 Drop(s) Both EYES three times a day  benzonatate 100 milliGRAM(s) Oral three times a day  enoxaparin Injectable 60 milliGRAM(s) SubCutaneous every 24 hours  famotidine    Tablet 20 milliGRAM(s) Oral at bedtime  gabapentin 300 milliGRAM(s) Oral <User Schedule>  gabapentin 200 milliGRAM(s) Oral <User Schedule>  lidocaine   4% Patch 1 Patch Transdermal daily  pantoprazole    Tablet 40 milliGRAM(s) Oral before breakfast  polyethylene glycol 3350 17 Gram(s) Oral daily  senna 2 Tablet(s) Oral at bedtime  sodium chloride 0.9% Solution for Pleural Effusion 30 milliLiter(s) IntraPleural. every 12 hours      PHYSICAL EXAM:  T(C): 36.7 (01-31-25 @ 09:29), Max: 36.7 (01-31-25 @ 09:29)  HR: 102 (01-31-25 @ 09:29) (90 - 102)  BP: 113/55 (01-31-25 @ 09:29) (110/88 - 132/69)  RR: 16 (01-31-25 @ 09:29) (16 - 25)  SpO2: 99% (01-31-25 @ 09:29) (96% - 100%)  Wt(kg): --  I&O's Summary        Appearance: Normal	  HEENT:  PERRLA   Lymphatic: No lymphadenopathy   Cardiovascular: Normal S1 S2, no JVD  Respiratory: normal effort , clear  Gastrointestinal:  Soft, Non-tender  Skin: No rashes,  warm to touch  Psychiatry:  Mood & affect appropriate  Musculuskeletal: No edema    recent labs, Imaging and EKGs personally reviewed   CODE status discussed with the patient in detail                          8.1    4.60  )-----------( 187      ( 31 Jan 2025 05:55 )             26.2               01-31    136  |  103  |  16  ----------------------------<  89  4.1   |  22  |  0.56    Ca    9.5      31 Jan 2025 05:55  Phos  3.3     01-31  Mg     2.00     01-31      PT/INR - ( 30 Jan 2025 05:20 )   PT: 12.6 sec;   INR: 1.06 ratio         PTT - ( 30 Jan 2025 05:20 )  PTT:32.4 sec                   Urinalysis Basic - ( 31 Jan 2025 05:55 )    Color: x / Appearance: x / SG: x / pH: x  Gluc: 89 mg/dL / Ketone: x  / Bili: x / Urobili: x   Blood: x / Protein: x / Nitrite: x   Leuk Esterase: x / RBC: x / WBC x   Sq Epi: x / Non Sq Epi: x / Bacteria: x

## 2025-01-31 NOTE — PROGRESS NOTE ADULT - SUBJECTIVE AND OBJECTIVE BOX
Patient is a 74y old  Female who presents with a chief complaint of pleural effusion (31 Jan 2025 12:44)    Patient seen this morning. s/p IVC filter placement yesterday. Complains of some generalized upper body pains     MEDICATIONS  (STANDING):  artificial tears (preservative free) Ophthalmic Solution 1 Drop(s) Both EYES three times a day  benzonatate 100 milliGRAM(s) Oral three times a day  enoxaparin Injectable 60 milliGRAM(s) SubCutaneous every 24 hours  famotidine    Tablet 20 milliGRAM(s) Oral at bedtime  gabapentin 300 milliGRAM(s) Oral <User Schedule>  gabapentin 200 milliGRAM(s) Oral <User Schedule>  lidocaine   4% Patch 1 Patch Transdermal daily  pantoprazole    Tablet 40 milliGRAM(s) Oral before breakfast  polyethylene glycol 3350 17 Gram(s) Oral daily  senna 2 Tablet(s) Oral at bedtime  sodium chloride 0.9% Solution for Pleural Effusion 30 milliLiter(s) IntraPleural. every 12 hours    MEDICATIONS  (PRN):  acetaminophen     Tablet .. 1000 milliGRAM(s) Oral every 8 hours PRN Mild Pain (1 - 3)  aluminum hydroxide/magnesium hydroxide/simethicone Suspension 30 milliLiter(s) Oral every 4 hours PRN Dyspepsia  guaiFENesin  milliGRAM(s) Oral every 12 hours PRN Cough  melatonin 3 milliGRAM(s) Oral at bedtime PRN Insomnia  morphine   Solution 5 milliGRAM(s) Oral every 4 hours PRN Moderate-severe pain  ondansetron   Disintegrating Tablet 4 milliGRAM(s) Oral every 6 hours PRN Nausea and/or Vomiting  ondansetron Injectable 4 milliGRAM(s) IV Push every 8 hours PRN Nausea and/or Vomiting  sodium chloride 0.65% Nasal 1 Spray(s) Both Nostrils four times a day PRN Nasal Congestion        Vital Signs Last 24 Hrs  T(C): 36.7 (31 Jan 2025 09:29), Max: 36.9 (30 Jan 2025 14:03)  T(F): 98.1 (31 Jan 2025 09:29), Max: 98.5 (30 Jan 2025 14:03)  HR: 102 (31 Jan 2025 09:29) (90 - 103)  BP: 113/55 (31 Jan 2025 09:29) (110/88 - 132/69)  BP(mean): --  RR: 16 (31 Jan 2025 09:29) (16 - 25)  SpO2: 99% (31 Jan 2025 09:29) (96% - 100%)    Parameters below as of 31 Jan 2025 09:29  Patient On (Oxygen Delivery Method): room air        PE  NAD  Awake, alert  Anicteric, MMM  No c/c/e  No rash grossly                            8.1    4.60  )-----------( 187      ( 31 Jan 2025 05:55 )             26.2       01-31    136  |  103  |  16  ----------------------------<  89  4.1   |  22  |  0.56    Ca    9.5      31 Jan 2025 05:55  Phos  3.3     01-31  Mg     2.00     01-31

## 2025-02-01 RX ORDER — DOCUSATE SODIUM 100 MG
100 CAPSULE ORAL DAILY
Refills: 0 | Status: DISCONTINUED | OUTPATIENT
Start: 2025-02-01 | End: 2025-02-06

## 2025-02-01 RX ORDER — ACETAMINOPHEN 160 MG/5ML
1000 SUSPENSION ORAL ONCE
Refills: 0 | Status: COMPLETED | OUTPATIENT
Start: 2025-02-01 | End: 2025-02-01

## 2025-02-01 RX ADMIN — PANTOPRAZOLE 40 MILLIGRAM(S): 20 TABLET, DELAYED RELEASE ORAL at 08:45

## 2025-02-01 RX ADMIN — Medication 2 TABLET(S): at 21:42

## 2025-02-01 RX ADMIN — GABAPENTIN 300 MILLIGRAM(S): 800 TABLET ORAL at 21:43

## 2025-02-01 RX ADMIN — GABAPENTIN 200 MILLIGRAM(S): 800 TABLET ORAL at 15:01

## 2025-02-01 RX ADMIN — MORPHINE SULFATE 5 MILLIGRAM(S): 60 TABLET, FILM COATED, EXTENDED RELEASE ORAL at 12:34

## 2025-02-01 RX ADMIN — MORPHINE SULFATE 5 MILLIGRAM(S): 60 TABLET, FILM COATED, EXTENDED RELEASE ORAL at 19:50

## 2025-02-01 RX ADMIN — Medication 1 DROP(S): at 21:43

## 2025-02-01 RX ADMIN — Medication 100 MILLIGRAM(S): at 15:02

## 2025-02-01 RX ADMIN — LIDOCAINE HYDROCHLORIDE 1 PATCH: 30 CREAM TOPICAL at 19:00

## 2025-02-01 RX ADMIN — ENOXAPARIN SODIUM 60 MILLIGRAM(S): 100 INJECTION SUBCUTANEOUS at 09:25

## 2025-02-01 RX ADMIN — Medication 100 MILLIGRAM(S): at 08:45

## 2025-02-01 RX ADMIN — Medication 100 MILLIGRAM(S): at 21:43

## 2025-02-01 RX ADMIN — GABAPENTIN 300 MILLIGRAM(S): 800 TABLET ORAL at 09:10

## 2025-02-01 RX ADMIN — MORPHINE SULFATE 5 MILLIGRAM(S): 60 TABLET, FILM COATED, EXTENDED RELEASE ORAL at 13:37

## 2025-02-01 RX ADMIN — LIDOCAINE HYDROCHLORIDE 1 PATCH: 30 CREAM TOPICAL at 12:10

## 2025-02-01 RX ADMIN — Medication 30 MILLILITER(S): at 21:42

## 2025-02-01 RX ADMIN — FAMOTIDINE 20 MILLIGRAM(S): 10 INJECTION INTRAVENOUS at 21:43

## 2025-02-01 RX ADMIN — Medication 1 DROP(S): at 14:43

## 2025-02-01 RX ADMIN — ACETAMINOPHEN 1000 MILLIGRAM(S): 160 SUSPENSION ORAL at 16:00

## 2025-02-01 RX ADMIN — MORPHINE SULFATE 5 MILLIGRAM(S): 60 TABLET, FILM COATED, EXTENDED RELEASE ORAL at 18:50

## 2025-02-01 RX ADMIN — ACETAMINOPHEN 1000 MILLIGRAM(S): 160 SUSPENSION ORAL at 00:30

## 2025-02-01 RX ADMIN — POLYETHYLENE GLYCOL 3350 17 GRAM(S): 17 POWDER, FOR SOLUTION ORAL at 12:10

## 2025-02-01 RX ADMIN — ACETAMINOPHEN 1000 MILLIGRAM(S): 160 SUSPENSION ORAL at 17:00

## 2025-02-01 RX ADMIN — Medication 1 DROP(S): at 07:19

## 2025-02-01 RX ADMIN — ACETAMINOPHEN 400 MILLIGRAM(S): 160 SUSPENSION ORAL at 07:18

## 2025-02-01 NOTE — PROGRESS NOTE ADULT - SUBJECTIVE AND OBJECTIVE BOX
Name of Patient : TAHIR PENA  MRN: [id number]  Date of visit: 02-01-25       Subjective: Patient seen and examined. No new events except as noted.   doing okay     REVIEW OF SYSTEMS:    CONSTITUTIONAL: No weakness, fevers or chills  EYES/ENT: No visual changes;  No vertigo or throat pain   NECK: No pain or stiffness  RESPIRATORY: No cough, wheezing, hemoptysis; No shortness of breath  CARDIOVASCULAR: No chest pain or palpitations  GASTROINTESTINAL: No abdominal or epigastric pain. No nausea, vomiting, or hematemesis; No diarrhea or constipation. No melena or hematochezia.  GENITOURINARY: No dysuria, frequency or hematuria  NEUROLOGICAL: No numbness or weakness  SKIN: No itching, burning, rashes, or lesions   All other review of systems is negative unless indicated above.    MEDICATIONS:  MEDICATIONS  (STANDING):  artificial tears (preservative free) Ophthalmic Solution 1 Drop(s) Both EYES three times a day  benzonatate 100 milliGRAM(s) Oral three times a day  enoxaparin Injectable 60 milliGRAM(s) SubCutaneous every 24 hours  famotidine    Tablet 20 milliGRAM(s) Oral at bedtime  gabapentin 300 milliGRAM(s) Oral <User Schedule>  gabapentin 200 milliGRAM(s) Oral <User Schedule>  lidocaine   4% Patch 1 Patch Transdermal daily  pantoprazole    Tablet 40 milliGRAM(s) Oral before breakfast  polyethylene glycol 3350 17 Gram(s) Oral daily  senna 2 Tablet(s) Oral at bedtime  sodium chloride 0.9% Solution for Pleural Effusion 30 milliLiter(s) IntraPleural. every 12 hours      PHYSICAL EXAM:  T(C): 37.1 (02-01-25 @ 18:16), Max: 37.1 (02-01-25 @ 10:17)  HR: 98 (02-01-25 @ 18:16) (95 - 98)  BP: 97/52 (02-01-25 @ 18:16) (97/52 - 105/62)  RR: 18 (02-01-25 @ 18:16) (17 - 18)  SpO2: 98% (02-01-25 @ 18:16) (96% - 98%)  Wt(kg): --  I&O's Summary    Appearance: Normal	  HEENT:  PERRLA   Lymphatic: No lymphadenopathy   Cardiovascular: Normal S1 S2, no JVD  Respiratory: normal effort , clear  Gastrointestinal:  Soft, Non-tender  Skin: No rashes,  warm to touch  Psychiatry:  Mood & affect appropriate  Musculuskeletal: No edema    recent labs, Imaging and EKGs personally reviewed   CODE status discussed with the patient in detail                          8.1    4.60  )-----------( 187      ( 31 Jan 2025 05:55 )             26.2               01-31    136  |  103  |  16  ----------------------------<  89  4.1   |  22  |  0.56    Ca    9.5      31 Jan 2025 05:55  Phos  3.3     01-31  Mg     2.00     01-31                         Urinalysis Basic - ( 31 Jan 2025 05:55 )    Color: x / Appearance: x / SG: x / pH: x  Gluc: 89 mg/dL / Ketone: x  / Bili: x / Urobili: x   Blood: x / Protein: x / Nitrite: x   Leuk Esterase: x / RBC: x / WBC x   Sq Epi: x / Non Sq Epi: x / Bacteria: x

## 2025-02-01 NOTE — PROGRESS NOTE ADULT - ASSESSMENT
75 y/o F with history of recurrent rectal cancer admitted for dyspnea with right pleural effusion.     1. Recurrent metastatic rectal cancer  ·	Recent progression on FOLFOX with plan to switch to L[place]urf +/- bevacizumab  ·	s/p palliative radiation therapy to the right rib area as well as spine.    ·	No disease directed therapy as inpatient  ·	Patient to follow with Dr. Zoe Goldberg at [facility] upon discharge  ·	Pain better controlled but still with intermittent pains, palliative care team following to optimize pain control     2.  Dyspnea 2/2 pleural effusion  ·	CT surgery following, s/p chest tube now removed, s/p MIST x 6    3. Acute b/l LE DVT  ·	Doppler with acute deep venous thrombosis: above and below the knee. Extensive DVT throughout the RIGHT lower extremity. DVT in the LEFT femoral vein.  ·	Vascular following, s/p IVC filter placement 01/30  ·	Cont with therapeutic anticoagulation- Lovenox 1.5mg/kg daily. Currently on 60mg daily which pt also has supply of at home- will discuss with primary team and her [facility] team regarding dosing upon discharge    Discharge planning in progress pending pain control. Will continue to follow.    Shreya Aguiar PA-C  Hematology/Oncology  [facility]  [phone] (office)  [phone] (alt office)  Evenings and weekends please call MD on call or office

## 2025-02-01 NOTE — CHART NOTE - NSCHARTNOTEFT_GEN_A_CORE
Patient is a 74 year old female with rectal cancer with bone and lung metastases s/p RT currently on chemotherapy, 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     Informed by RN that patient has nose bleed. Patient assessed at bedside. Patient denies any n/v, dizziness, chest pain or SOB. Patient reports that the room is just too hot.   Bleeding noted from right nostril. No acute distress noted. Patient otherwise resting comfortably.     Pressure was applied to right nostril for 20 minutes and bleeding later stopped. Engineered contacted and arrived at bedside to adjust the temperature in patient's room.     Of note, patient noted with Colace at bedside. Reports that she prefers to take colace over senna because she doesn't need a laxative, she needs a stool softener.   Lori Patient is a 74 year old female with rectal cancer with bone and lung metastases s/p RT currently on chemotherapy, 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     Informed by RN that patient has nose bleed. Patient assessed at bedside. Patient denies any n/v, dizziness, chest pain or SOB. Patient reports that the room is just too hot.   Bleeding noted from right nostril. No acute distress noted. Patient otherwise resting comfortably.     Pressure was applied to right nostril for 20 minutes and bleeding later stopped. Engineered contacted and arrived at bedside to adjust the temperature in patient's room.     Of note, patient has Colace 100mg tabs at bedside. Reports that she prefers to take colace over senna because she doesn't need a laxative, she needs a stool softener.   Educated patient on the importance of only taking medications that are ordered while in the hospital and offered by the nurse. Patient verbalized understanding.   Pharmacy contacted and order placed for Colace 100mg PO daily. Senna discontinued.     Vital Signs Last 24 Hrs  T(C): 36.6 (01 Feb 2025 21:19), Max: 37.1 (01 Feb 2025 10:17)  T(F): 97.9 (01 Feb 2025 21:19), Max: 98.8 (01 Feb 2025 10:17)  HR: 94 (01 Feb 2025 21:19) (94 - 98)  BP: 118/69 (01 Feb 2025 21:19) (97/52 - 118/69)  RR: 18 (01 Feb 2025 21:19) (17 - 18)  SpO2: 94% (01 Feb 2025 21:19) (94% - 98%)  Patient On (Oxygen Delivery Method): room air    Will continue to monitor patient overnight.     Amna Reyes,   Overnight [facility] coverage   [id number] Patient is a 74 year old female with rectal cancer with bone and lung metastases s/p RT currently on chemotherapy, 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     Informed by RN that patient has nose bleed. Patient assessed at bedside. Patient denies any n/v, dizziness, chest pain or SOB. Patient reports that the room is just too hot.   Bleeding noted from right nostril. No acute distress noted.   Pressure was applied to right nostril for 20 minutes and bleeding later stopped. Engineered contacted and arrived at bedside to adjust the temperature in patient's room.     Of note, patient has Colace 100mg tabs at bedside. Reports that she prefers to take colace over senna because she doesn't need a laxative, she needs a stool softener.   Educated patient on the importance of only taking medications that are ordered while in the hospital and offered by the nurse. Patient verbalized understanding.   Pharmacy contacted and order placed for Colace 100mg PO daily. Senna discontinued.     Vital Signs Last 24 Hrs  T(C): 36.6 (01 Feb 2025 21:19), Max: 37.1 (01 Feb 2025 10:17)  T(F): 97.9 (01 Feb 2025 21:19), Max: 98.8 (01 Feb 2025 10:17)  HR: 94 (01 Feb 2025 21:19) (94 - 98)  BP: 118/69 (01 Feb 2025 21:19) (97/52 - 118/69)  RR: 18 (01 Feb 2025 21:19) (17 - 18)  SpO2: 94% (01 Feb 2025 21:19) (94% - 98%)  Patient On (Oxygen Delivery Method): room air    Will continue to monitor patient overnight.     Amna Reyes,   Overnight [facility] coverage   [id number] Patient is a 74 year old female with rectal cancer with bone and lung metastases s/p RT currently on chemotherapy, 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     Informed by RN that patient has nose bleed. Patient assessed at bedside. Patient denies any n/v, dizziness, chest pain or SOB. Patient reports that the room is just too hot.   Bleeding noted from right nostril. No acute distress noted.     Plan:  - apply pressure to right nostril for 20 minutes  - adjust room temperature  - follow cbc in AM labs    Bleeding later stopped after pressure was applied. Engineered contacted and arrived at bedside to adjust the temperature in patient's room.     Of note, patient has Colace 100mg tabs at bedside. Reports that she prefers to take colace over senna because she doesn't need a laxative, she needs a stool softener.   Educated patient on the importance of only taking medications that are ordered while in the hospital and offered by the nurse. Patient verbalized understanding.   Pharmacy contacted and order placed for Colace 100mg PO daily. Senna discontinued.     Vital Signs Last 24 Hrs  T(C): 36.6 (01 Feb 2025 21:19), Max: 37.1 (01 Feb 2025 10:17)  T(F): 97.9 (01 Feb 2025 21:19), Max: 98.8 (01 Feb 2025 10:17)  HR: 94 (01 Feb 2025 21:19) (94 - 98)  BP: 118/69 (01 Feb 2025 21:19) (97/52 - 118/69)  RR: 18 (01 Feb 2025 21:19) (17 - 18)  SpO2: 94% (01 Feb 2025 21:19) (94% - 98%)  Patient On (Oxygen Delivery Method): room air    Will continue to monitor patient overnight.     Amna Reyes,   Overnight [facility] coverage   [id number]

## 2025-02-01 NOTE — PROGRESS NOTE ADULT - SUBJECTIVE AND OBJECTIVE BOX
Patient is a 74y old  Female who presents with a chief complaint of pleural effusion (31 Jan 2025 12:49)    Pt seen this morning. States her pain is better controlled currently.    MEDICATIONS  (STANDING):  artificial tears (preservative free) Ophthalmic Solution 1 Drop(s) Both EYES three times a day  benzonatate 100 milliGRAM(s) Oral three times a day  enoxaparin Injectable 60 milliGRAM(s) SubCutaneous every 24 hours  famotidine    Tablet 20 milliGRAM(s) Oral at bedtime  gabapentin 300 milliGRAM(s) Oral <User Schedule>  gabapentin 200 milliGRAM(s) Oral <User Schedule>  lidocaine   4% Patch 1 Patch Transdermal daily  pantoprazole    Tablet 40 milliGRAM(s) Oral before breakfast  polyethylene glycol 3350 17 Gram(s) Oral daily  senna 2 Tablet(s) Oral at bedtime  sodium chloride 0.9% Solution for Pleural Effusion 30 milliLiter(s) IntraPleural. every 12 hours    MEDICATIONS  (PRN):  acetaminophen     Tablet .. 1000 milliGRAM(s) Oral every 8 hours PRN Mild Pain (1 - 3)  aluminum hydroxide/magnesium hydroxide/simethicone Suspension 30 milliLiter(s) Oral every 4 hours PRN Dyspepsia  guaiFENesin  milliGRAM(s) Oral every 12 hours PRN Cough  melatonin 3 milliGRAM(s) Oral at bedtime PRN Insomnia  morphine   Solution 5 milliGRAM(s) Oral every 4 hours PRN Moderate-severe pain  ondansetron   Disintegrating Tablet 4 milliGRAM(s) Oral every 6 hours PRN Nausea and/or Vomiting  ondansetron Injectable 4 milliGRAM(s) IV Push every 8 hours PRN Nausea and/or Vomiting  sodium chloride 0.65% Nasal 1 Spray(s) Both Nostrils four times a day PRN Nasal Congestion        Vital Signs Last 24 Hrs  T(C): 36.7 (31 Jan 2025 17:30), Max: 36.7 (31 Jan 2025 09:29)  T(F): 98.1 (31 Jan 2025 17:30), Max: 98.1 (31 Jan 2025 09:29)  HR: 101 (31 Jan 2025 17:30) (96 - 102)  BP: 117/67 (31 Jan 2025 17:30) (112/61 - 117/67)  BP(mean): --  RR: 16 (31 Jan 2025 17:30) (16 - 16)  SpO2: 96% (31 Jan 2025 17:30) (96% - 99%)    Parameters below as of 31 Jan 2025 17:30  Patient On (Oxygen Delivery Method): room air        PE  NAD  Awake, alert  Anicteric, MMM  No c/c/e  No rash grossly                            8.1    4.60  )-----------( 187      ( 31 Jan 2025 05:55 )             26.2       01-31    136  |  103  |  16  ----------------------------<  89  4.1   |  22  |  0.56    Ca    9.5      31 Jan 2025 05:55  Phos  3.3     01-31  Mg     2.00     01-31

## 2025-02-02 LAB
ANION GAP SERPL CALC-SCNC: 10 MMOL/L — SIGNIFICANT CHANGE UP (ref 7–14)
BUN SERPL-MCNC: 16 MG/DL — SIGNIFICANT CHANGE UP (ref 7–23)
CALCIUM SERPL-MCNC: 9.1 MG/DL — SIGNIFICANT CHANGE UP (ref 8.4–10.5)
CHLORIDE SERPL-SCNC: 105 MMOL/L — SIGNIFICANT CHANGE UP (ref 98–107)
CO2 SERPL-SCNC: 24 MMOL/L — SIGNIFICANT CHANGE UP (ref 22–31)
CREAT SERPL-MCNC: 0.62 MG/DL — SIGNIFICANT CHANGE UP (ref 0.5–1.3)
EGFR: 95 ML/MIN/1.73M2 — SIGNIFICANT CHANGE UP
GLUCOSE SERPL-MCNC: 94 MG/DL — SIGNIFICANT CHANGE UP (ref 70–99)
HCT VFR BLD CALC: 26.7 % — LOW (ref 34.5–45)
HGB BLD-MCNC: 7.9 G/DL — LOW (ref 11.5–15.5)
MAGNESIUM SERPL-MCNC: 2 MG/DL — SIGNIFICANT CHANGE UP (ref 1.6–2.6)
MCHC RBC-ENTMCNC: 28.9 PG — SIGNIFICANT CHANGE UP (ref 27–34)
MCHC RBC-ENTMCNC: 29.6 G/DL — LOW (ref 32–36)
MCV RBC AUTO: 97.8 FL — SIGNIFICANT CHANGE UP (ref 80–100)
NRBC # BLD AUTO: 0 K/UL — SIGNIFICANT CHANGE UP (ref 0–0)
NRBC # BLD: 0 /100 WBCS — SIGNIFICANT CHANGE UP (ref 0–0)
NRBC # FLD: 0 K/UL — SIGNIFICANT CHANGE UP (ref 0–0)
NRBC BLD-RTO: 0 /100 WBCS — SIGNIFICANT CHANGE UP (ref 0–0)
PHOSPHATE SERPL-MCNC: 3 MG/DL — SIGNIFICANT CHANGE UP (ref 2.5–4.5)
PLATELET # BLD AUTO: 203 K/UL — SIGNIFICANT CHANGE UP (ref 150–400)
POTASSIUM SERPL-MCNC: 4.4 MMOL/L — SIGNIFICANT CHANGE UP (ref 3.5–5.3)
POTASSIUM SERPL-SCNC: 4.4 MMOL/L — SIGNIFICANT CHANGE UP (ref 3.5–5.3)
RBC # BLD: 2.73 M/UL — LOW (ref 3.8–5.2)
RBC # FLD: 17.5 % — HIGH (ref 10.3–14.5)
SODIUM SERPL-SCNC: 139 MMOL/L — SIGNIFICANT CHANGE UP (ref 135–145)
WBC # BLD: 5.51 K/UL — SIGNIFICANT CHANGE UP (ref 3.8–10.5)
WBC # FLD AUTO: 5.51 K/UL — SIGNIFICANT CHANGE UP (ref 3.8–10.5)

## 2025-02-02 RX ORDER — ACETAMINOPHEN 160 MG/5ML
1000 SUSPENSION ORAL ONCE
Refills: 0 | Status: COMPLETED | OUTPATIENT
Start: 2025-02-02 | End: 2025-02-02

## 2025-02-02 RX ADMIN — MORPHINE SULFATE 5 MILLIGRAM(S): 60 TABLET, FILM COATED, EXTENDED RELEASE ORAL at 11:15

## 2025-02-02 RX ADMIN — Medication 100 MILLIGRAM(S): at 22:32

## 2025-02-02 RX ADMIN — PANTOPRAZOLE 40 MILLIGRAM(S): 20 TABLET, DELAYED RELEASE ORAL at 05:04

## 2025-02-02 RX ADMIN — GABAPENTIN 300 MILLIGRAM(S): 800 TABLET ORAL at 09:15

## 2025-02-02 RX ADMIN — ACETAMINOPHEN 1000 MILLIGRAM(S): 160 SUSPENSION ORAL at 17:42

## 2025-02-02 RX ADMIN — GABAPENTIN 200 MILLIGRAM(S): 800 TABLET ORAL at 14:46

## 2025-02-02 RX ADMIN — ACETAMINOPHEN 1000 MILLIGRAM(S): 160 SUSPENSION ORAL at 16:45

## 2025-02-02 RX ADMIN — Medication 1 DROP(S): at 22:35

## 2025-02-02 RX ADMIN — FAMOTIDINE 20 MILLIGRAM(S): 10 INJECTION INTRAVENOUS at 22:32

## 2025-02-02 RX ADMIN — Medication 1 DROP(S): at 05:03

## 2025-02-02 RX ADMIN — MORPHINE SULFATE 5 MILLIGRAM(S): 60 TABLET, FILM COATED, EXTENDED RELEASE ORAL at 12:15

## 2025-02-02 RX ADMIN — ACETAMINOPHEN 1000 MILLIGRAM(S): 160 SUSPENSION ORAL at 02:55

## 2025-02-02 RX ADMIN — GABAPENTIN 300 MILLIGRAM(S): 800 TABLET ORAL at 22:33

## 2025-02-02 RX ADMIN — Medication 100 MILLIGRAM(S): at 14:39

## 2025-02-02 RX ADMIN — Medication 1 DROP(S): at 14:40

## 2025-02-02 RX ADMIN — MORPHINE SULFATE 5 MILLIGRAM(S): 60 TABLET, FILM COATED, EXTENDED RELEASE ORAL at 19:50

## 2025-02-02 RX ADMIN — ACETAMINOPHEN 1000 MILLIGRAM(S): 160 SUSPENSION ORAL at 08:29

## 2025-02-02 RX ADMIN — ACETAMINOPHEN 400 MILLIGRAM(S): 160 SUSPENSION ORAL at 01:02

## 2025-02-02 RX ADMIN — Medication 100 MILLIGRAM(S): at 05:03

## 2025-02-02 NOTE — PROGRESS NOTE ADULT - SUBJECTIVE AND OBJECTIVE BOX
Name of Patient : TAHIR PENA  MRN: [id number]  Date of visit: 02-02-25 @ 13:38      Subjective: Patient seen and examined. No new events except as noted.   doing okay      REVIEW OF SYSTEMS:    CONSTITUTIONAL: No weakness, fevers or chills  EYES/ENT: No visual changes;  No vertigo or throat pain   NECK: No pain or stiffness  RESPIRATORY: No cough, wheezing, hemoptysis; No shortness of breath  CARDIOVASCULAR: No chest pain or palpitations  GASTROINTESTINAL: No abdominal or epigastric pain. No nausea, vomiting, or hematemesis; No diarrhea or constipation. No melena or hematochezia.  GENITOURINARY: No dysuria, frequency or hematuria  NEUROLOGICAL: No numbness or weakness  SKIN: No itching, burning, rashes, or lesions   All other review of systems is negative unless indicated above.    MEDICATIONS:  MEDICATIONS  (STANDING):  artificial tears (preservative free) Ophthalmic Solution 1 Drop(s) Both EYES three times a day  benzonatate 100 milliGRAM(s) Oral three times a day  docusate sodium 100 milliGRAM(s) Oral daily  enoxaparin Injectable 60 milliGRAM(s) SubCutaneous every 24 hours  famotidine    Tablet 20 milliGRAM(s) Oral at bedtime  gabapentin 200 milliGRAM(s) Oral <User Schedule>  gabapentin 300 milliGRAM(s) Oral <User Schedule>  lidocaine   4% Patch 1 Patch Transdermal daily  pantoprazole    Tablet 40 milliGRAM(s) Oral before breakfast  polyethylene glycol 3350 17 Gram(s) Oral daily  sodium chloride 0.9% Solution for Pleural Effusion 30 milliLiter(s) IntraPleural. every 12 hours      PHYSICAL EXAM:  T(C): 36.1 (02-02-25 @ 10:04), Max: 37.2 (02-02-25 @ 05:11)  HR: 92 (02-02-25 @ 10:04) (92 - 98)  BP: 111/60 (02-02-25 @ 10:04) (97/52 - 124/64)  RR: 18 (02-02-25 @ 10:04) (18 - 18)  SpO2: 97% (02-02-25 @ 10:04) (94% - 98%)  Wt(kg): --  I&O's Summary        Appearance: Normal	  HEENT:  PERRLA   Lymphatic: No lymphadenopathy   Cardiovascular: Normal S1 S2, no JVD  Respiratory: normal effort , clear  Gastrointestinal:  Soft, Non-tender  Skin: No rashes,  warm to touch  Psychiatry:  Mood & affect appropriate  Musculuskeletal: No edema    recent labs, Imaging and EKGs personally reviewed   CODE status discussed with the patient in detail                          7.9    5.51  )-----------( 203      ( 02 Feb 2025 05:40 )             26.7               02-02    139  |  105  |  16  ----------------------------<  94  4.4   |  24  |  0.62    Ca    9.1      02 Feb 2025 05:40  Phos  3.0     02-02  Mg     2.00     02-02                         Urinalysis Basic - ( 02 Feb 2025 05:40 )    Color: x / Appearance: x / SG: x / pH: x  Gluc: 94 mg/dL / Ketone: x  / Bili: x / Urobili: x   Blood: x / Protein: x / Nitrite: x   Leuk Esterase: x / RBC: x / WBC x   Sq Epi: x / Non Sq Epi: x / Bacteria: x

## 2025-02-02 NOTE — PROGRESS NOTE ADULT - SUBJECTIVE AND OBJECTIVE BOX
Patient is a 74y old  Female who presents with a chief complaint of pleural effusion (01 Feb 2025 11:55)    Pt seen this morning with her sister at bedside. Multiple complaints currently- nose bleed over night, back pain, throat discomfort, mild dyspnea    MEDICATIONS  (STANDING):  artificial tears (preservative free) Ophthalmic Solution 1 Drop(s) Both EYES three times a day  benzonatate 100 milliGRAM(s) Oral three times a day  docusate sodium 100 milliGRAM(s) Oral daily  enoxaparin Injectable 60 milliGRAM(s) SubCutaneous every 24 hours  famotidine    Tablet 20 milliGRAM(s) Oral at bedtime  gabapentin 200 milliGRAM(s) Oral <User Schedule>  gabapentin 300 milliGRAM(s) Oral <User Schedule>  lidocaine   4% Patch 1 Patch Transdermal daily  pantoprazole    Tablet 40 milliGRAM(s) Oral before breakfast  polyethylene glycol 3350 17 Gram(s) Oral daily  sodium chloride 0.9% Solution for Pleural Effusion 30 milliLiter(s) IntraPleural. every 12 hours    MEDICATIONS  (PRN):  acetaminophen     Tablet .. 1000 milliGRAM(s) Oral every 8 hours PRN Mild Pain (1 - 3)  aluminum hydroxide/magnesium hydroxide/simethicone Suspension 30 milliLiter(s) Oral every 4 hours PRN Dyspepsia  guaiFENesin  milliGRAM(s) Oral every 12 hours PRN Cough  melatonin 3 milliGRAM(s) Oral at bedtime PRN Insomnia  morphine   Solution 5 milliGRAM(s) Oral every 4 hours PRN Moderate-severe pain  ondansetron   Disintegrating Tablet 4 milliGRAM(s) Oral every 6 hours PRN Nausea and/or Vomiting  ondansetron Injectable 4 milliGRAM(s) IV Push every 8 hours PRN Nausea and/or Vomiting  sodium chloride 0.65% Nasal 1 Spray(s) Both Nostrils four times a day PRN Nasal Congestion        Vital Signs Last 24 Hrs  T(C): 36.1 (02 Feb 2025 10:04), Max: 37.2 (02 Feb 2025 05:11)  T(F): 97 (02 Feb 2025 10:04), Max: 99 (02 Feb 2025 05:11)  HR: 92 (02 Feb 2025 10:04) (92 - 98)  BP: 111/60 (02 Feb 2025 10:04) (97/52 - 124/64)  BP(mean): --  RR: 18 (02 Feb 2025 10:04) (18 - 18)  SpO2: 97% (02 Feb 2025 10:04) (94% - 98%)    Parameters below as of 02 Feb 2025 10:04  Patient On (Oxygen Delivery Method): room air        PE  NAD  Awake, alert  Anicteric, MMM  No c/c/e  No rash grossly                            7.9    5.51  )-----------( 203      ( 02 Feb 2025 05:40 )             26.7       02-02    139  |  105  |  16  ----------------------------<  94  4.4   |  24  |  0.62    Ca    9.1      02 Feb 2025 05:40  Phos  3.0     02-02  Mg     2.00     02-02

## 2025-02-02 NOTE — PROGRESS NOTE ADULT - ASSESSMENT
73 y/o F with history of recurrent rectal cancer admitted for dyspnea with right pleural effusion.     1. Recurrent metastatic rectal cancer  ·	Recent progression on FOLFOX with plan to switch to [place]f +/- bevacizumab  ·	s/p palliative radiation therapy to the right rib area as well as spine.    ·	No disease directed therapy as inpatient  ·	Patient to follow with Dr. Zoe Goldberg at [facility] upon discharge  ·	Pain better controlled but still with intermittent pains, palliative care team following to optimize pain control     2.  Dyspnea 2/2 pleural effusion  ·	CT surgery following, s/p chest tube now removed, s/p MIST x 6    3. Acute b/l LE DVT  ·	Doppler with acute deep venous thrombosis: above and below the knee. Extensive DVT throughout the RIGHT lower extremity. DVT in the LEFT femoral vein.  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 Will continue to follow.    Shreya Aguiar PA-C  Hematology/Oncology  [facility]  [phone] (office)  [phone] (alt office)  Evenings and weekends please call MD on call or office

## 2025-02-03 LAB
ANION GAP SERPL CALC-SCNC: 10 MMOL/L — SIGNIFICANT CHANGE UP (ref 7–14)
BUN SERPL-MCNC: 14 MG/DL — SIGNIFICANT CHANGE UP (ref 7–23)
CALCIUM SERPL-MCNC: 9.4 MG/DL — SIGNIFICANT CHANGE UP (ref 8.4–10.5)
CHLORIDE SERPL-SCNC: 103 MMOL/L — SIGNIFICANT CHANGE UP (ref 98–107)
CO2 SERPL-SCNC: 25 MMOL/L — SIGNIFICANT CHANGE UP (ref 22–31)
CREAT SERPL-MCNC: 0.61 MG/DL — SIGNIFICANT CHANGE UP (ref 0.5–1.3)
EGFR: 95 ML/MIN/1.73M2 — SIGNIFICANT CHANGE UP
GLUCOSE SERPL-MCNC: 114 MG/DL — HIGH (ref 70–99)
HCT VFR BLD CALC: 29.4 % — LOW (ref 34.5–45)
HGB BLD-MCNC: 8.5 G/DL — LOW (ref 11.5–15.5)
MAGNESIUM SERPL-MCNC: 2 MG/DL — SIGNIFICANT CHANGE UP (ref 1.6–2.6)
MCHC RBC-ENTMCNC: 28.9 G/DL — LOW (ref 32–36)
MCHC RBC-ENTMCNC: 28.9 PG — SIGNIFICANT CHANGE UP (ref 27–34)
MCV RBC AUTO: 100 FL — SIGNIFICANT CHANGE UP (ref 80–100)
NRBC # BLD AUTO: 0 K/UL — SIGNIFICANT CHANGE UP (ref 0–0)
NRBC # BLD: 0 /100 WBCS — SIGNIFICANT CHANGE UP (ref 0–0)
NRBC # FLD: 0 K/UL — SIGNIFICANT CHANGE UP (ref 0–0)
NRBC BLD-RTO: 0 /100 WBCS — SIGNIFICANT CHANGE UP (ref 0–0)
PHOSPHATE SERPL-MCNC: 2.9 MG/DL — SIGNIFICANT CHANGE UP (ref 2.5–4.5)
PLATELET # BLD AUTO: 221 K/UL — SIGNIFICANT CHANGE UP (ref 150–400)
POTASSIUM SERPL-MCNC: 4.3 MMOL/L — SIGNIFICANT CHANGE UP (ref 3.5–5.3)
POTASSIUM SERPL-SCNC: 4.3 MMOL/L — SIGNIFICANT CHANGE UP (ref 3.5–5.3)
RBC # BLD: 2.94 M/UL — LOW (ref 3.8–5.2)
RBC # FLD: 17.6 % — HIGH (ref 10.3–14.5)
SODIUM SERPL-SCNC: 138 MMOL/L — SIGNIFICANT CHANGE UP (ref 135–145)
WBC # BLD: 4.91 K/UL — SIGNIFICANT CHANGE UP (ref 3.8–10.5)
WBC # FLD AUTO: 4.91 K/UL — SIGNIFICANT CHANGE UP (ref 3.8–10.5)

## 2025-02-03 RX ADMIN — MORPHINE SULFATE 5 MILLIGRAM(S): 60 TABLET, FILM COATED, EXTENDED RELEASE ORAL at 05:13

## 2025-02-03 RX ADMIN — ACETAMINOPHEN 1000 MILLIGRAM(S): 160 SUSPENSION ORAL at 21:30

## 2025-02-03 RX ADMIN — MORPHINE SULFATE 5 MILLIGRAM(S): 60 TABLET, FILM COATED, EXTENDED RELEASE ORAL at 11:28

## 2025-02-03 RX ADMIN — ACETAMINOPHEN 1000 MILLIGRAM(S): 160 SUSPENSION ORAL at 20:43

## 2025-02-03 RX ADMIN — Medication 1 DROP(S): at 15:39

## 2025-02-03 RX ADMIN — MORPHINE SULFATE 5 MILLIGRAM(S): 60 TABLET, FILM COATED, EXTENDED RELEASE ORAL at 22:45

## 2025-02-03 RX ADMIN — Medication 100 MILLIGRAM(S): at 05:13

## 2025-02-03 RX ADMIN — GABAPENTIN 300 MILLIGRAM(S): 800 TABLET ORAL at 09:07

## 2025-02-03 RX ADMIN — ACETAMINOPHEN 1000 MILLIGRAM(S): 160 SUSPENSION ORAL at 00:20

## 2025-02-03 RX ADMIN — MORPHINE SULFATE 5 MILLIGRAM(S): 60 TABLET, FILM COATED, EXTENDED RELEASE ORAL at 17:05

## 2025-02-03 RX ADMIN — LIDOCAINE HYDROCHLORIDE 1 PATCH: 30 CREAM TOPICAL at 11:30

## 2025-02-03 RX ADMIN — GABAPENTIN 200 MILLIGRAM(S): 800 TABLET ORAL at 18:43

## 2025-02-03 RX ADMIN — Medication 100 MILLIGRAM(S): at 20:44

## 2025-02-03 RX ADMIN — ACETAMINOPHEN 1000 MILLIGRAM(S): 160 SUSPENSION ORAL at 01:00

## 2025-02-03 RX ADMIN — ACETAMINOPHEN 1000 MILLIGRAM(S): 160 SUSPENSION ORAL at 07:35

## 2025-02-03 RX ADMIN — FAMOTIDINE 20 MILLIGRAM(S): 10 INJECTION INTRAVENOUS at 20:44

## 2025-02-03 RX ADMIN — Medication 1 DROP(S): at 20:44

## 2025-02-03 RX ADMIN — GABAPENTIN 300 MILLIGRAM(S): 800 TABLET ORAL at 20:44

## 2025-02-03 RX ADMIN — MORPHINE SULFATE 5 MILLIGRAM(S): 60 TABLET, FILM COATED, EXTENDED RELEASE ORAL at 06:00

## 2025-02-03 RX ADMIN — Medication 1 DROP(S): at 05:13

## 2025-02-03 RX ADMIN — PANTOPRAZOLE 40 MILLIGRAM(S): 20 TABLET, DELAYED RELEASE ORAL at 07:51

## 2025-02-03 RX ADMIN — Medication 100 MILLIGRAM(S): at 15:41

## 2025-02-03 RX ADMIN — MORPHINE SULFATE 5 MILLIGRAM(S): 60 TABLET, FILM COATED, EXTENDED RELEASE ORAL at 23:30

## 2025-02-03 RX ADMIN — ENOXAPARIN SODIUM 60 MILLIGRAM(S): 100 INJECTION SUBCUTANEOUS at 07:39

## 2025-02-03 NOTE — CHART NOTE - NSCHARTNOTEFT_GEN_A_CORE
Per CM pt concern about discharging plan. Inquire what to do if nose bleed started again and if provider can give her nose packing to go home with in case she needs it.  Discussed with patient as long as nose bleed resolve with pressure there is no indication for packing and we do not discharge pt with packing as there is risk of infection. If nose bleed does not resolve with pressure for more than 10-15mins she would need to seek medical help.     Pt later inquire regarding RUE swelling, pt examined at bedside no significant swelling noted on the RUE. pt expressed pain near the forearm but able to move/bend RUE with discomfort noted.     Pt O2 sat above 90 on room air but concern if she needs extra oxygen at home, discussed she does not qualify for oxygen at this time, can check overnight pulse ox if no indication hypoxia insurance will not cover oxygen for her.     CM spoke with pt regarding arrangement for private hire. Per CM pt concern about discharging plan. Inquire what to do if nose bleed start again and if provider can give her nose packing to go home with in case she needs it.  Discussed with patient as long as nose bleed resolve with pressure there is no indication for packing and we do not discharge pt with packing as there is risk of infection. If nose bleed does not resolve with pressure for more than 10-15mins she would need to seek medical help.     Pt later inquire regarding RUE swelling, pt examined at bedside no significant swelling noted on the RUE. pt expressed pain near the forearm but able to move/bend RUE with discomfort noted.     Pt O2 sat above 90 on room air but concern if she needs extra oxygen at home, discussed she does not qualify for oxygen at this time, can check overnight pulse ox if no indication hypoxia insurance will not cover oxygen for her.     CM spoke with pt regarding arrangement for private hire. Per CM pt concern about discharging plan. Inquire what to do if nose bleed start again and if provider can give her nose packing to go home with in case she needs it.  Discussed with patient as long as nose bleed resolve with pressure there is no indication for packing and we do not discharge pt with packing as there is risk of infection. If nose bleed does not resolve with pressure for more than 10-15mins she would need to seek medical help.     Pt later inquire regarding RUE swelling, pt examined at bedside no significant swelling noted on the RUE. pt expressed pain near the forearm but able to move/bend RUE with no discomfort noted.     Pt O2 sat above 90 on room air but concern if she needs extra oxygen at home, discussed she does not qualify for oxygen at this time, can check overnight pulse ox if no indication hypoxia insurance will not cover oxygen for her.     CM spoke with pt regarding arrangement for private hire.

## 2025-02-03 NOTE — PROGRESS NOTE ADULT - ASSESSMENT
73 y/o F with history of recurrent rectal cancer admitted for dyspnea with right pleural effusion.     1. Recurrent metastatic rectal cancer  ·	Recent progression on FOLFOX with plan to switch to L[place]f +/- bevacizumab  ·	s/p palliative radiation therapy to the right rib area as well as spine.    ·	No disease directed therapy as inpatient  ·	Patient to follow with Dr. Zoe Goldberg at [facility] upon discharge  ·	Pain better controlled but still with intermittent pains, palliative care team following to optimize pain control     2.  Dyspnea 2/2 pleural effusion  ·	CT surgery following, s/p chest tube now removed, s/p MIST x 6  ·	Supplemental O2 as needed    3. Acute b/l LE DVT  ·	Doppler with acute deep venous thrombosis: above and below the knee. Extensive DVT throughout the RIGHT lower extremity. DVT in the LEFT femoral vein.  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 Will continue to follow.    Shreya Aguiar PA-C  Hematology/Oncology  [facility]  [phone] (office)  [phone] (alt office)  Evenings and weekends please call MD on call or office

## 2025-02-03 NOTE — PROGRESS NOTE ADULT - SUBJECTIVE AND OBJECTIVE BOX
Patient is a 74y old  Female who presents with a chief complaint of pleural effusion (02 Feb 2025 13:38)    Pt seen this morning. No new issues    MEDICATIONS  (STANDING):  artificial tears (preservative free) Ophthalmic Solution 1 Drop(s) Both EYES three times a day  benzonatate 100 milliGRAM(s) Oral three times a day  docusate sodium 100 milliGRAM(s) Oral daily  enoxaparin Injectable 60 milliGRAM(s) SubCutaneous every 24 hours  famotidine    Tablet 20 milliGRAM(s) Oral at bedtime  gabapentin 200 milliGRAM(s) Oral <User Schedule>  gabapentin 300 milliGRAM(s) Oral <User Schedule>  lidocaine   4% Patch 1 Patch Transdermal daily  pantoprazole    Tablet 40 milliGRAM(s) Oral before breakfast  polyethylene glycol 3350 17 Gram(s) Oral daily  sodium chloride 0.9% Solution for Pleural Effusion 30 milliLiter(s) IntraPleural. every 12 hours    MEDICATIONS  (PRN):  acetaminophen     Tablet .. 1000 milliGRAM(s) Oral every 8 hours PRN Mild Pain (1 - 3)  aluminum hydroxide/magnesium hydroxide/simethicone Suspension 30 milliLiter(s) Oral every 4 hours PRN Dyspepsia  guaiFENesin  milliGRAM(s) Oral every 12 hours PRN Cough  melatonin 3 milliGRAM(s) Oral at bedtime PRN Insomnia  morphine   Solution 5 milliGRAM(s) Oral every 4 hours PRN Moderate-severe pain  ondansetron   Disintegrating Tablet 4 milliGRAM(s) Oral every 6 hours PRN Nausea and/or Vomiting  ondansetron Injectable 4 milliGRAM(s) IV Push every 8 hours PRN Nausea and/or Vomiting  sodium chloride 0.65% Nasal 1 Spray(s) Both Nostrils four times a day PRN Nasal Congestion        Vital Signs Last 24 Hrs  T(C): 36.8 (03 Feb 2025 09:58), Max: 36.8 (03 Feb 2025 09:58)  T(F): 98.3 (03 Feb 2025 09:58), Max: 98.3 (03 Feb 2025 09:58)  HR: 94 (03 Feb 2025 09:58) (92 - 100)  BP: 108/61 (03 Feb 2025 09:58) (104/52 - 116/67)  BP(mean): --  RR: 17 (03 Feb 2025 09:58) (17 - 18)  SpO2: 96% (03 Feb 2025 09:58) (95% - 99%)    Parameters below as of 03 Feb 2025 09:58  Patient On (Oxygen Delivery Method): room air        PE  NAD  Awake, alert  Anicteric, MMM  No c/c/e  No rash grossly                            8.5    4.91  )-----------( 221      ( 03 Feb 2025 05:20 )             29.4       02-03    138  |  103  |  14  ----------------------------<  114[H]  4.3   |  25  |  0.61    Ca    9.4      03 Feb 2025 05:20  Phos  2.9     02-03  Mg     2.00     02-03

## 2025-02-03 NOTE — PROGRESS NOTE ADULT - SUBJECTIVE AND OBJECTIVE BOX
Name of Patient : TAHIR PENA  MRN: [id number]  Date of visit: 02-03-25 @ 15:37      Subjective: Patient seen and examined. No new events except as noted.   doin gokay  pain controlled   O2 sat stable       REVIEW OF SYSTEMS:    CONSTITUTIONAL: No weakness, fevers or chills  EYES/ENT: No visual changes;  No vertigo or throat pain   NECK: No pain or stiffness  RESPIRATORY: No cough, wheezing, hemoptysis; No shortness of breath  CARDIOVASCULAR: No chest pain or palpitations  GASTROINTESTINAL: No abdominal or epigastric pain. No nausea, vomiting, or hematemesis; No diarrhea or constipation. No melena or hematochezia.  GENITOURINARY: No dysuria, frequency or hematuria  NEUROLOGICAL: No numbness or weakness  SKIN: No itching, burning, rashes, or lesions   All other review of systems is negative unless indicated above.    MEDICATIONS:  MEDICATIONS  (STANDING):  artificial tears (preservative free) Ophthalmic Solution 1 Drop(s) Both EYES three times a day  benzonatate 100 milliGRAM(s) Oral three times a day  docusate sodium 100 milliGRAM(s) Oral daily  enoxaparin Injectable 60 milliGRAM(s) SubCutaneous every 24 hours  famotidine    Tablet 20 milliGRAM(s) Oral at bedtime  gabapentin 200 milliGRAM(s) Oral <User Schedule>  gabapentin 300 milliGRAM(s) Oral <User Schedule>  lidocaine   4% Patch 1 Patch Transdermal daily  pantoprazole    Tablet 40 milliGRAM(s) Oral before breakfast  polyethylene glycol 3350 17 Gram(s) Oral daily  sodium chloride 0.9% Solution for Pleural Effusion 30 milliLiter(s) IntraPleural. every 12 hours      PHYSICAL EXAM:  T(C): 36.8 (02-03-25 @ 09:58), Max: 36.8 (02-03-25 @ 09:58)  HR: 94 (02-03-25 @ 09:58) (92 - 100)  BP: 108/61 (02-03-25 @ 09:58) (104/52 - 116/67)  RR: 17 (02-03-25 @ 09:58) (17 - 18)  SpO2: 96% (02-03-25 @ 09:58) (95% - 99%)  Wt(kg): --  I&O's Summary    03 Feb 2025 07:01  -  03 Feb 2025 15:37  --------------------------------------------------------  IN: 0 mL / OUT: 400 mL / NET: -400 mL          Appearance: Normal	  HEENT:  PERRLA   Lymphatic: No lymphadenopathy   Cardiovascular: Normal S1 S2, no JVD  Respiratory: normal effort , clear  Gastrointestinal:  Soft, Non-tender  Skin: No rashes,  warm to touch  Psychiatry:  Mood & affect appropriate  Musculuskeletal: No edema    recent labs, Imaging and EKGs personally reviewed       02-03-25 @ 07:01  -  02-03-25 @ 15:37  --------------------------------------------------------  IN: 0 mL / OUT: 400 mL / NET: -400 mL                          8.5    4.91  )-----------( 221      ( 03 Feb 2025 05:20 )             29.4               02-03    138  |  103  |  14  ----------------------------<  114[H]  4.3   |  25  |  0.61    Ca    9.4      03 Feb 2025 05:20  Phos  2.9     02-03  Mg     2.00     02-03                         Urinalysis Basic - ( 03 Feb 2025 05:20 )    Color: x / Appearance: x / SG: x / pH: x  Gluc: 114 mg/dL / Ketone: x  / Bili: x / Urobili: x   Blood: x / Protein: x / Nitrite: x   Leuk Esterase: x / RBC: x / WBC x   Sq Epi: x / Non Sq Epi: x / Bacteria: x

## 2025-02-04 LAB
ANION GAP SERPL CALC-SCNC: 10 MMOL/L — SIGNIFICANT CHANGE UP (ref 7–14)
BUN SERPL-MCNC: 16 MG/DL — SIGNIFICANT CHANGE UP (ref 7–23)
CALCIUM SERPL-MCNC: 9.4 MG/DL — SIGNIFICANT CHANGE UP (ref 8.4–10.5)
CHLORIDE SERPL-SCNC: 102 MMOL/L — SIGNIFICANT CHANGE UP (ref 98–107)
CO2 SERPL-SCNC: 25 MMOL/L — SIGNIFICANT CHANGE UP (ref 22–31)
CREAT SERPL-MCNC: 0.62 MG/DL — SIGNIFICANT CHANGE UP (ref 0.5–1.3)
EGFR: 95 ML/MIN/1.73M2 — SIGNIFICANT CHANGE UP
GLUCOSE SERPL-MCNC: 86 MG/DL — SIGNIFICANT CHANGE UP (ref 70–99)
HCT VFR BLD CALC: 27.3 % — LOW (ref 34.5–45)
HGB BLD-MCNC: 8.2 G/DL — LOW (ref 11.5–15.5)
MAGNESIUM SERPL-MCNC: 2.1 MG/DL — SIGNIFICANT CHANGE UP (ref 1.6–2.6)
MCHC RBC-ENTMCNC: 29.4 PG — SIGNIFICANT CHANGE UP (ref 27–34)
MCHC RBC-ENTMCNC: 30 G/DL — LOW (ref 32–36)
MCV RBC AUTO: 97.8 FL — SIGNIFICANT CHANGE UP (ref 80–100)
NRBC # BLD AUTO: 0 K/UL — SIGNIFICANT CHANGE UP (ref 0–0)
NRBC # BLD: 0 /100 WBCS — SIGNIFICANT CHANGE UP (ref 0–0)
NRBC # FLD: 0 K/UL — SIGNIFICANT CHANGE UP (ref 0–0)
NRBC BLD-RTO: 0 /100 WBCS — SIGNIFICANT CHANGE UP (ref 0–0)
PHOSPHATE SERPL-MCNC: 3.6 MG/DL — SIGNIFICANT CHANGE UP (ref 2.5–4.5)
PLATELET # BLD AUTO: 225 K/UL — SIGNIFICANT CHANGE UP (ref 150–400)
POTASSIUM SERPL-MCNC: 4.4 MMOL/L — SIGNIFICANT CHANGE UP (ref 3.5–5.3)
POTASSIUM SERPL-SCNC: 4.4 MMOL/L — SIGNIFICANT CHANGE UP (ref 3.5–5.3)
RBC # BLD: 2.79 M/UL — LOW (ref 3.8–5.2)
RBC # FLD: 17.6 % — HIGH (ref 10.3–14.5)
SODIUM SERPL-SCNC: 137 MMOL/L — SIGNIFICANT CHANGE UP (ref 135–145)
WBC # BLD: 6.22 K/UL — SIGNIFICANT CHANGE UP (ref 3.8–10.5)
WBC # FLD AUTO: 6.22 K/UL — SIGNIFICANT CHANGE UP (ref 3.8–10.5)

## 2025-02-04 RX ORDER — MORPHINE SULFATE 60 MG/1
3.75 TABLET, FILM COATED, EXTENDED RELEASE ORAL
Qty: 157.5 | Refills: 0
Start: 2025-02-04 | End: 2025-02-10

## 2025-02-04 RX ORDER — ACETAMINOPHEN 160 MG/5ML
1000 SUSPENSION ORAL ONCE
Refills: 0 | Status: COMPLETED | OUTPATIENT
Start: 2025-02-04 | End: 2025-02-04

## 2025-02-04 RX ORDER — MORPHINE SULFATE 60 MG/1
7.5 TABLET, FILM COATED, EXTENDED RELEASE ORAL EVERY 4 HOURS
Refills: 0 | Status: DISCONTINUED | OUTPATIENT
Start: 2025-02-04 | End: 2025-02-06

## 2025-02-04 RX ORDER — MORPHINE SULFATE 60 MG/1
7.5 TABLET, FILM COATED, EXTENDED RELEASE ORAL ONCE
Refills: 0 | Status: DISCONTINUED | OUTPATIENT
Start: 2025-02-04 | End: 2025-02-04

## 2025-02-04 RX ORDER — MORPHINE SULFATE 60 MG/1
2.5 TABLET, FILM COATED, EXTENDED RELEASE ORAL
Qty: 105 | Refills: 0
Start: 2025-02-04 | End: 2025-02-10

## 2025-02-04 RX ADMIN — GABAPENTIN 200 MILLIGRAM(S): 800 TABLET ORAL at 17:05

## 2025-02-04 RX ADMIN — ACETAMINOPHEN 1000 MILLIGRAM(S): 160 SUSPENSION ORAL at 05:30

## 2025-02-04 RX ADMIN — GABAPENTIN 300 MILLIGRAM(S): 800 TABLET ORAL at 10:35

## 2025-02-04 RX ADMIN — Medication 100 MILLIGRAM(S): at 17:02

## 2025-02-04 RX ADMIN — PANTOPRAZOLE 40 MILLIGRAM(S): 20 TABLET, DELAYED RELEASE ORAL at 06:44

## 2025-02-04 RX ADMIN — Medication 100 MILLIGRAM(S): at 21:41

## 2025-02-04 RX ADMIN — MORPHINE SULFATE 5 MILLIGRAM(S): 60 TABLET, FILM COATED, EXTENDED RELEASE ORAL at 08:41

## 2025-02-04 RX ADMIN — Medication 1 DROP(S): at 06:44

## 2025-02-04 RX ADMIN — FAMOTIDINE 20 MILLIGRAM(S): 10 INJECTION INTRAVENOUS at 21:42

## 2025-02-04 RX ADMIN — Medication 1 DROP(S): at 17:01

## 2025-02-04 RX ADMIN — MORPHINE SULFATE 7.5 MILLIGRAM(S): 60 TABLET, FILM COATED, EXTENDED RELEASE ORAL at 15:38

## 2025-02-04 RX ADMIN — GABAPENTIN 300 MILLIGRAM(S): 800 TABLET ORAL at 21:49

## 2025-02-04 RX ADMIN — Medication 1 DROP(S): at 21:43

## 2025-02-04 RX ADMIN — ACETAMINOPHEN 400 MILLIGRAM(S): 160 SUSPENSION ORAL at 11:19

## 2025-02-04 RX ADMIN — ACETAMINOPHEN 1000 MILLIGRAM(S): 160 SUSPENSION ORAL at 18:09

## 2025-02-04 RX ADMIN — ENOXAPARIN SODIUM 60 MILLIGRAM(S): 100 INJECTION SUBCUTANEOUS at 06:44

## 2025-02-04 RX ADMIN — ACETAMINOPHEN 1000 MILLIGRAM(S): 160 SUSPENSION ORAL at 04:50

## 2025-02-04 RX ADMIN — LIDOCAINE HYDROCHLORIDE 1 PATCH: 30 CREAM TOPICAL at 11:19

## 2025-02-04 RX ADMIN — MORPHINE SULFATE 7.5 MILLIGRAM(S): 60 TABLET, FILM COATED, EXTENDED RELEASE ORAL at 21:40

## 2025-02-04 RX ADMIN — Medication 100 MILLIGRAM(S): at 06:25

## 2025-02-04 RX ADMIN — MORPHINE SULFATE 7.5 MILLIGRAM(S): 60 TABLET, FILM COATED, EXTENDED RELEASE ORAL at 22:10

## 2025-02-04 RX ADMIN — POLYETHYLENE GLYCOL 3350 17 GRAM(S): 17 POWDER, FOR SOLUTION ORAL at 11:19

## 2025-02-04 NOTE — PROGRESS NOTE ADULT - SUBJECTIVE AND OBJECTIVE BOX
Patient is a 74y old  Female who presents with a chief complaint of pleural effusion (03 Feb 2025 15:37)    Patient seen this morning. Complains of worsening pain due to delay in receiving pain meds    MEDICATIONS  (STANDING):  artificial tears (preservative free) Ophthalmic Solution 1 Drop(s) Both EYES three times a day  benzonatate 100 milliGRAM(s) Oral three times a day  docusate sodium 100 milliGRAM(s) Oral daily  enoxaparin Injectable 60 milliGRAM(s) SubCutaneous every 24 hours  famotidine    Tablet 20 milliGRAM(s) Oral at bedtime  gabapentin 300 milliGRAM(s) Oral <User Schedule>  gabapentin 200 milliGRAM(s) Oral <User Schedule>  lidocaine   4% Patch 1 Patch Transdermal daily  pantoprazole    Tablet 40 milliGRAM(s) Oral before breakfast  polyethylene glycol 3350 17 Gram(s) Oral daily  sodium chloride 0.9% Solution for Pleural Effusion 30 milliLiter(s) IntraPleural. every 12 hours    MEDICATIONS  (PRN):  acetaminophen     Tablet .. 1000 milliGRAM(s) Oral every 8 hours PRN Mild Pain (1 - 3)  aluminum hydroxide/magnesium hydroxide/simethicone Suspension 30 milliLiter(s) Oral every 4 hours PRN Dyspepsia  guaiFENesin  milliGRAM(s) Oral every 12 hours PRN Cough  melatonin 3 milliGRAM(s) Oral at bedtime PRN Insomnia  morphine   Solution 5 milliGRAM(s) Oral every 4 hours PRN Moderate-severe pain  ondansetron   Disintegrating Tablet 4 milliGRAM(s) Oral every 6 hours PRN Nausea and/or Vomiting  ondansetron Injectable 4 milliGRAM(s) IV Push every 8 hours PRN Nausea and/or Vomiting  sodium chloride 0.65% Nasal 1 Spray(s) Both Nostrils four times a day PRN Nasal Congestion        Vital Signs Last 24 Hrs  T(C): 36.5 (04 Feb 2025 10:41), Max: 36.8 (03 Feb 2025 18:55)  T(F): 97.7 (04 Feb 2025 10:41), Max: 98.2 (03 Feb 2025 18:55)  HR: 97 (04 Feb 2025 10:41) (69 - 103)  BP: 115/56 (04 Feb 2025 10:41) (101/57 - 115/56)  BP(mean): --  RR: 17 (04 Feb 2025 10:41) (17 - 18)  SpO2: 91% (04 Feb 2025 10:41) (91% - 97%)    Parameters below as of 04 Feb 2025 10:41  Patient On (Oxygen Delivery Method): room air        PE  NAD  Awake, alert  Anicteric, MMM  No c/c/e  No rash grossly                            8.2    6.22  )-----------( 225      ( 04 Feb 2025 05:45 )             27.3       02-04    137  |  102  |  16  ----------------------------<  86  4.4   |  25  |  0.62    Ca    9.4      04 Feb 2025 05:45  Phos  3.6     02-04  Mg     2.10     02-04

## 2025-02-04 NOTE — PROGRESS NOTE ADULT - ASSESSMENT
73 y/o F with history of recurrent rectal cancer admitted for dyspnea with right pleural effusion.     1. Recurrent metastatic rectal cancer  ·	Recent progression on FOLFOX with plan to switch to Lonsurf +/- bevacizumab  ·	s/p palliative radiation therapy to the right rib area as well as spine.    ·	No disease directed therapy as inpatient  ·	[place] Z[place] [facility] [place]  ·	Cont to optimize pain control    2.  Dyspnea 2/2 pleural effusion  ·	CT surgery following, s/p chest tube now removed, s/p MIST x 6  ·	Supplemental O2 as needed    3. Acute b/l LE DVT  ·	Doppler with acute deep venous thrombosis: above and below the knee. Extensive DVT throughout the RIGHT lower extremity. DVT in the LEFT femoral vein.  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 Will continue to follow.    Shreya Aguiar PA-C  Hematology/Oncology  [facility]  [phone] (office)  [phone] (alt office)  Evenings and weekends please call MD on call or office

## 2025-02-04 NOTE — PROGRESS NOTE ADULT - SUBJECTIVE AND OBJECTIVE BOX
Name of Patient : TAHIR PENA  MRN: [id number]  Date of visit: 02-04-25       Subjective: Patient seen and examined. No new events except as noted.   doing okay   pain control     REVIEW OF SYSTEMS:    CONSTITUTIONAL: No weakness, fevers or chills  EYES/ENT: No visual changes;  No vertigo or throat pain   NECK: No pain or stiffness  RESPIRATORY: No cough, wheezing, hemoptysis; No shortness of breath  CARDIOVASCULAR: No chest pain or palpitations  GASTROINTESTINAL: No abdominal or epigastric pain. No nausea, vomiting, or hematemesis; No diarrhea or constipation. No melena or hematochezia.  GENITOURINARY: No dysuria, frequency or hematuria  NEUROLOGICAL: No numbness or weakness  SKIN: No itching, burning, rashes, or lesions   All other review of systems is negative unless indicated above.    MEDICATIONS:  MEDICATIONS  (STANDING):  artificial tears (preservative free) Ophthalmic Solution 1 Drop(s) Both EYES three times a day  benzonatate 100 milliGRAM(s) Oral three times a day  docusate sodium 100 milliGRAM(s) Oral daily  enoxaparin Injectable 60 milliGRAM(s) SubCutaneous every 24 hours  famotidine    Tablet 20 milliGRAM(s) Oral at bedtime  gabapentin 300 milliGRAM(s) Oral <User Schedule>  gabapentin 200 milliGRAM(s) Oral <User Schedule>  lidocaine   4% Patch 1 Patch Transdermal daily  pantoprazole    Tablet 40 milliGRAM(s) Oral before breakfast  polyethylene glycol 3350 17 Gram(s) Oral daily  sodium chloride 0.9% Solution for Pleural Effusion 30 milliLiter(s) IntraPleural. every 12 hours      PHYSICAL EXAM:  T(C): 36.6 (02-04-25 @ 18:00), Max: 36.7 (02-04-25 @ 01:59)  HR: 99 (02-04-25 @ 18:00) (69 - 100)  BP: 120/61 (02-04-25 @ 18:00) (101/57 - 120/61)  RR: 19 (02-04-25 @ 18:00) (17 - 19)  SpO2: 97% (02-04-25 @ 18:00) (87% - 97%)  Wt(kg): --  I&O's Summary    03 Feb 2025 07:01  -  04 Feb 2025 07:00  --------------------------------------------------------  IN: 0 mL / OUT: 400 mL / NET: -400 mL          Appearance: Normal	  HEENT:  PERRLA   Lymphatic: No lymphadenopathy   Cardiovascular: Normal S1 S2, no JVD  Respiratory: normal effort , clear  Gastrointestinal:  Soft, Non-tender  Skin: No rashes,  warm to touch  Psychiatry:  Mood & affect appropriate  Musculuskeletal: No edema    recent labs, Imaging and EKGs personally reviewed   CODE status discussed with the patient in detail    02-03-25 @ 07:01  -  02-04-25 @ 07:00  --------------------------------------------------------  IN: 0 mL / OUT: 400 mL / NET: -400 mL                          8.2    6.22  )-----------( 225      ( 04 Feb 2025 05:45 )             27.3               02-04    137  |  102  |  16  ----------------------------<  86  4.4   |  25  |  0.62    Ca    9.4      04 Feb 2025 05:45  Phos  3.6     02-04  Mg     2.10     02-04                         Urinalysis Basic - ( 04 Feb 2025 05:45 )    Color: x / Appearance: x / SG: x / pH: x  Gluc: 86 mg/dL / Ketone: x  / Bili: x / Urobili: x   Blood: x / Protein: x / Nitrite: x   Leuk Esterase: x / RBC: x / WBC x   Sq Epi: x / Non Sq Epi: x / Bacteria: x

## 2025-02-04 NOTE — PROGRESS NOTE ADULT - NSPROGADDITIONALINFOA_GEN_ALL_CORE
Plan for IR thoracentesis.   pulm/ thoracic follow up.     --- Coverage for Dr. Alex ---  - Dr. HERO Velasco
Dr. Alex will be back on 1/25/25.     --- Coverage for Dr. Alex ---  - Dr. HERO Velasco
Patient seen and examined by me. patient care and plan discussed and reviewed with PA. Plan as outlined above edited by me to reflect our discussion. Advanced care planning/advanced directives discussed with patient/family. DNR status including forceful chest compressions to attempt to restart the heart, ventilator support/artificial breathing, electric shock, artificial nutrition, health care proxy, Molst form all discussed with pt. More than 50% of the visit was spent counseling and/or coordinating care by the attending physician.
discussed in detail with patient and CM at the bedside
D/C planing
Plan for IR thoracentesis.     d/w Roz.     --- Coverage for Dr. Alex ---  - Dr. HERO Velasco
d/w NP Edmar.  d/w pt's sister.   d/w RN.     --- Coverage for Dr. Alex ---  - Dr. HERO Velasco
Plan for IR thoracentesis.   pulm/ thoracic follow up.   d/w [facility].     --- Coverage for Dr. Alex ---  - Dr. HERO Velasco
d/w NP Edmar.  d/w pt's sister.     --- Coverage for Dr. Alex ---  - Dr. HERO Velasco
Allen Oliveros MD  Hospice and Palliative Care Fellow  Geriatrics and Palliative Care Team  This note and recommendations are not finalized until signed by attending physician.

## 2025-02-04 NOTE — CHART NOTE - NSCHARTNOTEFT_GEN_A_CORE
Pt reported pain is uncontrolled on morphine 5mg q4hr, now agreeable to titrate morphine. Discussed with palliative care can increase to morphine 7.5mg q4hr prn if renal function is okay. Cr normal. calculated GFR 68.   Discussed with Dr Alex increase morphine to 7.5mg q4hr prn

## 2025-02-05 ENCOUNTER — TRANSCRIPTION ENCOUNTER (OUTPATIENT)
Age: 75
End: 2025-02-05

## 2025-02-05 LAB
ANION GAP SERPL CALC-SCNC: 9 MMOL/L — SIGNIFICANT CHANGE UP (ref 7–14)
BUN SERPL-MCNC: 15 MG/DL — SIGNIFICANT CHANGE UP (ref 7–23)
CALCIUM SERPL-MCNC: 9.3 MG/DL — SIGNIFICANT CHANGE UP (ref 8.4–10.5)
CHLORIDE SERPL-SCNC: 101 MMOL/L — SIGNIFICANT CHANGE UP (ref 98–107)
CO2 SERPL-SCNC: 26 MMOL/L — SIGNIFICANT CHANGE UP (ref 22–31)
CREAT SERPL-MCNC: 0.6 MG/DL — SIGNIFICANT CHANGE UP (ref 0.5–1.3)
EGFR: 95 ML/MIN/1.73M2 — SIGNIFICANT CHANGE UP
GLUCOSE SERPL-MCNC: 88 MG/DL — SIGNIFICANT CHANGE UP (ref 70–99)
HCT VFR BLD CALC: 26.9 % — LOW (ref 34.5–45)
HGB BLD-MCNC: 8.2 G/DL — LOW (ref 11.5–15.5)
MAGNESIUM SERPL-MCNC: 2.1 MG/DL — SIGNIFICANT CHANGE UP (ref 1.6–2.6)
MCHC RBC-ENTMCNC: 29.6 PG — SIGNIFICANT CHANGE UP (ref 27–34)
MCHC RBC-ENTMCNC: 30.5 G/DL — LOW (ref 32–36)
MCV RBC AUTO: 97.1 FL — SIGNIFICANT CHANGE UP (ref 80–100)
NRBC # BLD AUTO: 0 K/UL — SIGNIFICANT CHANGE UP (ref 0–0)
NRBC # BLD: 0 /100 WBCS — SIGNIFICANT CHANGE UP (ref 0–0)
NRBC # FLD: 0 K/UL — SIGNIFICANT CHANGE UP (ref 0–0)
NRBC BLD-RTO: 0 /100 WBCS — SIGNIFICANT CHANGE UP (ref 0–0)
PHOSPHATE SERPL-MCNC: 3.5 MG/DL — SIGNIFICANT CHANGE UP (ref 2.5–4.5)
PLATELET # BLD AUTO: 241 K/UL — SIGNIFICANT CHANGE UP (ref 150–400)
POTASSIUM SERPL-MCNC: 4.4 MMOL/L — SIGNIFICANT CHANGE UP (ref 3.5–5.3)
POTASSIUM SERPL-SCNC: 4.4 MMOL/L — SIGNIFICANT CHANGE UP (ref 3.5–5.3)
RBC # BLD: 2.77 M/UL — LOW (ref 3.8–5.2)
RBC # FLD: 17.4 % — HIGH (ref 10.3–14.5)
SODIUM SERPL-SCNC: 136 MMOL/L — SIGNIFICANT CHANGE UP (ref 135–145)
WBC # BLD: 6.92 K/UL — SIGNIFICANT CHANGE UP (ref 3.8–10.5)
WBC # FLD AUTO: 6.92 K/UL — SIGNIFICANT CHANGE UP (ref 3.8–10.5)

## 2025-02-05 PROCEDURE — 71045 X-RAY EXAM CHEST 1 VIEW: CPT | Mod: 26

## 2025-02-05 RX ORDER — ALPRAZOLAM 2 MG
0.25 TABLET ORAL ONCE
Refills: 0 | Status: DISCONTINUED | OUTPATIENT
Start: 2025-02-05 | End: 2025-02-05

## 2025-02-05 RX ORDER — ACETAMINOPHEN 160 MG/5ML
1000 SUSPENSION ORAL ONCE
Refills: 0 | Status: COMPLETED | OUTPATIENT
Start: 2025-02-05 | End: 2025-02-05

## 2025-02-05 RX ADMIN — MORPHINE SULFATE 7.5 MILLIGRAM(S): 60 TABLET, FILM COATED, EXTENDED RELEASE ORAL at 13:25

## 2025-02-05 RX ADMIN — Medication 0.25 MILLIGRAM(S): at 19:39

## 2025-02-05 RX ADMIN — PANTOPRAZOLE 40 MILLIGRAM(S): 20 TABLET, DELAYED RELEASE ORAL at 07:03

## 2025-02-05 RX ADMIN — POLYETHYLENE GLYCOL 3350 17 GRAM(S): 17 POWDER, FOR SOLUTION ORAL at 12:26

## 2025-02-05 RX ADMIN — FAMOTIDINE 20 MILLIGRAM(S): 10 INJECTION INTRAVENOUS at 22:13

## 2025-02-05 RX ADMIN — ACETAMINOPHEN 400 MILLIGRAM(S): 160 SUSPENSION ORAL at 03:38

## 2025-02-05 RX ADMIN — Medication 100 MILLIGRAM(S): at 07:03

## 2025-02-05 RX ADMIN — LIDOCAINE HYDROCHLORIDE 1 PATCH: 30 CREAM TOPICAL at 12:25

## 2025-02-05 RX ADMIN — MORPHINE SULFATE 7.5 MILLIGRAM(S): 60 TABLET, FILM COATED, EXTENDED RELEASE ORAL at 20:45

## 2025-02-05 RX ADMIN — ACETAMINOPHEN 1000 MILLIGRAM(S): 160 SUSPENSION ORAL at 18:30

## 2025-02-05 RX ADMIN — ACETAMINOPHEN 1000 MILLIGRAM(S): 160 SUSPENSION ORAL at 17:30

## 2025-02-05 RX ADMIN — LIDOCAINE HYDROCHLORIDE 1 PATCH: 30 CREAM TOPICAL at 18:25

## 2025-02-05 RX ADMIN — GABAPENTIN 300 MILLIGRAM(S): 800 TABLET ORAL at 09:07

## 2025-02-05 RX ADMIN — Medication 1 DROP(S): at 07:03

## 2025-02-05 RX ADMIN — ENOXAPARIN SODIUM 60 MILLIGRAM(S): 100 INJECTION SUBCUTANEOUS at 07:03

## 2025-02-05 RX ADMIN — ACETAMINOPHEN 1000 MILLIGRAM(S): 160 SUSPENSION ORAL at 10:24

## 2025-02-05 RX ADMIN — GABAPENTIN 200 MILLIGRAM(S): 800 TABLET ORAL at 15:33

## 2025-02-05 RX ADMIN — ACETAMINOPHEN 1000 MILLIGRAM(S): 160 SUSPENSION ORAL at 04:08

## 2025-02-05 RX ADMIN — MORPHINE SULFATE 7.5 MILLIGRAM(S): 60 TABLET, FILM COATED, EXTENDED RELEASE ORAL at 12:25

## 2025-02-05 RX ADMIN — Medication 100 MILLIGRAM(S): at 14:07

## 2025-02-05 RX ADMIN — Medication 1 DROP(S): at 22:13

## 2025-02-05 RX ADMIN — MORPHINE SULFATE 7.5 MILLIGRAM(S): 60 TABLET, FILM COATED, EXTENDED RELEASE ORAL at 19:39

## 2025-02-05 RX ADMIN — GABAPENTIN 300 MILLIGRAM(S): 800 TABLET ORAL at 22:13

## 2025-02-05 RX ADMIN — MORPHINE SULFATE 7.5 MILLIGRAM(S): 60 TABLET, FILM COATED, EXTENDED RELEASE ORAL at 07:31

## 2025-02-05 RX ADMIN — Medication 100 MILLIGRAM(S): at 22:13

## 2025-02-05 RX ADMIN — Medication 1 DROP(S): at 14:07

## 2025-02-05 RX ADMIN — MORPHINE SULFATE 7.5 MILLIGRAM(S): 60 TABLET, FILM COATED, EXTENDED RELEASE ORAL at 07:01

## 2025-02-05 RX ADMIN — ACETAMINOPHEN 1000 MILLIGRAM(S): 160 SUSPENSION ORAL at 11:24

## 2025-02-05 NOTE — DISCHARGE NOTE NURSING/CASE MANAGEMENT/SOCIAL WORK - NSSCCARECORD_GEN_ALL_CORE
[facility]/Durable Medical Equipment Agency/Community [facility]

## 2025-02-05 NOTE — PROGRESS NOTE ADULT - SUBJECTIVE AND OBJECTIVE BOX
Patient is a 74y old  Female who presents with a chief complaint of pleural effusion (04 Feb 2025 14:45)    Patient seen this morning. She is anxious regarding discharge, worried she may not have adequate pain control, or that she may not have all the supplies she needs for home     MEDICATIONS  (STANDING):  artificial tears (preservative free) Ophthalmic Solution 1 Drop(s) Both EYES three times a day  benzonatate 100 milliGRAM(s) Oral three times a day  docusate sodium 100 milliGRAM(s) Oral daily  enoxaparin Injectable 60 milliGRAM(s) SubCutaneous every 24 hours  famotidine    Tablet 20 milliGRAM(s) Oral at bedtime  gabapentin 300 milliGRAM(s) Oral <User Schedule>  gabapentin 200 milliGRAM(s) Oral <User Schedule>  lidocaine   4% Patch 1 Patch Transdermal daily  pantoprazole    Tablet 40 milliGRAM(s) Oral before breakfast  polyethylene glycol 3350 17 Gram(s) Oral daily  sodium chloride 0.9% Solution for Pleural Effusion 30 milliLiter(s) IntraPleural. every 12 hours    MEDICATIONS  (PRN):  acetaminophen     Tablet .. 1000 milliGRAM(s) Oral every 8 hours PRN Mild Pain (1 - 3)  aluminum hydroxide/magnesium hydroxide/simethicone Suspension 30 milliLiter(s) Oral every 4 hours PRN Dyspepsia  guaiFENesin  milliGRAM(s) Oral every 12 hours PRN Cough  melatonin 3 milliGRAM(s) Oral at bedtime PRN Insomnia  morphine   Solution 7.5 milliGRAM(s) Oral every 4 hours PRN Moderate-severe pain  ondansetron   Disintegrating Tablet 4 milliGRAM(s) Oral every 6 hours PRN Nausea and/or Vomiting  ondansetron Injectable 4 milliGRAM(s) IV Push every 8 hours PRN Nausea and/or Vomiting  sodium chloride 0.65% Nasal 1 Spray(s) Both Nostrils four times a day PRN Nasal Congestion        Vital Signs Last 24 Hrs  T(C): 36.6 (05 Feb 2025 09:55), Max: 36.8 (05 Feb 2025 01:30)  T(F): 97.8 (05 Feb 2025 09:55), Max: 98.3 (05 Feb 2025 01:30)  HR: 107 (05 Feb 2025 09:55) (90 - 107)  BP: 120/67 (05 Feb 2025 09:55) (111/56 - 120/67)  BP(mean): --  RR: 19 (05 Feb 2025 09:55) (18 - 19)  SpO2: 100% (05 Feb 2025 09:55) (97% - 100%)    Parameters below as of 05 Feb 2025 09:55  Patient On (Oxygen Delivery Method): nasal cannula        PE  NAD  Awake, alert  Anicteric, MMM  No c/c/e  No rash grossly                            8.2    6.92  )-----------( 241      ( 05 Feb 2025 05:49 )             26.9       02-05    136  |  101  |  15  ----------------------------<  88  4.4   |  26  |  0.60    Ca    9.3      05 Feb 2025 05:49  Phos  3.5     02-05  Mg     2.10     02-05

## 2025-02-05 NOTE — DISCHARGE NOTE NURSING/CASE MANAGEMENT/SOCIAL WORK - FINANCIAL ASSISTANCE
[facility] provides services at a reduced cost to those who are determined to be eligible through [facility]’s financial assistance program. Information regarding [facility]’s financial assistance program can be found by going to [email or web address] or by calling [phone].

## 2025-02-05 NOTE — ADVANCED PRACTICE NURSE CONSULT - RECOMMEDATIONS
Supplies provided:  Convatec Skin barrier ring- item # [id number]  Convatec one piece deep convexity pouch # [id number]  Lawrence One piece convex drainable (1 1/2")- item # [id number]     Please contact Wound/Ostomy Care Service Line if we can be of further assistance (ext [phone]). Please reconsult if changes to tissue type noted.

## 2025-02-05 NOTE — DISCHARGE NOTE NURSING/CASE MANAGEMENT/SOCIAL WORK - NSDCPEFALRISK_GEN_ALL_CORE
For information on Fall & Injury Prevention, visit: [email or web address] OR  [email or web address] OR  https://www.cdc.gov/steadi/patient.html

## 2025-02-05 NOTE — ADVANCED PRACTICE NURSE CONSULT - APN SPECIALTY LIST
Wound Ostomy Care CXR negative - No pneumothorax, No opacities, No free air (1) body pink, extremities blue

## 2025-02-05 NOTE — ADVANCED PRACTICE NURSE CONSULT - REASON FOR CONSULT
Patient seen for ostomy management. Patient is a 74F PMH of rectal cancer w/ local pelvic recurrence, metastases to lungs/bones, s/p FOLFOX (10/21/24), PE and DVT on lovenox, initially presented to OSH for R-sided chest pain, subsequently found to have large R pleural effusion and transferred to [facility] for further management.

## 2025-02-05 NOTE — DISCHARGE NOTE NURSING/CASE MANAGEMENT/SOCIAL WORK - PATIENT PORTAL LINK FT
Pt/parent declined 48hr nurse callback upon discharge.  Pt/parent verbalized understanding re: discharge instructions, medication, and f/u information.  Urgent Care phone number provided to patient if questions arise.    
You can access the FollowMyHealth Patient Portal offered by [facility] by registering at the following website: [email or web address]. By joining [employer]’s FollowMyHealth portal, you will also be able to view your health information using other applications (apps) compatible with our system.

## 2025-02-05 NOTE — DISCHARGE NOTE NURSING/CASE MANAGEMENT/SOCIAL WORK - NSDCVIVACCINE_GEN_ALL_CORE_FT
influenza, injectable, quadrivalent, preservative free; 18-Nov-2020 13:27; Belle Santoyo (RN); [employer]; [id number] (Exp. Date: 30-Jun-2021); IntraMuscular; Deltoid Left.; 0.5 milliLiter(s); VIS (VIS Published: 15-Aug-2019, VIS Presented: 18-Nov-2020);

## 2025-02-05 NOTE — ADVANCED PRACTICE NURSE CONSULT - ASSESSMENT
Patient a&ox4, reports she manages her ostomy independently, has tried many brands and currently uses Laura 1 piece UltraMAx deep convexity pouch # [id number]. LUQ stoma pink and moist, 1 1/8", patient denies leaking or skin irritation. Laura brand pouches not available in facility, provided patient samples of 1 piece deep convexity pouches from [facility], provided contact information for ostomy support programs. All questions answered.

## 2025-02-05 NOTE — PROGRESS NOTE ADULT - SUBJECTIVE AND OBJECTIVE BOX
Name of Patient : TAHIR PENA  MRN: [id number]  Date of visit: 02-05-25       Subjective: Patient seen and examined. No new events except as noted.   doing okay       REVIEW OF SYSTEMS:    CONSTITUTIONAL: No weakness, fevers or chills  EYES/ENT: No visual changes;  No vertigo or throat pain   NECK: No pain or stiffness  RESPIRATORY: No cough, wheezing, hemoptysis; No shortness of breath  CARDIOVASCULAR: No chest pain or palpitations  GASTROINTESTINAL: No abdominal or epigastric pain. No nausea, vomiting, or hematemesis; No diarrhea or constipation. No melena or hematochezia.  GENITOURINARY: No dysuria, frequency or hematuria  NEUROLOGICAL: No numbness or weakness  SKIN: No itching, burning, rashes, or lesions   All other review of systems is negative unless indicated above.    MEDICATIONS:  MEDICATIONS  (STANDING):  artificial tears (preservative free) Ophthalmic Solution 1 Drop(s) Both EYES three times a day  benzonatate 100 milliGRAM(s) Oral three times a day  docusate sodium 100 milliGRAM(s) Oral daily  enoxaparin Injectable 60 milliGRAM(s) SubCutaneous every 24 hours  famotidine    Tablet 20 milliGRAM(s) Oral at bedtime  gabapentin 300 milliGRAM(s) Oral <User Schedule>  gabapentin 200 milliGRAM(s) Oral <User Schedule>  lidocaine   4% Patch 1 Patch Transdermal daily  pantoprazole    Tablet 40 milliGRAM(s) Oral before breakfast  polyethylene glycol 3350 17 Gram(s) Oral daily  sodium chloride 0.9% Solution for Pleural Effusion 30 milliLiter(s) IntraPleural. every 12 hours      PHYSICAL EXAM:  T(C): 36.6 (02-05-25 @ 09:55), Max: 36.8 (02-05-25 @ 01:30)  HR: 107 (02-05-25 @ 09:55) (90 - 107)  BP: 120/67 (02-05-25 @ 09:55) (111/56 - 120/67)  RR: 19 (02-05-25 @ 09:55) (18 - 19)  SpO2: 100% (02-05-25 @ 09:55) (97% - 100%)  Wt(kg): --  I&O's Summary    04 Feb 2025 07:01  -  05 Feb 2025 07:00  --------------------------------------------------------  IN: 0 mL / OUT: 350 mL / NET: -350 mL          Appearance: Normal	  HEENT:  PERRLA   Lymphatic: No lymphadenopathy   Cardiovascular: Normal S1 S2, no JVD  Respiratory: normal effort , clear  Gastrointestinal:  Soft, Non-tender  Skin: No rashes,  warm to touch  Psychiatry:  Mood & affect appropriate  Musculuskeletal: No edema    recent labs, Imaging and EKGs personally reviewed   CODE status discussed with the patient in detail    02-04-25 @ 07:01  -  02-05-25 @ 07:00  --------------------------------------------------------  IN: 0 mL / OUT: 350 mL / NET: -350 mL                          8.2    6.92  )-----------( 241      ( 05 Feb 2025 05:49 )             26.9               02-05    136  |  101  |  15  ----------------------------<  88  4.4   |  26  |  0.60    Ca    9.3      05 Feb 2025 05:49  Phos  3.5     02-05  Mg     2.10     02-05                         Urinalysis Basic - ( 05 Feb 2025 05:49 )    Color: x / Appearance: x / SG: x / pH: x  Gluc: 88 mg/dL / Ketone: x  / Bili: x / Urobili: x   Blood: x / Protein: x / Nitrite: x   Leuk Esterase: x / RBC: x / WBC x   Sq Epi: x / Non Sq Epi: x / Bacteria: x

## 2025-02-05 NOTE — PROGRESS NOTE ADULT - ASSESSMENT
75 y/o F with history of recurrent rectal cancer admitted for dyspnea with right pleural effusion.     1. Recurrent metastatic rectal cancer  ·	Recent progression on FOLFOX with plan to switch to Lonsurf +/- bevacizumab  ·	s/p palliative radiation therapy to the right rib area as well as spine.    ·	No disease directed therapy as inpatient  ·	Patient to follow with Dr. Zoe Goldberg at [facility] [place] [place]  ·	Cont to optimize pain control    2.  Dyspnea 2/2 pleural effusion  ·	CT surgery following, s/p chest tube now removed, s/p MIST x 6  ·	Supplemental O2 as needed, planning for home O2    3. Acute b/l LE DVT  ·	Doppler with acute deep venous thrombosis: above and below the knee. Extensive DVT throughout the RIGHT lower extremity. DVT in the LEFT femoral vein.  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 Will continue to follow.    Shreya Aguiar PA-C  Hematology/Oncology  [facility]  [phone] (office)  [phone] (alt office)  Evenings and weekends please call MD on call or office

## 2025-02-05 NOTE — DISCHARGE NOTE NURSING/CASE MANAGEMENT/SOCIAL WORK - NSDCFUADDAPPT_GEN_ALL_CORE_FT
APPTS ARE READY TO BE MADE: [X] YES    Best Family or Patient Contact (if needed):    Additional Information about above appointments (if needed):    1: oncologist/primary care provider Dr Goldberg, Zoe   2: Dr Sultana  3:     Other comments or requests:

## 2025-02-05 NOTE — DISCHARGE NOTE NURSING/CASE MANAGEMENT/SOCIAL WORK - NSSCTYPOFSERV_GEN_ALL_CORE
pt known to kitty rn, pt. ot. hha/ Pt also has pvt hire hha with elder care agency soc 2/6/25/ family to also assist ast home

## 2025-02-06 VITALS
OXYGEN SATURATION: 97 % | HEART RATE: 97 BPM | TEMPERATURE: 98 F | RESPIRATION RATE: 16 BRPM | SYSTOLIC BLOOD PRESSURE: 100 MMHG | DIASTOLIC BLOOD PRESSURE: 54 MMHG

## 2025-02-06 LAB
ANION GAP SERPL CALC-SCNC: 12 MMOL/L — SIGNIFICANT CHANGE UP (ref 7–14)
BUN SERPL-MCNC: 17 MG/DL — SIGNIFICANT CHANGE UP (ref 7–23)
CALCIUM SERPL-MCNC: 9.3 MG/DL — SIGNIFICANT CHANGE UP (ref 8.4–10.5)
CHLORIDE SERPL-SCNC: 98 MMOL/L — SIGNIFICANT CHANGE UP (ref 98–107)
CO2 SERPL-SCNC: 25 MMOL/L — SIGNIFICANT CHANGE UP (ref 22–31)
CREAT SERPL-MCNC: 0.58 MG/DL — SIGNIFICANT CHANGE UP (ref 0.5–1.3)
EGFR: 96 ML/MIN/1.73M2 — SIGNIFICANT CHANGE UP
GLUCOSE SERPL-MCNC: 91 MG/DL — SIGNIFICANT CHANGE UP (ref 70–99)
HCT VFR BLD CALC: 26.2 % — LOW (ref 34.5–45)
HGB BLD-MCNC: 8 G/DL — LOW (ref 11.5–15.5)
MAGNESIUM SERPL-MCNC: 2.1 MG/DL — SIGNIFICANT CHANGE UP (ref 1.6–2.6)
MCHC RBC-ENTMCNC: 29.4 PG — SIGNIFICANT CHANGE UP (ref 27–34)
MCHC RBC-ENTMCNC: 30.5 G/DL — LOW (ref 32–36)
MCV RBC AUTO: 96.3 FL — SIGNIFICANT CHANGE UP (ref 80–100)
NRBC # BLD AUTO: 0 K/UL — SIGNIFICANT CHANGE UP (ref 0–0)
NRBC # BLD: 0 /100 WBCS — SIGNIFICANT CHANGE UP (ref 0–0)
NRBC # FLD: 0 K/UL — SIGNIFICANT CHANGE UP (ref 0–0)
NRBC BLD-RTO: 0 /100 WBCS — SIGNIFICANT CHANGE UP (ref 0–0)
PHOSPHATE SERPL-MCNC: 3.7 MG/DL — SIGNIFICANT CHANGE UP (ref 2.5–4.5)
PLATELET # BLD AUTO: 250 K/UL — SIGNIFICANT CHANGE UP (ref 150–400)
POTASSIUM SERPL-MCNC: 4.5 MMOL/L — SIGNIFICANT CHANGE UP (ref 3.5–5.3)
POTASSIUM SERPL-SCNC: 4.5 MMOL/L — SIGNIFICANT CHANGE UP (ref 3.5–5.3)
RBC # BLD: 2.72 M/UL — LOW (ref 3.8–5.2)
RBC # FLD: 17.5 % — HIGH (ref 10.3–14.5)
SODIUM SERPL-SCNC: 135 MMOL/L — SIGNIFICANT CHANGE UP (ref 135–145)
WBC # BLD: 6.91 K/UL — SIGNIFICANT CHANGE UP (ref 3.8–10.5)
WBC # FLD AUTO: 6.91 K/UL — SIGNIFICANT CHANGE UP (ref 3.8–10.5)

## 2025-02-06 RX ORDER — NALOXONE HYDROCHLORIDE 3 MG/.1ML
1 SPRAY NASAL
Qty: 1 | Refills: 0
Start: 2025-02-06 | End: 2025-03-07

## 2025-02-06 RX ORDER — ALPRAZOLAM 2 MG
0.25 TABLET ORAL DAILY
Refills: 0 | Status: DISCONTINUED | OUTPATIENT
Start: 2025-02-06 | End: 2025-02-06

## 2025-02-06 RX ORDER — POLYETHYLENE GLYCOL 3350 17 G/17G
17 POWDER, FOR SOLUTION ORAL
Qty: 0 | Refills: 0 | DISCHARGE
Start: 2025-02-06

## 2025-02-06 RX ORDER — ACETAMINOPHEN 160 MG/5ML
1000 SUSPENSION ORAL ONCE
Refills: 0 | Status: COMPLETED | OUTPATIENT
Start: 2025-02-06 | End: 2025-02-06

## 2025-02-06 RX ORDER — ALPRAZOLAM 2 MG
1 TABLET ORAL
Qty: 7 | Refills: 0
Start: 2025-02-06 | End: 2025-02-12

## 2025-02-06 RX ORDER — GABAPENTIN 800 MG/1
1 TABLET ORAL
Qty: 60 | Refills: 0
Start: 2025-02-06 | End: 2025-03-07

## 2025-02-06 RX ORDER — DOCUSATE SODIUM 100 MG
1 CAPSULE ORAL
Qty: 0 | Refills: 0 | DISCHARGE
Start: 2025-02-06

## 2025-02-06 RX ORDER — GABAPENTIN 800 MG/1
2 TABLET ORAL
Qty: 60 | Refills: 0
Start: 2025-02-06 | End: 2025-03-07

## 2025-02-06 RX ADMIN — GABAPENTIN 200 MILLIGRAM(S): 800 TABLET ORAL at 15:19

## 2025-02-06 RX ADMIN — ACETAMINOPHEN 1000 MILLIGRAM(S): 160 SUSPENSION ORAL at 03:00

## 2025-02-06 RX ADMIN — LIDOCAINE HYDROCHLORIDE 1 PATCH: 30 CREAM TOPICAL at 11:28

## 2025-02-06 RX ADMIN — Medication 0.25 MILLIGRAM(S): at 12:01

## 2025-02-06 RX ADMIN — Medication 100 MILLIGRAM(S): at 07:44

## 2025-02-06 RX ADMIN — MORPHINE SULFATE 7.5 MILLIGRAM(S): 60 TABLET, FILM COATED, EXTENDED RELEASE ORAL at 08:30

## 2025-02-06 RX ADMIN — Medication 1 DROP(S): at 07:45

## 2025-02-06 RX ADMIN — Medication 1 DROP(S): at 15:01

## 2025-02-06 RX ADMIN — Medication 100 MILLIGRAM(S): at 15:01

## 2025-02-06 RX ADMIN — GABAPENTIN 300 MILLIGRAM(S): 800 TABLET ORAL at 09:51

## 2025-02-06 RX ADMIN — LIDOCAINE HYDROCHLORIDE 1 PATCH: 30 CREAM TOPICAL at 00:04

## 2025-02-06 RX ADMIN — MORPHINE SULFATE 7.5 MILLIGRAM(S): 60 TABLET, FILM COATED, EXTENDED RELEASE ORAL at 15:01

## 2025-02-06 RX ADMIN — MORPHINE SULFATE 7.5 MILLIGRAM(S): 60 TABLET, FILM COATED, EXTENDED RELEASE ORAL at 07:49

## 2025-02-06 RX ADMIN — ENOXAPARIN SODIUM 60 MILLIGRAM(S): 100 INJECTION SUBCUTANEOUS at 07:45

## 2025-02-06 RX ADMIN — PANTOPRAZOLE 40 MILLIGRAM(S): 20 TABLET, DELAYED RELEASE ORAL at 07:45

## 2025-02-06 RX ADMIN — ACETAMINOPHEN 400 MILLIGRAM(S): 160 SUSPENSION ORAL at 02:16

## 2025-02-06 RX ADMIN — ACETAMINOPHEN 1000 MILLIGRAM(S): 160 SUSPENSION ORAL at 09:48

## 2025-02-06 NOTE — PROGRESS NOTE ADULT - PROBLEM SELECTOR PROBLEM 3
Pulmonary embolism
Pulmonary embolism
Colorectal cancer
Pulmonary embolism
Colorectal cancer
Pulmonary embolism
Pulmonary embolism
Colorectal cancer
Pulmonary embolism
Colorectal cancer
Colorectal cancer
Pulmonary embolism
Colorectal cancer
Pulmonary embolism
Pulmonary embolism
Colorectal cancer
Colorectal cancer
Cancer related pain
Colorectal cancer
Colorectal cancer
Cancer related pain

## 2025-02-06 NOTE — PROGRESS NOTE ADULT - PROVIDER SPECIALTY LIST ADULT
CT Surgery
Heme/Onc
Internal Medicine
Heme/Onc
Podiatry
Thoracic Surgery
Thoracic Surgery
Vascular Surgery
CT Surgery
CT Surgery
Heme/Onc
Internal Medicine
Internal Medicine
Thoracic Surgery
Thoracic Surgery
Heme/Onc
Internal Medicine
Vascular Surgery
Internal Medicine
Palliative Care
Internal Medicine
Palliative Care
Internal Medicine

## 2025-02-06 NOTE — PROGRESS NOTE ADULT - PROBLEM SELECTOR PLAN 1
Presumed malignant in setting of lung metastasis.   - Appreciate Thoracic Surgery and IR consults  - Will see if thoracentesis can be expedited as pt needs to be back home as soon as feasible to receive clinical trial medication  - While pt is immunocompromised, there is no strong evidence she has PNA or other acute infection. If pt develops fever or worsening respiratory or other localizing symptoms can resume antibiotics.
- Outpatient oncology: [facility] Dr. Zoe Goldberg, MD  - oncology following: recent progression on FOLFOX, planning to switch to lonsurf and bevacizumab. No disease directed therapy as inpatient. Patient to follow with [facility] upon discharge.
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
Presumed malignant in setting of lung metastasis.   - Appreciate Thoracic Surgery and IR consults  - Will see if thoracentesis can be expedited as pt needs to be back home as soon as feasible to receive clinical trial medication  - While pt is immunocompromised, there is no strong evidence she has PNA or other acute infection. If pt develops fever or worsening respiratory or other localizing symptoms can resume antibiotics.
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
Presumed malignant in setting of lung metastasis.   - Appreciate Thoracic Surgery and IR consults  - Will see if thoracentesis can be expedited as pt needs to be back home as soon as feasible to receive clinical trial medication. pulm and thoracic unable to do. IR consulted.   - While pt is immunocompromised, there is no strong evidence she has PNA or other acute infection. If pt develops fever or worsening respiratory or other localizing symptoms can resume antibiotics.  - pt s/p chest tube by IR 1/21/25: 350 cc fluid removed, MIST tx per thoracic
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
Presumed malignant in setting of lung metastasis.   - Appreciate Thoracic Surgery and IR consults  - Will see if thoracentesis can be expedited as pt needs to be back home as soon as feasible to receive clinical trial medication. pulm and thoracic unable to do. IR consulted.   - While pt is immunocompromised, there is no strong evidence she has PNA or other acute infection. If pt develops fever or worsening respiratory or other localizing symptoms can resume antibiotics.  - pt s/p chest tube by IR 1/21/25: 350 cc fluid removed, MIST tx per thoracic
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 high ri if vaginalbleeding on BID dose
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
Presumed malignant in setting of lung metastasis.   - Appreciate Thoracic Surgery and IR consults  - While pt is immunocompromised, there is no strong evidence she has PNA or other acute infection. If pt develops fever or worsening respiratory or other localizing symptoms can resume antibiotics.  - pulm and thoracic unable to do. IR consulted.   - pt s/p chest tube by IR 1/21/25: 350 cc fluid removed, MIST tx per surgery  - apical PTX, chest tube management per CT surgery.
Presumed malignant in setting of lung metastasis.   - Appreciate Thoracic Surgery and IR consults  - Will see if thoracentesis can be expedited as pt needs to be back home as soon as feasible to receive clinical trial medication  - While pt is immunocompromised, there is no strong evidence she has PNA or other acute infection. If pt develops fever or worsening respiratory or other localizing symptoms can resume antibiotics.
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
- Patient with recurrent metastatic rectal cancer s/p colostomy s/p palliative radiation therapy to the right rib area as well as spine with recent progression on Folfox.    - CT Chest 1/17 - New and increased lytic masses involving right-sided ribs . Stable lytic metastasis of the right posterior elements at T12.Bilateral pulmonary nodules slightly increased in size.Left adrenal nodule, new from prior study.  - Follows with [facility]   - Oncology recommendations appreciated - planning to switch to lonsurf and bevacizumab outpatient  Patient interested in continuing DMT
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

## 2025-02-06 NOTE — PROGRESS NOTE ADULT - ASSESSMENT
75 y/o F with history of recurrent rectal cancer admitted for dyspnea with right pleural effusion.     1. Recurrent metastatic rectal cancer  ·	Recent progression on FOLFOX with plan to switch to Lonsurf +/- bevacizumab  ·	s/p palliative radiation therapy to the right rib area as well as spine.    ·	No disease directed therapy as inpatient  ·	Patient to follow with Dr. Zoe Goldberg at [facility] [place] upon discharge  ·	Cont to optimize pain control    2.  Dyspnea 2/2 pleural effusion  ·	CT surgery following, s/p chest tube now removed, s/p MIST x 6  ·	Supplemental O2 as needed, planning for home O2    3. Acute b/l LE DVT  ·	Doppler with acute deep venous thrombosis: above and below the knee. Extensive DVT throughout the RIGHT lower extremity. DVT in the LEFT femoral vein.  ·	Vascular following, s/p IVC filter placement 01/30  ·	Cont with Lovenox, currently on 60mg daily dose due to high risk of bleeding from fistula     Discharge planning to home today with home O2/home services, with pain management in plan. Will continue to follow.    Rachel Rosado NP  Hematology/Oncology  [facility]  [phone] (office)

## 2025-02-06 NOTE — PROGRESS NOTE ADULT - PROBLEM SELECTOR PROBLEM 2
Colorectal cancer
Pleural effusion, right
Colorectal cancer
Pleural effusion, right
Pleural effusion, right
Colorectal cancer
Pleural effusion, right
Pleural effusion, right
Colorectal cancer
Pleural effusion, right
Colorectal cancer
Pleural effusion, right
Colorectal cancer
Pleural effusion, right

## 2025-02-06 NOTE — PROGRESS NOTE ADULT - PROBLEM SELECTOR PLAN 4
- Cancer-associated.  - Now with DVT B/L LE
- Cancer-associated.  - Now with DVT B/L LE  - S/P IVC filter   - lovenox 60 daily dose, monitor
- Cancer-associated.  - Now with DVT B/L LE  - S/P IVC filter   - lovenox 60 daily dose, monitor
- Again discussed HCP and advanced directives, however pt declines completing  - CODE STATUS: FULL
- Cancer-associated.  - Now with DVT B/L LE  - S/P IVC filter   - lovenox 60 daily dose, monitor
- Cancer-associated.  - Now with DVT B/L LE  - S/P IVC filter   - lovenox 60 daily dose, monitor        D/C planing  pain control   Home O2
- Cancer-associated.  - Now with DVT B/L LE
- Cancer-associated.  - Now with DVT B/L LE
- Cancer-associated.  - Now with DVT B/L LE  - S/P IVC filter   - lovenox 60 daily dose, monitor        D/C planing  pain control   Home O2
Case reviewed with primary team   Thank you for allowing us to participate in your patient's care. Please page [phone] for any questions/concerns.

## 2025-02-06 NOTE — PROGRESS NOTE ADULT - SUBJECTIVE AND OBJECTIVE BOX
Patient is a 74y old  Female who presents with a chief complaint of pleural effusion (05 Feb 2025 16:46)      MEDICATIONS  (STANDING):  ALPRAZolam 0.25 milliGRAM(s) Oral daily  artificial tears (preservative free) Ophthalmic Solution 1 Drop(s) Both EYES three times a day  benzonatate 100 milliGRAM(s) Oral three times a day  docusate sodium 100 milliGRAM(s) Oral daily  enoxaparin Injectable 60 milliGRAM(s) SubCutaneous every 24 hours  famotidine    Tablet 20 milliGRAM(s) Oral at bedtime  gabapentin 300 milliGRAM(s) Oral <User Schedule>  gabapentin 200 milliGRAM(s) Oral <User Schedule>  lidocaine   4% Patch 1 Patch Transdermal daily  pantoprazole    Tablet 40 milliGRAM(s) Oral before breakfast  polyethylene glycol 3350 17 Gram(s) Oral daily  sodium chloride 0.9% Solution for Pleural Effusion 30 milliLiter(s) IntraPleural. every 12 hours    MEDICATIONS  (PRN):  acetaminophen     Tablet .. 1000 milliGRAM(s) Oral every 8 hours PRN Mild Pain (1 - 3)  aluminum hydroxide/magnesium hydroxide/simethicone Suspension 30 milliLiter(s) Oral every 4 hours PRN Dyspepsia  guaiFENesin  milliGRAM(s) Oral every 12 hours PRN Cough  melatonin 3 milliGRAM(s) Oral at bedtime PRN Insomnia  morphine   Solution 7.5 milliGRAM(s) Oral every 4 hours PRN Moderate-severe pain  ondansetron   Disintegrating Tablet 4 milliGRAM(s) Oral every 6 hours PRN Nausea and/or Vomiting  ondansetron Injectable 4 milliGRAM(s) IV Push every 8 hours PRN Nausea and/or Vomiting  sodium chloride 0.65% Nasal 1 Spray(s) Both Nostrils four times a day PRN Nasal Congestion      Vital Signs Last 24 Hrs  T(C): 36.5 (06 Feb 2025 11:10), Max: 37.5 (05 Feb 2025 17:36)  T(F): 97.7 (06 Feb 2025 11:10), Max: 99.5 (05 Feb 2025 17:36)  HR: 97 (06 Feb 2025 11:10) (88 - 97)  BP: 100/54 (06 Feb 2025 11:10) (100/54 - 125/67)  BP(mean): --  RR: 16 (06 Feb 2025 11:10) (16 - 18)  SpO2: 97% (06 Feb 2025 11:10) (97% - 98%)    Parameters below as of 06 Feb 2025 11:10  Patient On (Oxygen Delivery Method): nasal cannula    PE  NAD  Awake, alert  Anicteric, MMM  RRR  CTAB  Abd soft, NT, ND                          8.0    6.91  )-----------( 250      ( 06 Feb 2025 05:20 )             26.2       02-06    135  |  98  |  17  ----------------------------<  91  4.5   |  25  |  0.58    Ca    9.3      06 Feb 2025 05:20  Phos  3.7     02-06  Mg     2.10     02-06

## 2025-02-06 NOTE — PROGRESS NOTE ADULT - NS ATTEND AMEND GEN_ALL_CORE FT
Patient seen and examined with the NP during rounds  I agree with her assessment and plan  c/o Poorly controlled pain, requesting IV tylenol in am  continue supportive care
Patient seen and examined with the NP during rounds  I agree with her assessment and plan  continue AC given Acute DVT, Appreciate CT surgery follow up  pain control ongoing
optimize pain.  monitor for bleeding on lovenox.
Agree with above assessment and plan.   74 year old female with metastatic CRC POD on FOLFOX plan for Lonsurf and Tanvi here with pleural effusion s/p thoracentesis, pleurX placement. MIST tx today for fibrinolysis #4. Pain management. Follow up at [facility] after discharge. No plan for treatment inpatient. Follow up at [facility] after discharge.
Agree with above assessment and plan.   74 year old female with metastatic CRC POD on FOLFOX plan for Lonsurf and Tanvi here with pleural effusion s/p thoracentesis, pleurX placement. MIST tx today for fibrinolysis. Pain management. [place] [facility] [place]. No plan for treatment.
Agree with above assessment and plan.   74 year old female with metastatic rectal cancer progressed on FOLFOX, plan for lonsurf and bevacizumab here with pleural effusion, malignant. s/p MIST treatments x6. Improvement in symptoms. Acute b/l LE DVT, plan for IVC filter and on lovenox 50mg BID.   Follow up at [facility] after discharge.
Patient seen and seen with the PA during rounds  I agree with her assessment and plan
Patient was seen and examined this morning on rounds with PA.  Please see her note for details.  I agree with the assessment and plan.  Recurrent metastatic rectal cancer most recently on Lonsurf and bevacizumab.  Admitted with symptomatic pleural effusions and right middle lobe atelectasis.  IVC filter placed for bilateral lower extremity DVT.  Currently on Lovenox.  Patient wishes to be on once daily dosing Lovenox 1.5 mg q.d. upon discharge.  Pain better controlled.  Discharge planning underway
agree with above
75 y/o F with stage IV rectal cancer progressed on FOLFOX, plan for lonsurf and bevacizumab here with malignant pleural effusion. S/p CT and MIST treatments with symptomatic improvement. Course c/b acute b/l LE DVT, s/p IVCF placement due to high risk of bleeding from colovesicular fistula. Currently on lovenox 60mg daily. D/c planning. Follow up at [facility] after discharge.
Agree with above assessment and plan.
Patient seen and examined with the NP during rounds  I agree with her assessment and plan
s/p pleurex cath. control pain.
Agree with above assessment and plan.   74 year old female with metastatic rectal cancer progressed on FOLFOX, plan for lonsurf and bevacizumab here with pleural effusion, malignant. s/p MIST treatments x6. Improvement in symptoms. Acute b/l LE DVT, plan for IVC filter and on lovenox 50mg BID.   [place] [facility] [place]

## 2025-02-06 NOTE — CHART NOTE - NSCHARTNOTEFT_GEN_A_CORE
Reference #: [id number]    A	Chloe Fajardoo	1950	Female	[address] [facility]	[id number]  B	Chloe BalMicky	1950	Female	[address] [facility]	[id number]    Prescription Information      PDI Filter:    PDI	My Rx	Current Rx	Drug Type	Rx Written	Rx Dispensed	Drug	Quantity	Days Supply	Prescriber Name	Prescriber ERICA #	Payment Method	Dispenser  A	N	N	O	01/02/2025	01/02/2025	tramadol hcl 50 mg tablet	112	14	[facility]	[id number]	Insurance	[facility]  A	N	N	O	04/05/2024	04/05/2024	tramadol hcl 50 mg tablet	20	5	Zoie Neely	[id number]	Insurance	[facility]  B	N	N	O	11/20/2024	11/22/2024	tramadol hcl 50 mg tablet	180	30	Ruth Noe)	[id number]	Medicare	[id number]  B	N	N	O	07/11/2024	07/12/2024	tramadol hcl 50 mg tablet	45	15	Goldberg, Zoe MD	[id number]	Medicare	[id number]

## 2025-02-06 NOTE — PROGRESS NOTE ADULT - PROBLEM SELECTOR PROBLEM 4
Pulmonary embolism
Palliative care encounter
Pulmonary embolism
Advanced care planning/counseling discussion

## 2025-02-06 NOTE — PROGRESS NOTE ADULT - REASON FOR ADMISSION
pleural effusion

## 2025-02-06 NOTE — PROGRESS NOTE ADULT - PROBLEM SELECTOR PLAN 3
Metastatic, in process of transitioning chemotherapy regimens.  - Appreciate Heme/Onc consult  - Outpatient treatment through [facility] [place]  - d/c tramadol 100mg TID --> switch to oral liquid morphine.   - Continue tylenol TID  - Continue lidoderm to right upper back  - Pt states she now takes gabapentin 300mg qAM, 200mg midday, and 300mg qPM; will adjust orders  - Will provide bowel regimen and PPI/H2-blocker per home regimen  - palliative eval for pain management appreciated.
Cancer-associated.  - he is on lovenox 60mg daily at home.   - currently holding lovenox for 24 hours post thoracentesis and chst tube.   - resume lovenox vs. hep gtt once okay by thoracic after MIST treatments.  (discussed with thoracic, okay to start Hep SQ but not systemic gtt while MIST still on going)  discussed with pt, risks/benefits explained.    Dispo: chest tube and MIST treatments pending  home after chest tube come out.  pain control.
Cancer-associated.  - he is on lovenox 60mg daily at home.   - currently holding lovenox for 24 hours post thoracentesis and chst tube.   - resume lovenox vs. hep gtt once okay by thoracic after MIST treatments.  (discussed with thoracic, okay to start Hep SQ but not systemic gtt while MIST still on going)  discussed with pt, risks/benefits explained.    Dispo: chest tube and MIST treatments per TS   home after chest tube come out.  pain control.
Metastatic, in process of transitioning chemotherapy regimens.  - Appreciate Heme/Onc consult  - Outpatient treatment through [facility] [place]  - d/c tramadol 100mg TID --> switch to oral liquid morphine.   - Continue tylenol TID  - Continue lidoderm to right upper back  - Pt states she now takes gabapentin 300mg qAM, 200mg midday, and 300mg qPM; will adjust orders  - Will provide bowel regimen and PPI/H2-blocker per home regimen  - palliative eval for pain management appreciated.
Metastatic, in process of transitioning chemotherapy regimens.  - Appreciate Heme/Onc consult  - [place] [facility] [place]/c tramadol 100mg TID --> switch to oral liquid morphine.   - Continue tylenol TID  - Continue lidoderm to right upper back  - Pt states she now takes gabapentin 300mg qAM, 200mg midday, and 300mg qPM; will adjust orders  - Will provide bowel regimen and PPI/H2-blocker per home regimen  - palliative eval for pain management appreciated.
- Home meds: tylenol 1000mg q8h, Tramadol 50mg q8h, aleve PRN breakthrough pain  - 1/27 - Patient expresses concern of not being able to obtain her pain medications when she needs it and provided an example that she was unable to receive her po morphine solution 5mg when having 8/10 pain and states she was informed her pain needed to be a different level to receive her pain medication. Reviewed that currently, her pain regimen is po morphine 5mg prn for moderate pain and po morphine 10mg prn for severe pain and as such RN likely offered higher dose when she had 8/10 pain; patient reports not wanting to take higher dose of morphine and only wants to take po morphine 5mg instead or IV Tylenol. Also reviewed that per chart review, she only required morphine prn doses x2 and encouraged her to ask for prn doses when she is having pain at pain onset to prevent escalation to severe pain.   - PO Tylenol 1000mg q8 ATC; patient may refuse doses; do not exceed 3g of acetaminophen per day   - Continue home regimen of PO Gabapentin 300mg, 200mg, 300mg   - Discontinue PO Morphine Solution 10mg PRN as per patient preference.   - Switch to PO Morphine Solution 5mg q4 PRN moderate-severe pain (hold for hypotension, oversedation, respiratory depression)  - Educated/ counseled patient about pain regimen. Encouraged her to take prn doses at pain onset (rather than waiting till pain is severe)   - Bowel regimen while on opiates: pt refusing senna; miralax qd  - [place] with [place]. Hermilo Noe at [facility] [place]
Metastatic, in process of transitioning chemotherapy regimens.  - Appreciate Heme/Onc consult  - [place] [facility] [place]  - d/c tramadol 100mg TID --> switch to oral liquid morphine.   - Continue tylenol TID  - Continue lidoderm to right upper back  - Pt states she now takes gabapentin 300mg qAM, 200mg midday, and 300mg qPM; will adjust orders  - Will provide bowel regimen and PPI/H2-blocker per home regimen  - palliative eval for pain management appreciated.
Metastatic, in process of transitioning chemotherapy regimens.  - Appreciate Heme/Onc consult  - [place] [facility] [place]  - d/c tramadol 100mg TID --> switch to oral liquid morphine.   - Continue tylenol TID  - Continue lidoderm to right upper back  - Pt states she now takes gabapentin 300mg qAM, 200mg midday, and 300mg qPM; will adjust orders  - Will provide bowel regimen and PPI/H2-blocker per home regimen  - palliative eval for pain management appreciated.
Cancer-associated.  - Continue lovenox 60mg daily, which is slightly less than 1.5mg/kg daily but consistent with home regimen  - Will need accurate weight to see if dose needs adjustment; ideally, pt would be agreeable to 1mg/kg BID for more reliable blood concentration  - Will need to hold lovenox for 24 hours before thoracentesis
Cancer-associated.  - Continue lovenox 60mg daily, which is slightly less than 1.5mg/kg daily but consistent with home regimen  - Will need accurate weight to see if dose needs adjustment; ideally, pt would be agreeable to 1mg/kg BID for more reliable blood concentration  - Will need to hold lovenox for 24 hours before thoracentesis  - resume lovenox vs. hep gtt once okay by thoracic.  (discussed with thoracic, okay to start Hep SQ but not systemic gtt while MIST still on going)  discussed with pt, risks/benefits explained.
Metastatic, in process of transitioning chemotherapy regimens.  - Appreciate Heme/Onc consult  - [place] through [facility] [place]  - d/c tramadol 100mg TID --> switch to oral liquid morphine.   - Continue tylenol TID  - Continue lidoderm to right upper back  - Pt states she now takes gabapentin 300mg qAM, 200mg midday, and 300mg qPM; will adjust orders  - Will provide bowel regimen and PPI/H2-blocker per home regimen  - palliative eval for pain management appreciated.
Cancer-associated.  - he is on lovenox 60mg daily at home.   - currently holding lovenox for 24 hours post thoracentesis and chst tube.   - resume lovenox vs. hep gtt once okay by thoracic after MIST treatments.  (discussed with thoracic, okay to start Hep SQ but not systemic gtt while MIST still on going)  discussed with pt, risks/benefits explained.    Dispo: chest tube and MIST treatments per TS   home after chest tube come out.  pain control.
Metastatic, in process of transitioning chemotherapy regimens.  - Appreciate Heme/Onc consult  - [place] [facility] [place]  - d/c tramadol 100mg TID --> switch to oral liquid morphine.   - Continue tylenol TID  - Continue lidoderm to right upper back  - Pt states she now takes gabapentin 300mg qAM, 200mg midday, and 300mg qPM; will adjust orders  - Will provide bowel regimen and PPI/H2-blocker per home regimen  - palliative eval for pain management appreciated.
Cancer-associated.  - he is on lovenox 60mg daily at home.   - currently holding lovenox for 24 hours post thoracentesis and chst tube.   - resume lovenox vs. hep gtt once okay by thoracic after MIST treatments.  (discussed with thoracic, okay to start Hep SQ but not systemic gtt while MIST still on going)  discussed with pt, risks/benefits explained.    Dispo: chest tube and MIST treatments per TS   home after chest tube come out.  pain control.
Cancer-associated.  - Continue lovenox 60mg daily, which is slightly less than 1.5mg/kg daily but consistent with home regimen  - Will need accurate weight to see if dose needs adjustment; ideally, pt would be agreeable to 1mg/kg BID for more reliable blood concentration  - Will need to hold lovenox for 24 hours before thoracentesis  - resume lovenox vs. hep gtt once okay by thoracic.
Metastatic, in process of transitioning chemotherapy regimens.  - Appreciate Heme/Onc consult  - [place] [facility] [place] d/c tramadol 100mg TID --> switch to oral liquid morphine.   - Continue tylenol TID  - Continue lidoderm to right upper back  - Pt states she now takes gabapentin 300mg qAM, 200mg midday, and 300mg qPM; will adjust orders  - Will provide bowel regimen and PPI/H2-blocker per home regimen  - palliative eval for pain management appreciated.
Cancer-associated.  - Continue lovenox 60mg daily, which is slightly less than 1.5mg/kg daily but consistent with home regimen  - Will need accurate weight to see if dose needs adjustment; ideally, pt would be agreeable to 1mg/kg BID for more reliable blood concentration  - Will need to hold lovenox for 24 hours before thoracentesis
Metastatic, in process of transitioning chemotherapy regimens.  - Appreciate Heme/Onc consult  - [place] [facility] [place] d/c tramadol 100mg TID --> switch to oral liquid morphine.   - Continue tylenol TID  - Continue lidoderm to right upper back  - Pt states she now takes gabapentin 300mg qAM, 200mg midday, and 300mg qPM; will adjust orders  - Will provide bowel regimen and PPI/H2-blocker per home regimen  - palliative eval for pain management appreciated.
Cancer-associated.  - Continue lovenox 60mg daily, which is slightly less than 1.5mg/kg daily but consistent with home regimen  - Will need accurate weight to see if dose needs adjustment; ideally, pt would be agreeable to 1mg/kg BID for more reliable blood concentration  - Will need to hold lovenox for 24 hours before thoracentesis
Metastatic, in process of transitioning chemotherapy regimens.  - Appreciate Heme/Onc consult  - Outpatient treatment through [facility] [place]  - d/c tramadol 100mg TID --> switch to oral liquid morphine.   - Continue tylenol TID  - Continue lidoderm to right upper back  - Pt states she now takes gabapentin 300mg qAM, 200mg midday, and 300mg qPM; will adjust orders  - Will provide bowel regimen and PPI/H2-blocker per home regimen  - palliative eval for pain management appreciated.
- Home meds: tylenol 1000mg q8h, Tramadol 50mg q8h, aleve PRN breakthrough pain  - Discussed again with patient that we will adjust her morphine given that she has tolerated medication but still with intermittent pain  - Continue Morphine Solution 5mg PO q4h PRN moderate pain (hold for RR < 12, oversedation, hypotension)  - Start Morphine Solution 10mg PO q4h severe pain (hold for RR < 12, oversedation, hypotension)  - Tylenol 1000mg q8h ATC  - Continue gabapentin 300mg/200mg/300mg  - bowel regimen while on opioids: pt refusing senna, c/w miralax qd  - Zofran PRN nausea/vomiting.

## 2025-02-06 NOTE — PROGRESS NOTE ADULT - PROBLEM SELECTOR PROBLEM 1
Pleural effusion, right
Colorectal cancer
DVT, lower extremity
Pleural effusion, right
DVT, lower extremity
DVT, lower extremity
Pleural effusion, right
DVT, lower extremity
Colorectal cancer

## 2025-02-06 NOTE — PROGRESS NOTE ADULT - PROBLEM SELECTOR PLAN 2
Presumed malignant in setting of lung metastasis.   - Appreciate Thoracic Surgery and IR consults  - While pt is immunocompromised, there is no strong evidence she has PNA or other acute infection. If pt develops fever or worsening respiratory or other localizing symptoms can resume antibiotics.  - pt s/p chest tube by IR 1/21/25: 350 cc fluid removed, MIST tx per surgery  - apical PTX, chest tube management per CT surgery. chest tube removed
Presumed malignant in setting of lung metastasis.   - Appreciate Thoracic Surgery and IR consults  - While pt is immunocompromised, there is no strong evidence she has PNA or other acute infection. If pt develops fever or worsening respiratory or other localizing symptoms can resume antibiotics.  - pt s/p chest tube by IR 1/21/25: 350 cc fluid removed, MIST tx per surgery  - apical PTX, chest tube management per CT surgery. chest tube removed  - palliative follow up for pain management
Presumed malignant in setting of lung metastasis.   - Appreciate Thoracic Surgery and IR consults  - While pt is immunocompromised, there is no strong evidence she has PNA or other acute infection. If pt develops fever or worsening respiratory or other localizing symptoms can resume antibiotics.  - pt s/p chest tube by IR 1/21/25: 350 cc fluid removed, MIST tx per surgery  - apical PTX, chest tube management per CT surgery. chest tube removed  - palliative follow up for pain management
Metastatic, in process of transitioning chemotherapy regimens.  - Appreciate Heme/Onc consult  - Outpatient treatment through [facility] [place]  - d/c tramadol 100mg TID --> switch to oral liquid morphine.   - Continue tylenol TID  - Continue lidoderm to right upper back  - Pt states she now takes gabapentin 300mg qAM, 200mg midday, and 300mg qPM; will adjust orders  - Will provide bowel regimen and PPI/H2-blocker per home regimen  - palliative eval for pain management appreciated.
Presumed malignant in setting of lung metastasis.   - Appreciate Thoracic Surgery and IR consults  - While pt is immunocompromised, there is no strong evidence she has PNA or other acute infection. If pt develops fever or worsening respiratory or other localizing symptoms can resume antibiotics.  - pt s/p chest tube by IR 1/21/25: 350 cc fluid removed, MIST tx per surgery  - apical PTX, chest tube management per CT surgery. chest tube removed
Presumed malignant in setting of lung metastasis.   - Appreciate Thoracic Surgery and IR consults  - While pt is immunocompromised, there is no strong evidence she has PNA or other acute infection. If pt develops fever or worsening respiratory or other localizing symptoms can resume antibiotics.  - pt s/p chest tube by IR 1/21/25: 350 cc fluid removed, MIST tx per surgery  - apical PTX, chest tube management per CT surgery. chest tube removed
Presumed malignant in setting of lung metastasis.   - Appreciate Thoracic Surgery and IR consults  - While pt is immunocompromised, there is no strong evidence she has PNA or other acute infection. If pt develops fever or worsening respiratory or other localizing symptoms can resume antibiotics.  - pt s/p chest tube by IR 1/21/25: 350 cc fluid removed, MIST tx per surgery  - apical PTX, chest tube management per CT surgery. chest tube removed  - palliative follow up for pain management
Metastatic, in process of transitioning chemotherapy regimens.  - Appreciate Heme/Onc consult  - [place] [facility] [place]  - Continue tramadol 100mg TID  - Continue tylenol TID  - Continue lidoderm to right upper back  - Pt states she now takes gabapentin 300mg qAM, 200mg midday, and 300mg qPM; will adjust orders  - Will provide bowel regimen and PPI/H2-blocker per home regimen  - palliative eval for pain management
Metastatic, in process of transitioning chemotherapy regimens.  - Appreciate Heme/Onc consult  - Outpatient treatment through [facility] [place]  - d/c tramadol 100mg TID --> switch to oral liquid morphine.   - Continue tylenol TID  - Continue lidoderm to right upper back  - Pt states she now takes gabapentin 300mg qAM, 200mg midday, and 300mg qPM; will adjust orders  - Will provide bowel regimen and PPI/H2-blocker per home regimen  - palliative eval for pain management appreciated.
Metastatic, in process of transitioning chemotherapy regimens.  - Appreciate Heme/Onc consult  - [place] [facility] [place]  - Continue tramadol 100mg TID  - Continue tylenol TID  - Continue lidoderm to right upper back  - Pt states she now takes gabapentin 300mg qAM, 200mg midday, and 300mg qPM; will adjust orders  - Will provide bowel regimen and PPI/H2-blocker per home regimen
Metastatic, in process of transitioning chemotherapy regimens.  - Appreciate Heme/Onc consult  - [place] [facility] [place] Continue tramadol 100mg TID  - Continue tylenol TID  - Continue lidoderm to right upper back  - Pt states she now takes gabapentin 300mg qAM, 200mg midday, and 300mg qPM; will adjust orders  - Will provide bowel regimen and PPI/H2-blocker per home regimen
Presumed malignant in setting of lung metastasis.   - Appreciate Thoracic Surgery and IR consults  - While pt is immunocompromised, there is no strong evidence she has PNA or other acute infection. If pt develops fever or worsening respiratory or other localizing symptoms can resume antibiotics.  - pt s/p chest tube by IR 1/21/25: 350 cc fluid removed, MIST tx per surgery  - apical PTX, chest tube management per CT surgery. chest tube removed  - palliative follow up for pain management
Metastatic, in process of transitioning chemotherapy regimens.  - Appreciate Heme/Onc consult  - Outpatient treatment through [facility] [place]  - d/c tramadol 100mg TID --> switch to oral liquid morphine.   - Continue tylenol TID  - Continue lidoderm to right upper back  - Pt states she now takes gabapentin 300mg qAM, 200mg midday, and 300mg qPM; will adjust orders  - Will provide bowel regimen and PPI/H2-blocker per home regimen  - palliative eval for pain management appreciated.  - LE DVt study, swelling noted
Metastatic, in process of transitioning chemotherapy regimens.  - Appreciate Heme/Onc consult  - [place] [facility] [place]  - Continue tramadol 100mg TID  - Continue tylenol TID  - Continue lidoderm to right upper back  - Pt states she now takes gabapentin 300mg qAM, 200mg midday, and 300mg qPM; will adjust orders  - Will provide bowel regimen and PPI/H2-blocker per home regimen  - palliative eval for pain management
Presumed malignant in setting of lung metastasis.   - Appreciate Thoracic Surgery and IR consults  - While pt is immunocompromised, there is no strong evidence she has PNA or other acute infection. If pt develops fever or worsening respiratory or other localizing symptoms can resume antibiotics.  - pt s/p chest tube by IR 1/21/25: 350 cc fluid removed, MIST tx per surgery  - apical PTX, chest tube management per CT surgery. chest tube removed  - palliative follow up for pain management
Presumed malignant in setting of lung metastasis.   - Appreciate Thoracic Surgery and IR consults  - While pt is immunocompromised, there is no strong evidence she has PNA or other acute infection. If pt develops fever or worsening respiratory or other localizing symptoms can resume antibiotics.  - pt s/p chest tube by IR 1/21/25: 350 cc fluid removed, MIST tx per surgery  - apical PTX, chest tube management per CT surgery. chest tube removed  - palliative follow up for pain management
Metastatic, in process of transitioning chemotherapy regimens.  - Appreciate Heme/Onc consult  - [place] [facility] [place]  - Continue tramadol 100mg TID  - Continue tylenol TID  - Continue lidoderm to right upper back  - Pt states she now takes gabapentin 300mg qAM, 200mg midday, and 300mg qPM; will adjust orders  - Will provide bowel regimen and PPI/H2-blocker per home regimen
Metastatic, in process of transitioning chemotherapy regimens.  - Appreciate Heme/Onc consult  [place] [facility] [place] d/c tramadol 100mg TID --> switch to oral liquid morphine.   - Continue tylenol TID  - Continue lidoderm to right upper back  - Pt states she now takes gabapentin 300mg qAM, 200mg midday, and 300mg qPM; will adjust orders  - Will provide bowel regimen and PPI/H2-blocker per home regimen  - palliative eval for pain management appreciated.
Presumed malignant in setting of lung metastasis.   - Appreciate Thoracic Surgery and IR consults  - While pt is immunocompromised, there is no strong evidence she has PNA or other acute infection. If pt develops fever or worsening respiratory or other localizing symptoms can resume antibiotics.  - pt s/p chest tube by IR 1/21/25: 350 cc fluid removed, MIST tx per surgery  - apical PTX, chest tube management per CT surgery. chest tube removed  - palliative follow up for pain management
- CT Chest 1/17- Moderate loculated right pleural effusion.  - s/p R PTC by IR and is currently undergoing MIST therapy.  - CT surgery recommendations appreciated - PTC removed  - management as per primary team
- patient s/p chest tube placement  - currently on MIST protocol per CT surgery  - CXR 1/24: IMPRESSION: Follow-up with pigtail catheter and decreasing loculated effusion/atelectasis. Right apical pneumothorax.  - management per primary and CT surgery.

## 2025-02-06 NOTE — PROGRESS NOTE ADULT - SUBJECTIVE AND OBJECTIVE BOX
Name of Patient : TAHIR PENA  MRN: [id number]  Date of visit: 02-06-25       Subjective: Patient seen and examined. No new events except as noted.   doing okay  pain better controlled     REVIEW OF SYSTEMS:    CONSTITUTIONAL: No weakness, fevers or chills  EYES/ENT: No visual changes;  No vertigo or throat pain   NECK: No pain or stiffness  RESPIRATORY: No cough, wheezing, hemoptysis; No shortness of breath  CARDIOVASCULAR: No chest pain or palpitations  GASTROINTESTINAL: No abdominal or epigastric pain. No nausea, vomiting, or hematemesis; No diarrhea or constipation. No melena or hematochezia.  GENITOURINARY: No dysuria, frequency or hematuria  NEUROLOGICAL: No numbness or weakness  SKIN: No itching, burning, rashes, or lesions   All other review of systems is negative unless indicated above.    MEDICATIONS:  MEDICATIONS  (STANDING):  ALPRAZolam 0.25 milliGRAM(s) Oral daily  artificial tears (preservative free) Ophthalmic Solution 1 Drop(s) Both EYES three times a day  benzonatate 100 milliGRAM(s) Oral three times a day  enoxaparin Injectable 60 milliGRAM(s) SubCutaneous every 24 hours  famotidine    Tablet 20 milliGRAM(s) Oral at bedtime  gabapentin 300 milliGRAM(s) Oral <User Schedule>  gabapentin 200 milliGRAM(s) Oral <User Schedule>  lidocaine   4% Patch 1 Patch Transdermal daily  pantoprazole    Tablet 40 milliGRAM(s) Oral before breakfast  polyethylene glycol 3350 17 Gram(s) Oral daily  sodium chloride 0.9% Solution for Pleural Effusion 30 milliLiter(s) IntraPleural. every 12 hours      PHYSICAL EXAM:  T(C): 36.5 (02-06-25 @ 11:10), Max: 37.4 (02-05-25 @ 22:15)  HR: 97 (02-06-25 @ 11:10) (88 - 97)  BP: 100/54 (02-06-25 @ 11:10) (100/54 - 116/61)  RR: 16 (02-06-25 @ 11:10) (16 - 18)  SpO2: 97% (02-06-25 @ 11:10) (97% - 98%)  Wt(kg): --  I&O's Summary        Appearance: Normal	  HEENT:  PERRLA   Lymphatic: No lymphadenopathy   Cardiovascular: Normal S1 S2, no JVD  Respiratory: normal effort , clear  Gastrointestinal:  Soft, Non-tender  Skin: No rashes,  warm to touch  Psychiatry:  Mood & affect appropriate  Musculuskeletal: No edema    recent labs, Imaging and EKGs personally reviewed   CODE status discussed with the patient in detail                          8.0    6.91  )-----------( 250      ( 06 Feb 2025 05:20 )             26.2               02-06    135  |  98  |  17  ----------------------------<  91  4.5   |  25  |  0.58    Ca    9.3      06 Feb 2025 05:20  Phos  3.7     02-06  Mg     2.10     02-06                         Urinalysis Basic - ( 06 Feb 2025 05:20 )    Color: x / Appearance: x / SG: x / pH: x  Gluc: 91 mg/dL / Ketone: x  / Bili: x / Urobili: x   Blood: x / Protein: x / Nitrite: x   Leuk Esterase: x / RBC: x / WBC x   Sq Epi: x / Non Sq Epi: x / Bacteria: x

## 2025-02-07 ENCOUNTER — TRANSCRIPTION ENCOUNTER (OUTPATIENT)
Age: 75
End: 2025-02-07

## 2025-02-07 NOTE — CHART NOTE - NSCHARTNOTEFT_GEN_A_CORE
Patient was outreached but did not answer. A voicemail was left for the patient to return our call. GRANDDAUGHTER NIRAJ - [phone] IN CHARGE OF APPOINTMENTS.

## 2025-02-07 NOTE — CHART NOTE - NSCHARTNOTESELECT_GEN_ALL_CORE
D/C Appointments/Event Note
Event Note
MIST #4/Event Note
MIST #5/Event Note
Thoracic Surgery MIST #6/Event Note
post op check/Event Note
ACP-istop/Event Note
Event Note
ISTOP/Event Note
MIST #1
MIST #2/Event Note
Nutrition Services

## 2025-02-11 ENCOUNTER — INPATIENT (INPATIENT)
Facility: HOSPITAL | Age: 75
LOS: 3 days | Discharge: HOSPICE | End: 2025-02-15
Attending: INTERNAL MEDICINE | Admitting: INTERNAL MEDICINE
Payer: MEDICARE

## 2025-02-11 VITALS
WEIGHT: 104.94 LBS | TEMPERATURE: 98 F | OXYGEN SATURATION: 100 % | SYSTOLIC BLOOD PRESSURE: 117 MMHG | DIASTOLIC BLOOD PRESSURE: 65 MMHG | HEART RATE: 91 BPM | RESPIRATION RATE: 18 BRPM

## 2025-02-11 DIAGNOSIS — C18.9 MALIGNANT NEOPLASM OF COLON, UNSPECIFIED: ICD-10-CM

## 2025-02-11 LAB
ALBUMIN SERPL ELPH-MCNC: 2.8 G/DL — LOW (ref 3.3–5)
ALP SERPL-CCNC: 487 U/L — HIGH (ref 40–120)
ALT FLD-CCNC: 24 U/L — SIGNIFICANT CHANGE UP (ref 4–33)
ANION GAP SERPL CALC-SCNC: 13 MMOL/L — SIGNIFICANT CHANGE UP (ref 7–14)
ANISOCYTOSIS BLD QL: SLIGHT — SIGNIFICANT CHANGE UP
AST SERPL-CCNC: 31 U/L — SIGNIFICANT CHANGE UP (ref 4–32)
BASOPHILS # BLD AUTO: 0.07 K/UL — SIGNIFICANT CHANGE UP (ref 0–0.2)
BASOPHILS NFR BLD AUTO: 0.9 % — SIGNIFICANT CHANGE UP (ref 0–2)
BILIRUB SERPL-MCNC: 0.2 MG/DL — SIGNIFICANT CHANGE UP (ref 0.2–1.2)
BUN SERPL-MCNC: 17 MG/DL — SIGNIFICANT CHANGE UP (ref 7–23)
CALCIUM SERPL-MCNC: 9.3 MG/DL — SIGNIFICANT CHANGE UP (ref 8.4–10.5)
CHLORIDE SERPL-SCNC: 103 MMOL/L — SIGNIFICANT CHANGE UP (ref 98–107)
CO2 SERPL-SCNC: 23 MMOL/L — SIGNIFICANT CHANGE UP (ref 22–31)
CREAT SERPL-MCNC: 0.57 MG/DL — SIGNIFICANT CHANGE UP (ref 0.5–1.3)
EGFR: 96 ML/MIN/1.73M2 — SIGNIFICANT CHANGE UP
EGFR: 96 ML/MIN/1.73M2 — SIGNIFICANT CHANGE UP
EOSINOPHIL # BLD AUTO: 0.14 K/UL — SIGNIFICANT CHANGE UP (ref 0–0.5)
EOSINOPHIL NFR BLD AUTO: 1.8 % — SIGNIFICANT CHANGE UP (ref 0–6)
GLUCOSE SERPL-MCNC: 84 MG/DL — SIGNIFICANT CHANGE UP (ref 70–99)
HCT VFR BLD CALC: 28.4 % — LOW (ref 34.5–45)
HGB BLD-MCNC: 8.2 G/DL — LOW (ref 11.5–15.5)
HYPOCHROMIA BLD QL: SLIGHT — SIGNIFICANT CHANGE UP
IANC: 6.83 K/UL — SIGNIFICANT CHANGE UP (ref 1.8–7.4)
LYMPHOCYTES # BLD AUTO: 0.22 K/UL — LOW (ref 1–3.3)
LYMPHOCYTES # BLD AUTO: 2.7 % — LOW (ref 13–44)
MACROCYTES BLD QL: SLIGHT — SIGNIFICANT CHANGE UP
MANUAL SMEAR VERIFICATION: SIGNIFICANT CHANGE UP
MCHC RBC-ENTMCNC: 28.8 PG — SIGNIFICANT CHANGE UP (ref 27–34)
MCHC RBC-ENTMCNC: 28.9 G/DL — LOW (ref 32–36)
MCV RBC AUTO: 99.6 FL — SIGNIFICANT CHANGE UP (ref 80–100)
MONOCYTES # BLD AUTO: 0.35 K/UL — SIGNIFICANT CHANGE UP (ref 0–0.9)
MONOCYTES NFR BLD AUTO: 4.4 % — SIGNIFICANT CHANGE UP (ref 2–14)
NEUTROPHILS # BLD AUTO: 7.25 K/UL — SIGNIFICANT CHANGE UP (ref 1.8–7.4)
NEUTROPHILS NFR BLD AUTO: 90.2 % — HIGH (ref 43–77)
OVALOCYTES BLD QL SMEAR: SLIGHT — SIGNIFICANT CHANGE UP
PLAT MORPH BLD: ABNORMAL
PLATELET # BLD AUTO: 335 K/UL — SIGNIFICANT CHANGE UP (ref 150–400)
PLATELET COUNT - ESTIMATE: NORMAL — SIGNIFICANT CHANGE UP
POLYCHROMASIA BLD QL SMEAR: SLIGHT — SIGNIFICANT CHANGE UP
POTASSIUM SERPL-MCNC: 4.1 MMOL/L — SIGNIFICANT CHANGE UP (ref 3.5–5.3)
POTASSIUM SERPL-SCNC: 4.1 MMOL/L — SIGNIFICANT CHANGE UP (ref 3.5–5.3)
PROT SERPL-MCNC: 6.3 G/DL — SIGNIFICANT CHANGE UP (ref 6–8.3)
RBC # BLD: 2.85 M/UL — LOW (ref 3.8–5.2)
RBC # FLD: 17.5 % — HIGH (ref 10.3–14.5)
RBC BLD AUTO: SIGNIFICANT CHANGE UP
SODIUM SERPL-SCNC: 139 MMOL/L — SIGNIFICANT CHANGE UP (ref 135–145)
WBC # BLD: 8.04 K/UL — SIGNIFICANT CHANGE UP (ref 3.8–10.5)
WBC # FLD AUTO: 8.04 K/UL — SIGNIFICANT CHANGE UP (ref 3.8–10.5)

## 2025-02-11 PROCEDURE — 99223 1ST HOSP IP/OBS HIGH 75: CPT

## 2025-02-11 PROCEDURE — 99285 EMERGENCY DEPT VISIT HI MDM: CPT | Mod: FS

## 2025-02-11 RX ORDER — ONDANSETRON HCL/PF 4 MG/2 ML
4 VIAL (ML) INJECTION EVERY 8 HOURS
Refills: 0 | Status: DISCONTINUED | OUTPATIENT
Start: 2025-02-11 | End: 2025-02-15

## 2025-02-11 RX ORDER — ACETAMINOPHEN 500 MG/5ML
725 LIQUID (ML) ORAL ONCE
Refills: 0 | Status: COMPLETED | OUTPATIENT
Start: 2025-02-11 | End: 2025-02-11

## 2025-02-11 RX ORDER — ACETAMINOPHEN 500 MG/5ML
650 LIQUID (ML) ORAL EVERY 6 HOURS
Refills: 0 | Status: DISCONTINUED | OUTPATIENT
Start: 2025-02-11 | End: 2025-02-14

## 2025-02-11 RX ORDER — GABAPENTIN 400 MG/1
300 CAPSULE ORAL ONCE
Refills: 0 | Status: COMPLETED | OUTPATIENT
Start: 2025-02-11 | End: 2025-02-11

## 2025-02-11 RX ORDER — ACETAMINOPHEN 500 MG/5ML
725 LIQUID (ML) ORAL ONCE
Refills: 0 | Status: DISCONTINUED | OUTPATIENT
Start: 2025-02-11 | End: 2025-02-11

## 2025-02-11 RX ORDER — FENTANYL CITRATE-0.9 % NACL/PF 100MCG/2ML
50 SYRINGE (ML) INTRAVENOUS ONCE
Refills: 0 | Status: DISCONTINUED | OUTPATIENT
Start: 2025-02-11 | End: 2025-02-11

## 2025-02-11 RX ORDER — MELATONIN 5 MG
3 TABLET ORAL AT BEDTIME
Refills: 0 | Status: DISCONTINUED | OUTPATIENT
Start: 2025-02-11 | End: 2025-02-15

## 2025-02-11 RX ORDER — MAGNESIUM, ALUMINUM HYDROXIDE 200-200 MG
30 TABLET,CHEWABLE ORAL EVERY 4 HOURS
Refills: 0 | Status: DISCONTINUED | OUTPATIENT
Start: 2025-02-11 | End: 2025-02-15

## 2025-02-11 RX ADMIN — Medication 50 MICROGRAM(S): at 22:46

## 2025-02-11 RX ADMIN — GABAPENTIN 300 MILLIGRAM(S): 400 CAPSULE ORAL at 22:46

## 2025-02-11 RX ADMIN — Medication 2 MILLIGRAM(S): at 19:26

## 2025-02-11 RX ADMIN — Medication 290 MILLIGRAM(S): at 21:18

## 2025-02-11 RX ADMIN — Medication 1000 MILLILITER(S): at 19:26

## 2025-02-12 ENCOUNTER — TRANSCRIPTION ENCOUNTER (OUTPATIENT)
Age: 75
End: 2025-02-12

## 2025-02-12 DIAGNOSIS — Z86.718 PERSONAL HISTORY OF OTHER VENOUS THROMBOSIS AND EMBOLISM: ICD-10-CM

## 2025-02-12 DIAGNOSIS — C20 MALIGNANT NEOPLASM OF RECTUM: ICD-10-CM

## 2025-02-12 DIAGNOSIS — Z93.3 COLOSTOMY STATUS: Chronic | ICD-10-CM

## 2025-02-12 DIAGNOSIS — Z79.899 OTHER LONG TERM (CURRENT) DRUG THERAPY: ICD-10-CM

## 2025-02-12 DIAGNOSIS — Z29.9 ENCOUNTER FOR PROPHYLACTIC MEASURES, UNSPECIFIED: ICD-10-CM

## 2025-02-12 DIAGNOSIS — G89.3 NEOPLASM RELATED PAIN (ACUTE) (CHRONIC): ICD-10-CM

## 2025-02-12 LAB
ALBUMIN SERPL ELPH-MCNC: 2.7 G/DL — LOW (ref 3.3–5)
ALP SERPL-CCNC: 439 U/L — HIGH (ref 40–120)
ALT FLD-CCNC: 18 U/L — SIGNIFICANT CHANGE UP (ref 4–33)
ANION GAP SERPL CALC-SCNC: 13 MMOL/L — SIGNIFICANT CHANGE UP (ref 7–14)
AST SERPL-CCNC: 19 U/L — SIGNIFICANT CHANGE UP (ref 4–32)
BILIRUB SERPL-MCNC: 0.2 MG/DL — SIGNIFICANT CHANGE UP (ref 0.2–1.2)
BUN SERPL-MCNC: 15 MG/DL — SIGNIFICANT CHANGE UP (ref 7–23)
CALCIUM SERPL-MCNC: 9.1 MG/DL — SIGNIFICANT CHANGE UP (ref 8.4–10.5)
CHLORIDE SERPL-SCNC: 102 MMOL/L — SIGNIFICANT CHANGE UP (ref 98–107)
CO2 SERPL-SCNC: 24 MMOL/L — SIGNIFICANT CHANGE UP (ref 22–31)
CREAT SERPL-MCNC: 0.56 MG/DL — SIGNIFICANT CHANGE UP (ref 0.5–1.3)
EGFR: 97 ML/MIN/1.73M2 — SIGNIFICANT CHANGE UP
EGFR: 97 ML/MIN/1.73M2 — SIGNIFICANT CHANGE UP
GLUCOSE SERPL-MCNC: 70 MG/DL — SIGNIFICANT CHANGE UP (ref 70–99)
HCT VFR BLD CALC: 26.8 % — LOW (ref 34.5–45)
HGB BLD-MCNC: 8 G/DL — LOW (ref 11.5–15.5)
MAGNESIUM SERPL-MCNC: 1.9 MG/DL — SIGNIFICANT CHANGE UP (ref 1.6–2.6)
MCHC RBC-ENTMCNC: 29.7 PG — SIGNIFICANT CHANGE UP (ref 27–34)
MCHC RBC-ENTMCNC: 29.9 G/DL — LOW (ref 32–36)
MCV RBC AUTO: 99.6 FL — SIGNIFICANT CHANGE UP (ref 80–100)
NRBC # BLD AUTO: 0 K/UL — SIGNIFICANT CHANGE UP (ref 0–0)
NRBC # FLD: 0 K/UL — SIGNIFICANT CHANGE UP (ref 0–0)
NRBC BLD AUTO-RTO: 0 /100 WBCS — SIGNIFICANT CHANGE UP (ref 0–0)
PHOSPHATE SERPL-MCNC: 3.4 MG/DL — SIGNIFICANT CHANGE UP (ref 2.5–4.5)
PLATELET # BLD AUTO: 292 K/UL — SIGNIFICANT CHANGE UP (ref 150–400)
POTASSIUM SERPL-MCNC: 4.2 MMOL/L — SIGNIFICANT CHANGE UP (ref 3.5–5.3)
POTASSIUM SERPL-SCNC: 4.2 MMOL/L — SIGNIFICANT CHANGE UP (ref 3.5–5.3)
PROT SERPL-MCNC: 5.8 G/DL — LOW (ref 6–8.3)
RBC # BLD: 2.69 M/UL — LOW (ref 3.8–5.2)
RBC # FLD: 17.5 % — HIGH (ref 10.3–14.5)
SODIUM SERPL-SCNC: 139 MMOL/L — SIGNIFICANT CHANGE UP (ref 135–145)
WBC # BLD: 6.66 K/UL — SIGNIFICANT CHANGE UP (ref 3.8–10.5)
WBC # FLD AUTO: 6.66 K/UL — SIGNIFICANT CHANGE UP (ref 3.8–10.5)

## 2025-02-12 PROCEDURE — 99222 1ST HOSP IP/OBS MODERATE 55: CPT

## 2025-02-12 PROCEDURE — 71045 X-RAY EXAM CHEST 1 VIEW: CPT | Mod: 26

## 2025-02-12 PROCEDURE — 93970 EXTREMITY STUDY: CPT | Mod: 26

## 2025-02-12 RX ORDER — ACETAMINOPHEN 500 MG/5ML
725 LIQUID (ML) ORAL ONCE
Refills: 0 | Status: COMPLETED | OUTPATIENT
Start: 2025-02-12 | End: 2025-02-12

## 2025-02-12 RX ORDER — ENOXAPARIN SODIUM 100 MG/ML
30 INJECTION SUBCUTANEOUS EVERY 24 HOURS
Refills: 0 | Status: DISCONTINUED | OUTPATIENT
Start: 2025-02-12 | End: 2025-02-12

## 2025-02-12 RX ORDER — HYDROMORPHONE/SOD CHLOR,ISO/PF 2 MG/10 ML
1 SYRINGE (ML) INJECTION EVERY 6 HOURS
Refills: 0 | Status: DISCONTINUED | OUTPATIENT
Start: 2025-02-12 | End: 2025-02-12

## 2025-02-12 RX ORDER — TRAMADOL HYDROCHLORIDE 50 MG/1
50 TABLET, FILM COATED ORAL EVERY 6 HOURS
Refills: 0 | Status: DISCONTINUED | OUTPATIENT
Start: 2025-02-12 | End: 2025-02-12

## 2025-02-12 RX ORDER — ENOXAPARIN SODIUM 100 MG/ML
40 INJECTION SUBCUTANEOUS EVERY 24 HOURS
Refills: 0 | Status: DISCONTINUED | OUTPATIENT
Start: 2025-02-12 | End: 2025-02-15

## 2025-02-12 RX ORDER — GABAPENTIN 400 MG/1
300 CAPSULE ORAL DAILY
Refills: 0 | Status: DISCONTINUED | OUTPATIENT
Start: 2025-02-12 | End: 2025-02-14

## 2025-02-12 RX ORDER — SODIUM CHLORIDE 9 G/1000ML
1000 INJECTION, SOLUTION INTRAVENOUS
Refills: 0 | Status: DISCONTINUED | OUTPATIENT
Start: 2025-02-12 | End: 2025-02-14

## 2025-02-12 RX ORDER — WHITE PETROLATUM 1 G/G
1 OINTMENT TOPICAL EVERY 6 HOURS
Refills: 0 | Status: DISCONTINUED | OUTPATIENT
Start: 2025-02-12 | End: 2025-02-15

## 2025-02-12 RX ORDER — LANOLIN/MINERAL OIL/PETROLATUM
1 OINTMENT (GRAM) OPHTHALMIC (EYE) EVERY 6 HOURS
Refills: 0 | Status: DISCONTINUED | OUTPATIENT
Start: 2025-02-12 | End: 2025-02-15

## 2025-02-12 RX ORDER — HYDROMORPHONE/SOD CHLOR,ISO/PF 2 MG/10 ML
0.5 SYRINGE (ML) INJECTION EVERY 6 HOURS
Refills: 0 | Status: DISCONTINUED | OUTPATIENT
Start: 2025-02-12 | End: 2025-02-12

## 2025-02-12 RX ORDER — ENOXAPARIN SODIUM 100 MG/ML
50 INJECTION SUBCUTANEOUS EVERY 24 HOURS
Refills: 0 | Status: DISCONTINUED | OUTPATIENT
Start: 2025-02-12 | End: 2025-02-12

## 2025-02-12 RX ORDER — POLYETHYLENE GLYCOL 3350 17 G/17G
17 POWDER, FOR SOLUTION ORAL
Refills: 0 | Status: DISCONTINUED | OUTPATIENT
Start: 2025-02-12 | End: 2025-02-15

## 2025-02-12 RX ORDER — SENNA 187 MG
2 TABLET ORAL AT BEDTIME
Refills: 0 | Status: DISCONTINUED | OUTPATIENT
Start: 2025-02-12 | End: 2025-02-15

## 2025-02-12 RX ADMIN — Medication 290 MILLIGRAM(S): at 02:06

## 2025-02-12 RX ADMIN — Medication 7.5 MILLIGRAM(S): at 12:34

## 2025-02-12 RX ADMIN — Medication 0.5 MILLIGRAM(S): at 05:22

## 2025-02-12 RX ADMIN — Medication 7.5 MILLIGRAM(S): at 16:56

## 2025-02-12 RX ADMIN — Medication 7.5 MILLIGRAM(S): at 13:34

## 2025-02-12 RX ADMIN — ENOXAPARIN SODIUM 50 MILLIGRAM(S): 100 INJECTION SUBCUTANEOUS at 08:51

## 2025-02-12 RX ADMIN — GABAPENTIN 300 MILLIGRAM(S): 400 CAPSULE ORAL at 11:43

## 2025-02-12 RX ADMIN — Medication 7.5 MILLIGRAM(S): at 22:34

## 2025-02-12 RX ADMIN — Medication 40 MILLIGRAM(S): at 08:51

## 2025-02-12 RX ADMIN — Medication 7.5 MILLIGRAM(S): at 17:56

## 2025-02-12 RX ADMIN — Medication 290 MILLIGRAM(S): at 10:14

## 2025-02-12 RX ADMIN — POLYETHYLENE GLYCOL 3350 17 GRAM(S): 17 POWDER, FOR SOLUTION ORAL at 16:57

## 2025-02-12 RX ADMIN — Medication 725 MILLIGRAM(S): at 11:14

## 2025-02-12 RX ADMIN — Medication 1 DROP(S): at 22:36

## 2025-02-12 RX ADMIN — SODIUM CHLORIDE 60 MILLILITER(S): 9 INJECTION, SOLUTION INTRAVENOUS at 14:14

## 2025-02-13 ENCOUNTER — RESULT REVIEW (OUTPATIENT)
Age: 75
End: 2025-02-13

## 2025-02-13 LAB
ALBUMIN SERPL ELPH-MCNC: 2.7 G/DL — LOW (ref 3.3–5)
ALP SERPL-CCNC: 390 U/L — HIGH (ref 40–120)
ALT FLD-CCNC: 12 U/L — SIGNIFICANT CHANGE UP (ref 4–33)
ANION GAP SERPL CALC-SCNC: 12 MMOL/L — SIGNIFICANT CHANGE UP (ref 7–14)
AST SERPL-CCNC: 11 U/L — SIGNIFICANT CHANGE UP (ref 4–32)
BILIRUB SERPL-MCNC: <0.2 MG/DL — SIGNIFICANT CHANGE UP (ref 0.2–1.2)
BUN SERPL-MCNC: 12 MG/DL — SIGNIFICANT CHANGE UP (ref 7–23)
CALCIUM SERPL-MCNC: 9.1 MG/DL — SIGNIFICANT CHANGE UP (ref 8.4–10.5)
CHLORIDE SERPL-SCNC: 98 MMOL/L — SIGNIFICANT CHANGE UP (ref 98–107)
CO2 SERPL-SCNC: 24 MMOL/L — SIGNIFICANT CHANGE UP (ref 22–31)
CREAT SERPL-MCNC: 0.51 MG/DL — SIGNIFICANT CHANGE UP (ref 0.5–1.3)
EGFR: 99 ML/MIN/1.73M2 — SIGNIFICANT CHANGE UP
EGFR: 99 ML/MIN/1.73M2 — SIGNIFICANT CHANGE UP
GLUCOSE SERPL-MCNC: 99 MG/DL — SIGNIFICANT CHANGE UP (ref 70–99)
HCT VFR BLD CALC: 26.7 % — LOW (ref 34.5–45)
HGB BLD-MCNC: 8.1 G/DL — LOW (ref 11.5–15.5)
MCHC RBC-ENTMCNC: 29.6 PG — SIGNIFICANT CHANGE UP (ref 27–34)
MCHC RBC-ENTMCNC: 30.3 G/DL — LOW (ref 32–36)
MCV RBC AUTO: 97.4 FL — SIGNIFICANT CHANGE UP (ref 80–100)
NRBC # BLD AUTO: 0 K/UL — SIGNIFICANT CHANGE UP (ref 0–0)
NRBC # FLD: 0 K/UL — SIGNIFICANT CHANGE UP (ref 0–0)
NRBC BLD AUTO-RTO: 0 /100 WBCS — SIGNIFICANT CHANGE UP (ref 0–0)
PLATELET # BLD AUTO: 325 K/UL — SIGNIFICANT CHANGE UP (ref 150–400)
POTASSIUM SERPL-MCNC: 3.7 MMOL/L — SIGNIFICANT CHANGE UP (ref 3.5–5.3)
POTASSIUM SERPL-SCNC: 3.7 MMOL/L — SIGNIFICANT CHANGE UP (ref 3.5–5.3)
PROT SERPL-MCNC: 5.6 G/DL — LOW (ref 6–8.3)
RBC # BLD: 2.74 M/UL — LOW (ref 3.8–5.2)
RBC # FLD: 17.4 % — HIGH (ref 10.3–14.5)
SODIUM SERPL-SCNC: 134 MMOL/L — LOW (ref 135–145)
WBC # BLD: 7.97 K/UL — SIGNIFICANT CHANGE UP (ref 3.8–10.5)
WBC # FLD AUTO: 7.97 K/UL — SIGNIFICANT CHANGE UP (ref 3.8–10.5)

## 2025-02-13 PROCEDURE — 93978 VASCULAR STUDY: CPT | Mod: 26

## 2025-02-13 RX ORDER — ACETAMINOPHEN 500 MG/5ML
725 LIQUID (ML) ORAL ONCE
Refills: 0 | Status: COMPLETED | OUTPATIENT
Start: 2025-02-13 | End: 2025-02-13

## 2025-02-13 RX ORDER — HYDROCORTISONE 10 MG/G
1 CREAM TOPICAL
Refills: 0 | Status: DISCONTINUED | OUTPATIENT
Start: 2025-02-13 | End: 2025-02-15

## 2025-02-13 RX ORDER — SODIUM CHLORIDE 0.65 %
1 AEROSOL, SPRAY (ML) NASAL EVERY 4 HOURS
Refills: 0 | Status: DISCONTINUED | OUTPATIENT
Start: 2025-02-13 | End: 2025-02-15

## 2025-02-13 RX ORDER — WHITE PETROLATUM 1 G/G
1 OINTMENT TOPICAL EVERY 6 HOURS
Refills: 0 | Status: DISCONTINUED | OUTPATIENT
Start: 2025-02-13 | End: 2025-02-13

## 2025-02-13 RX ADMIN — GABAPENTIN 300 MILLIGRAM(S): 400 CAPSULE ORAL at 12:25

## 2025-02-13 RX ADMIN — HYDROCORTISONE 1 APPLICATION(S): 10 CREAM TOPICAL at 18:29

## 2025-02-13 RX ADMIN — Medication 7.5 MILLIGRAM(S): at 14:55

## 2025-02-13 RX ADMIN — Medication 7.5 MILLIGRAM(S): at 03:00

## 2025-02-13 RX ADMIN — Medication 40 MILLIGRAM(S): at 06:02

## 2025-02-13 RX ADMIN — Medication 290 MILLIGRAM(S): at 17:39

## 2025-02-13 RX ADMIN — Medication 290 MILLIGRAM(S): at 06:26

## 2025-02-13 RX ADMIN — Medication 7.5 MILLIGRAM(S): at 15:55

## 2025-02-13 RX ADMIN — Medication 7.5 MILLIGRAM(S): at 10:52

## 2025-02-13 RX ADMIN — ENOXAPARIN SODIUM 40 MILLIGRAM(S): 100 INJECTION SUBCUTANEOUS at 12:28

## 2025-02-13 RX ADMIN — Medication 7.5 MILLIGRAM(S): at 11:52

## 2025-02-13 RX ADMIN — Medication 1 DROP(S): at 18:35

## 2025-02-13 RX ADMIN — Medication 7.5 MILLIGRAM(S): at 22:30

## 2025-02-13 RX ADMIN — Medication 725 MILLIGRAM(S): at 18:39

## 2025-02-13 RX ADMIN — Medication 4 MILLIGRAM(S): at 08:47

## 2025-02-14 ENCOUNTER — TRANSCRIPTION ENCOUNTER (OUTPATIENT)
Age: 75
End: 2025-02-14

## 2025-02-14 LAB
ALBUMIN SERPL ELPH-MCNC: 2.7 G/DL — LOW (ref 3.3–5)
ALP SERPL-CCNC: 332 U/L — HIGH (ref 40–120)
ALT FLD-CCNC: 10 U/L — SIGNIFICANT CHANGE UP (ref 4–33)
ANION GAP SERPL CALC-SCNC: 9 MMOL/L — SIGNIFICANT CHANGE UP (ref 7–14)
AST SERPL-CCNC: 11 U/L — SIGNIFICANT CHANGE UP (ref 4–32)
BILIRUB SERPL-MCNC: 0.2 MG/DL — SIGNIFICANT CHANGE UP (ref 0.2–1.2)
BUN SERPL-MCNC: 11 MG/DL — SIGNIFICANT CHANGE UP (ref 7–23)
CALCIUM SERPL-MCNC: 9.3 MG/DL — SIGNIFICANT CHANGE UP (ref 8.4–10.5)
CHLORIDE SERPL-SCNC: 101 MMOL/L — SIGNIFICANT CHANGE UP (ref 98–107)
CO2 SERPL-SCNC: 27 MMOL/L — SIGNIFICANT CHANGE UP (ref 22–31)
CREAT SERPL-MCNC: 0.52 MG/DL — SIGNIFICANT CHANGE UP (ref 0.5–1.3)
EGFR: 99 ML/MIN/1.73M2 — SIGNIFICANT CHANGE UP
EGFR: 99 ML/MIN/1.73M2 — SIGNIFICANT CHANGE UP
GLUCOSE SERPL-MCNC: 87 MG/DL — SIGNIFICANT CHANGE UP (ref 70–99)
HCT VFR BLD CALC: 26.3 % — LOW (ref 34.5–45)
HGB BLD-MCNC: 8 G/DL — LOW (ref 11.5–15.5)
MCHC RBC-ENTMCNC: 29.7 PG — SIGNIFICANT CHANGE UP (ref 27–34)
MCHC RBC-ENTMCNC: 30.4 G/DL — LOW (ref 32–36)
MCV RBC AUTO: 97.8 FL — SIGNIFICANT CHANGE UP (ref 80–100)
MRSA PCR RESULT.: SIGNIFICANT CHANGE UP
NRBC # BLD AUTO: 0 K/UL — SIGNIFICANT CHANGE UP (ref 0–0)
NRBC # FLD: 0 K/UL — SIGNIFICANT CHANGE UP (ref 0–0)
NRBC BLD AUTO-RTO: 0 /100 WBCS — SIGNIFICANT CHANGE UP (ref 0–0)
PLATELET # BLD AUTO: 305 K/UL — SIGNIFICANT CHANGE UP (ref 150–400)
POTASSIUM SERPL-MCNC: 3.8 MMOL/L — SIGNIFICANT CHANGE UP (ref 3.5–5.3)
POTASSIUM SERPL-SCNC: 3.8 MMOL/L — SIGNIFICANT CHANGE UP (ref 3.5–5.3)
PROT SERPL-MCNC: 5.7 G/DL — LOW (ref 6–8.3)
RBC # BLD: 2.69 M/UL — LOW (ref 3.8–5.2)
RBC # FLD: 17.6 % — HIGH (ref 10.3–14.5)
S AUREUS DNA NOSE QL NAA+PROBE: SIGNIFICANT CHANGE UP
SODIUM SERPL-SCNC: 137 MMOL/L — SIGNIFICANT CHANGE UP (ref 135–145)
WBC # BLD: 7.43 K/UL — SIGNIFICANT CHANGE UP (ref 3.8–10.5)
WBC # FLD AUTO: 7.43 K/UL — SIGNIFICANT CHANGE UP (ref 3.8–10.5)

## 2025-02-14 RX ORDER — GABAPENTIN 400 MG/1
300 CAPSULE ORAL
Refills: 0 | Status: DISCONTINUED | OUTPATIENT
Start: 2025-02-14 | End: 2025-02-15

## 2025-02-14 RX ORDER — PROCHLORPERAZINE 25 MG
1 SUPPOSITORY, RECTAL RECTAL
Qty: 6 | Refills: 0
Start: 2025-02-14 | End: 2025-02-16

## 2025-02-14 RX ORDER — HALOPERIDOL 10 MG/1
1 TABLET ORAL
Qty: 12 | Refills: 0
Start: 2025-02-14 | End: 2025-02-16

## 2025-02-14 RX ORDER — BENZONATATE 100 MG
100 CAPSULE ORAL THREE TIMES A DAY
Refills: 0 | Status: DISCONTINUED | OUTPATIENT
Start: 2025-02-14 | End: 2025-02-15

## 2025-02-14 RX ORDER — POLYETHYLENE GLYCOL 3350 17 G/17G
17 POWDER, FOR SOLUTION ORAL
Qty: 238 | Refills: 0
Start: 2025-02-14

## 2025-02-14 RX ORDER — ACETAMINOPHEN 500 MG/5ML
725 LIQUID (ML) ORAL ONCE
Refills: 0 | Status: COMPLETED | OUTPATIENT
Start: 2025-02-14 | End: 2025-02-14

## 2025-02-14 RX ORDER — SODIUM CHLORIDE 0.65 %
1 AEROSOL, SPRAY (ML) NASAL
Qty: 0 | Refills: 0 | DISCHARGE
Start: 2025-02-14

## 2025-02-14 RX ORDER — ALPRAZOLAM 0.5 MG
0.25 TABLET, EXTENDED RELEASE 24 HR ORAL EVERY 8 HOURS
Refills: 0 | Status: DISCONTINUED | OUTPATIENT
Start: 2025-02-14 | End: 2025-02-15

## 2025-02-14 RX ORDER — WHITE PETROLATUM 1 G/G
1 OINTMENT TOPICAL
Qty: 0 | Refills: 0 | DISCHARGE
Start: 2025-02-14

## 2025-02-14 RX ORDER — ALPRAZOLAM 0.5 MG
1 TABLET, EXTENDED RELEASE 24 HR ORAL
Qty: 21 | Refills: 0
Start: 2025-02-14 | End: 2025-02-20

## 2025-02-14 RX ORDER — MELATONIN 5 MG
1 TABLET ORAL
Qty: 30 | Refills: 0
Start: 2025-02-14 | End: 2025-03-15

## 2025-02-14 RX ORDER — GABAPENTIN 400 MG/1
200 CAPSULE ORAL
Refills: 0 | Status: DISCONTINUED | OUTPATIENT
Start: 2025-02-14 | End: 2025-02-15

## 2025-02-14 RX ORDER — BENZONATATE 100 MG
1 CAPSULE ORAL
Qty: 56 | Refills: 0
Start: 2025-02-14 | End: 2025-02-27

## 2025-02-14 RX ORDER — HYDROCORTISONE 10 MG/G
1 CREAM TOPICAL
Qty: 30 | Refills: 0
Start: 2025-02-14

## 2025-02-14 RX ORDER — LANOLIN/MINERAL OIL/PETROLATUM
1 OINTMENT (GRAM) OPHTHALMIC (EYE)
Qty: 15 | Refills: 0
Start: 2025-02-14

## 2025-02-14 RX ORDER — ACETAMINOPHEN 500 MG/5ML
1 LIQUID (ML) ORAL
Qty: 12 | Refills: 0
Start: 2025-02-14 | End: 2025-02-16

## 2025-02-14 RX ORDER — ENOXAPARIN SODIUM 100 MG/ML
40 INJECTION SUBCUTANEOUS
Qty: 30 | Refills: 0
Start: 2025-02-14

## 2025-02-14 RX ORDER — SODIUM CHLORIDE 9 G/1000ML
1000 INJECTION, SOLUTION INTRAVENOUS
Refills: 0 | Status: DISCONTINUED | OUTPATIENT
Start: 2025-02-14 | End: 2025-02-15

## 2025-02-14 RX ADMIN — GABAPENTIN 300 MILLIGRAM(S): 400 CAPSULE ORAL at 09:46

## 2025-02-14 RX ADMIN — Medication 0.25 MILLIGRAM(S): at 12:27

## 2025-02-14 RX ADMIN — HYDROCORTISONE 1 APPLICATION(S): 10 CREAM TOPICAL at 18:24

## 2025-02-14 RX ADMIN — Medication 7.5 MILLIGRAM(S): at 20:45

## 2025-02-14 RX ADMIN — Medication 1 APPLICATION(S): at 12:39

## 2025-02-14 RX ADMIN — Medication 290 MILLIGRAM(S): at 15:40

## 2025-02-14 RX ADMIN — Medication 7.5 MILLIGRAM(S): at 08:59

## 2025-02-14 RX ADMIN — Medication 40 MILLIGRAM(S): at 08:19

## 2025-02-14 RX ADMIN — ENOXAPARIN SODIUM 40 MILLIGRAM(S): 100 INJECTION SUBCUTANEOUS at 12:33

## 2025-02-14 RX ADMIN — Medication 725 MILLIGRAM(S): at 16:40

## 2025-02-14 RX ADMIN — Medication 7.5 MILLIGRAM(S): at 02:54

## 2025-02-14 RX ADMIN — Medication 100 MILLIGRAM(S): at 18:24

## 2025-02-14 RX ADMIN — HYDROCORTISONE 1 APPLICATION(S): 10 CREAM TOPICAL at 05:16

## 2025-02-14 RX ADMIN — Medication 7.5 MILLIGRAM(S): at 09:59

## 2025-02-14 RX ADMIN — SODIUM CHLORIDE 30 MILLILITER(S): 9 INJECTION, SOLUTION INTRAVENOUS at 12:35

## 2025-02-14 RX ADMIN — Medication 290 MILLIGRAM(S): at 05:15

## 2025-02-15 VITALS
HEART RATE: 90 BPM | TEMPERATURE: 97 F | DIASTOLIC BLOOD PRESSURE: 61 MMHG | RESPIRATION RATE: 18 BRPM | OXYGEN SATURATION: 100 % | SYSTOLIC BLOOD PRESSURE: 115 MMHG

## 2025-02-15 LAB
ANION GAP SERPL CALC-SCNC: 10 MMOL/L — SIGNIFICANT CHANGE UP (ref 7–14)
BUN SERPL-MCNC: 10 MG/DL — SIGNIFICANT CHANGE UP (ref 7–23)
CALCIUM SERPL-MCNC: 9 MG/DL — SIGNIFICANT CHANGE UP (ref 8.4–10.5)
CHLORIDE SERPL-SCNC: 102 MMOL/L — SIGNIFICANT CHANGE UP (ref 98–107)
CO2 SERPL-SCNC: 26 MMOL/L — SIGNIFICANT CHANGE UP (ref 22–31)
CREAT SERPL-MCNC: 0.46 MG/DL — LOW (ref 0.5–1.3)
EGFR: 102 ML/MIN/1.73M2 — SIGNIFICANT CHANGE UP
EGFR: 102 ML/MIN/1.73M2 — SIGNIFICANT CHANGE UP
GLUCOSE SERPL-MCNC: 85 MG/DL — SIGNIFICANT CHANGE UP (ref 70–99)
HCT VFR BLD CALC: 28.3 % — LOW (ref 34.5–45)
HGB BLD-MCNC: 8.2 G/DL — LOW (ref 11.5–15.5)
MAGNESIUM SERPL-MCNC: 1.7 MG/DL — SIGNIFICANT CHANGE UP (ref 1.6–2.6)
MCHC RBC-ENTMCNC: 29 G/DL — LOW (ref 32–36)
MCHC RBC-ENTMCNC: 29 PG — SIGNIFICANT CHANGE UP (ref 27–34)
MCV RBC AUTO: 100 FL — SIGNIFICANT CHANGE UP (ref 80–100)
NRBC # BLD AUTO: 0 K/UL — SIGNIFICANT CHANGE UP (ref 0–0)
NRBC # FLD: 0 K/UL — SIGNIFICANT CHANGE UP (ref 0–0)
NRBC BLD AUTO-RTO: 0 /100 WBCS — SIGNIFICANT CHANGE UP (ref 0–0)
PHOSPHATE SERPL-MCNC: 3 MG/DL — SIGNIFICANT CHANGE UP (ref 2.5–4.5)
PLATELET # BLD AUTO: 322 K/UL — SIGNIFICANT CHANGE UP (ref 150–400)
POTASSIUM SERPL-MCNC: 4 MMOL/L — SIGNIFICANT CHANGE UP (ref 3.5–5.3)
POTASSIUM SERPL-SCNC: 4 MMOL/L — SIGNIFICANT CHANGE UP (ref 3.5–5.3)
RBC # BLD: 2.83 M/UL — LOW (ref 3.8–5.2)
RBC # FLD: 17.6 % — HIGH (ref 10.3–14.5)
SODIUM SERPL-SCNC: 138 MMOL/L — SIGNIFICANT CHANGE UP (ref 135–145)
WBC # BLD: 7.09 K/UL — SIGNIFICANT CHANGE UP (ref 3.8–10.5)
WBC # FLD AUTO: 7.09 K/UL — SIGNIFICANT CHANGE UP (ref 3.8–10.5)

## 2025-02-15 RX ORDER — ONDANSETRON HCL/PF 4 MG/2 ML
1 VIAL (ML) INJECTION
Qty: 21 | Refills: 0
Start: 2025-02-15

## 2025-02-15 RX ORDER — SENNA 187 MG
2 TABLET ORAL
Qty: 56 | Refills: 0
Start: 2025-02-15 | End: 2025-02-28

## 2025-02-15 RX ORDER — ACETAMINOPHEN 500 MG/5ML
725 LIQUID (ML) ORAL ONCE
Refills: 0 | Status: COMPLETED | OUTPATIENT
Start: 2025-02-15 | End: 2025-02-15

## 2025-02-15 RX ORDER — ACETAMINOPHEN 500 MG/5ML
20.3 LIQUID (ML) ORAL
Qty: 300 | Refills: 0
Start: 2025-02-15

## 2025-02-15 RX ORDER — ACETAMINOPHEN 500 MG/5ML
20.3 LIQUID (ML) ORAL
Qty: 30 | Refills: 0
Start: 2025-02-15

## 2025-02-15 RX ORDER — HEPARIN SODIUM,PORCINE/NS/PF 20/20 ML
300 SYRINGE (ML) INTRAVENOUS ONCE
Refills: 0 | Status: COMPLETED | OUTPATIENT
Start: 2025-02-15 | End: 2025-02-15

## 2025-02-15 RX ADMIN — Medication 7.5 MILLIGRAM(S): at 03:14

## 2025-02-15 RX ADMIN — Medication 40 MILLIGRAM(S): at 09:17

## 2025-02-15 RX ADMIN — Medication 7.5 MILLIGRAM(S): at 06:25

## 2025-02-15 RX ADMIN — Medication 300 UNIT(S): at 16:03

## 2025-02-15 RX ADMIN — Medication 7.5 MILLIGRAM(S): at 12:33

## 2025-02-15 RX ADMIN — Medication 7.5 MILLIGRAM(S): at 11:33

## 2025-02-15 RX ADMIN — Medication 725 MILLIGRAM(S): at 09:23

## 2025-02-15 RX ADMIN — GABAPENTIN 300 MILLIGRAM(S): 400 CAPSULE ORAL at 10:10

## 2025-02-15 RX ADMIN — Medication 290 MILLIGRAM(S): at 15:32

## 2025-02-15 RX ADMIN — Medication 1 APPLICATION(S): at 13:20

## 2025-02-15 RX ADMIN — Medication 290 MILLIGRAM(S): at 08:23

## 2025-02-15 RX ADMIN — Medication 290 MILLIGRAM(S): at 00:31

## 2025-02-15 RX ADMIN — Medication 0.25 MILLIGRAM(S): at 15:11

## 2025-02-15 RX ADMIN — ENOXAPARIN SODIUM 40 MILLIGRAM(S): 100 INJECTION SUBCUTANEOUS at 13:20

## 2025-02-19 LAB
CULTURE RESULTS: SIGNIFICANT CHANGE UP
SPECIMEN SOURCE: SIGNIFICANT CHANGE UP

## 2025-03-13 RX ORDER — BENZONATATE 100 MG
1 CAPSULE ORAL
Refills: 0 | DISCHARGE

## 2025-03-13 RX ORDER — NALOXONE HYDROCHLORIDE 0.4 MG/ML
1 INJECTION, SOLUTION INTRAMUSCULAR; INTRAVENOUS; SUBCUTANEOUS
Refills: 0 | DISCHARGE

## 2025-03-13 RX ORDER — LIDOCAINE HYDROCHLORIDE 20 MG/ML
1 JELLY TOPICAL
Refills: 0 | DISCHARGE

## 2025-03-13 RX ORDER — SENNA 187 MG
2 TABLET ORAL
Refills: 0 | DISCHARGE

## 2025-03-13 RX ORDER — GABAPENTIN 400 MG/1
2 CAPSULE ORAL
Refills: 0 | DISCHARGE

## 2025-03-13 RX ORDER — ALPRAZOLAM 0.5 MG
1 TABLET, EXTENDED RELEASE 24 HR ORAL
Refills: 0 | DISCHARGE

## 2025-03-13 RX ORDER — ONDANSETRON HCL/PF 4 MG/2 ML
1 VIAL (ML) INJECTION
Refills: 0 | DISCHARGE

## 2025-03-13 RX ORDER — GABAPENTIN 400 MG/1
300 CAPSULE ORAL
Refills: 0 | DISCHARGE

## 2025-03-13 RX ORDER — ENOXAPARIN SODIUM 100 MG/ML
30 INJECTION SUBCUTANEOUS
Refills: 0 | DISCHARGE

## 2025-03-13 RX ORDER — LIDOCAINE HYDROCHLORIDE 20 MG/ML
0 JELLY TOPICAL
Refills: 0 | DISCHARGE

## 2025-03-13 RX ORDER — FENTANYL CITRATE-0.9 % NACL/PF 100MCG/2ML
1 SYRINGE (ML) INTRAVENOUS
Refills: 0 | DISCHARGE

## 2025-05-09 NOTE — ED PROVIDER NOTE - NEURO NEGATIVE STATEMENT, MLM
89 no loss of consciousness, no gait abnormality, no headache, no sensory deficits, and no weakness.